# Patient Record
Sex: MALE | Race: WHITE | NOT HISPANIC OR LATINO
[De-identification: names, ages, dates, MRNs, and addresses within clinical notes are randomized per-mention and may not be internally consistent; named-entity substitution may affect disease eponyms.]

---

## 2018-09-26 ENCOUNTER — APPOINTMENT (OUTPATIENT)
Dept: ENDOCRINOLOGY | Facility: CLINIC | Age: 78
End: 2018-09-26
Payer: MEDICARE

## 2018-09-26 VITALS
HEART RATE: 72 BPM | WEIGHT: 234 LBS | HEIGHT: 75 IN | BODY MASS INDEX: 29.09 KG/M2 | SYSTOLIC BLOOD PRESSURE: 120 MMHG | DIASTOLIC BLOOD PRESSURE: 78 MMHG

## 2018-09-26 PROCEDURE — 99204 OFFICE O/P NEW MOD 45 MIN: CPT | Mod: 25

## 2018-09-26 PROCEDURE — 36415 COLL VENOUS BLD VENIPUNCTURE: CPT

## 2018-10-23 ENCOUNTER — APPOINTMENT (OUTPATIENT)
Dept: PEDIATRIC ALLERGY IMMUNOLOGY | Facility: CLINIC | Age: 78
End: 2018-10-23
Payer: MEDICARE

## 2018-10-23 VITALS
WEIGHT: 234 LBS | OXYGEN SATURATION: 98 % | DIASTOLIC BLOOD PRESSURE: 75 MMHG | BODY MASS INDEX: 29.09 KG/M2 | HEIGHT: 75 IN | SYSTOLIC BLOOD PRESSURE: 109 MMHG | TEMPERATURE: 98 F | HEART RATE: 74 BPM

## 2018-10-23 DIAGNOSIS — Z88.9 ALLERGY STATUS TO UNSPECIFIED DRUGS, MEDICAMENTS AND BIOLOGICAL SUBSTANCES: ICD-10-CM

## 2018-10-23 DIAGNOSIS — R10.9 UNSPECIFIED ABDOMINAL PAIN: ICD-10-CM

## 2018-10-23 DIAGNOSIS — R09.81 NASAL CONGESTION: ICD-10-CM

## 2018-10-23 PROCEDURE — 99204 OFFICE O/P NEW MOD 45 MIN: CPT | Mod: 25

## 2018-10-23 PROCEDURE — 95004 PERQ TESTS W/ALRGNC XTRCS: CPT

## 2018-10-25 LAB
ACTH SER-ACNC: 11 PG/ML
ALBUMIN SERPL ELPH-MCNC: 4.7 G/DL
ALP BLD-CCNC: 56 U/L
ALT SERPL-CCNC: 15 U/L
ANION GAP SERPL CALC-SCNC: 15 MMOL/L
AST SERPL-CCNC: 21 U/L
BASOPHILS # BLD AUTO: 0.02 K/UL
BASOPHILS NFR BLD AUTO: 0.3 %
BILIRUB SERPL-MCNC: 0.5 MG/DL
BUN SERPL-MCNC: 20 MG/DL
CALCIUM SERPL-MCNC: 9.6 MG/DL
CHLORIDE SERPL-SCNC: 103 MMOL/L
CHOLEST SERPL-MCNC: 173 MG/DL
CHOLEST/HDLC SERPL: 3.4 RATIO
CO2 SERPL-SCNC: 24 MMOL/L
CORTIS 24H UR-MCNC: 24 H
CORTIS 24H UR-MRATE: 21 MCG/24 H
CORTIS SERPL-MCNC: 10 UG/DL
CREAT SERPL-MCNC: 0.89 MG/DL
EOSINOPHIL # BLD AUTO: 0.33 K/UL
EOSINOPHIL NFR BLD AUTO: 5.8 %
FSH SERPL-MCNC: 18.7 IU/L
GLUCOSE SERPL-MCNC: 84 MG/DL
HBA1C MFR BLD HPLC: 5.8 %
HCT VFR BLD CALC: 43.4 %
HDLC SERPL-MCNC: 51 MG/DL
HGB BLD-MCNC: 14 G/DL
IMM GRANULOCYTES NFR BLD AUTO: 0.2 %
LDLC SERPL CALC-MCNC: 106 MG/DL
LH SERPL-ACNC: 12.6 IU/L
LYMPHOCYTES # BLD AUTO: 0.71 K/UL
LYMPHOCYTES NFR BLD AUTO: 12.4 %
MAN DIFF?: NORMAL
MCHC RBC-ENTMCNC: 31.3 PG
MCHC RBC-ENTMCNC: 32.3 GM/DL
MCV RBC AUTO: 96.9 FL
MONOCYTES # BLD AUTO: 0.67 K/UL
MONOCYTES NFR BLD AUTO: 11.7 %
NEUTROPHILS # BLD AUTO: 3.99 K/UL
NEUTROPHILS NFR BLD AUTO: 69.6 %
PLATELET # BLD AUTO: 180 K/UL
POTASSIUM SERPL-SCNC: 4.9 MMOL/L
PROT SERPL-MCNC: 7.4 G/DL
RBC # BLD: 4.48 M/UL
RBC # FLD: 14.6 %
SODIUM SERPL-SCNC: 142 MMOL/L
SPECIMEN VOL 24H UR: 1525 ML
T3 SERPL-MCNC: 101 NG/DL
T4 FREE SERPL-MCNC: 1.2 NG/DL
TESTOST BND SERPL-MCNC: 5.3 PG/ML
TESTOST SERPL-MCNC: 517.5 NG/DL
TRIGL SERPL-MCNC: 79 MG/DL
TSH SERPL-ACNC: 2.13 UIU/ML
WBC # FLD AUTO: 5.73 K/UL

## 2018-10-29 DIAGNOSIS — D80.2 SELECTIVE DEFICIENCY OF IMMUNOGLOBULIN A [IGA]: ICD-10-CM

## 2018-11-12 LAB
DEPRECATED KAPPA LC FREE/LAMBDA SER: 1.82 RATIO
GLIADIN IGA SER QL: 5.3 UNITS
GLIADIN IGG SER QL: <5 UNITS
GLIADIN PEPTIDE IGA SER-ACNC: NEGATIVE
GLIADIN PEPTIDE IGG SER-ACNC: NEGATIVE
IGA SER QL IEP: 47 MG/DL
IGA SER QL IEP: 54 MG/DL
IGG SER QL IEP: 1492 MG/DL
IGM SER QL IEP: 14 MG/DL
KAPPA LC CSF-MCNC: 0.91 MG/DL
KAPPA LC SERPL-MCNC: 1.66 MG/DL
TTG IGA SER IA-ACNC: <5 UNITS
TTG IGA SER-ACNC: NEGATIVE
TTG IGG SER IA-ACNC: >50 UNITS
TTG IGG SER IA-ACNC: POSITIVE

## 2018-11-28 ENCOUNTER — APPOINTMENT (OUTPATIENT)
Dept: GASTROENTEROLOGY | Facility: CLINIC | Age: 78
End: 2018-11-28
Payer: MEDICARE

## 2018-11-28 VITALS
BODY MASS INDEX: 28.25 KG/M2 | HEART RATE: 64 BPM | SYSTOLIC BLOOD PRESSURE: 100 MMHG | TEMPERATURE: 98.1 F | DIASTOLIC BLOOD PRESSURE: 70 MMHG | OXYGEN SATURATION: 98 % | WEIGHT: 226 LBS | RESPIRATION RATE: 14 BRPM

## 2018-11-28 PROCEDURE — 36415 COLL VENOUS BLD VENIPUNCTURE: CPT

## 2018-11-28 PROCEDURE — 99204 OFFICE O/P NEW MOD 45 MIN: CPT | Mod: 25

## 2018-12-11 LAB
ARSENIC 24H UR-MRATE: NORMAL
CADMIUM 24H UR-MRATE: NORMAL
LEAD 24H UR-MRATE: NORMAL
MERCURY 24H UR-MRATE: NORMAL

## 2018-12-17 ENCOUNTER — RESULT REVIEW (OUTPATIENT)
Age: 78
End: 2018-12-17

## 2018-12-28 ENCOUNTER — RESULT REVIEW (OUTPATIENT)
Age: 78
End: 2018-12-28

## 2018-12-28 LAB
24R-OH-CALCIDIOL SERPL-MCNC: 52.6 PG/ML
A-TOCOPHEROL VIT E SERPL-MCNC: 11.2 MG/L
ANNOTATION COMMENT IMP: NORMAL
BETA+GAMMA TOCOPHEROL SERPL-MCNC: 1 MG/L
FOLATE SERPL-MCNC: 17 NG/ML
HLA-DQ2: POSITIVE
HLA-DQ8 QL: NEGATIVE
INR PPP: 1.16 RATIO
PT BLD: 13.3 SEC
REF LAB TEST METHOD: NORMAL
VIT A SERPL-MCNC: 29.6 UG/DL
VIT B12 SERPL-MCNC: 1093 PG/ML

## 2019-01-17 ENCOUNTER — APPOINTMENT (OUTPATIENT)
Dept: GASTROENTEROLOGY | Facility: CLINIC | Age: 79
End: 2019-01-17
Payer: MEDICARE

## 2019-01-17 ENCOUNTER — LABORATORY RESULT (OUTPATIENT)
Age: 79
End: 2019-01-17

## 2019-01-17 VITALS
HEART RATE: 62 BPM | RESPIRATION RATE: 14 BRPM | BODY MASS INDEX: 28.25 KG/M2 | OXYGEN SATURATION: 99 % | WEIGHT: 226 LBS | SYSTOLIC BLOOD PRESSURE: 110 MMHG | DIASTOLIC BLOOD PRESSURE: 70 MMHG

## 2019-01-17 PROCEDURE — 99215 OFFICE O/P EST HI 40 MIN: CPT | Mod: 25

## 2019-01-17 PROCEDURE — 36415 COLL VENOUS BLD VENIPUNCTURE: CPT

## 2019-01-18 NOTE — ASSESSMENT
[FreeTextEntry1] : 78M with hx ?hypogonadism and low testosterone, chronic lyme disease on ATB, hx ?heart surgery on ELIQUIS, metoprolol, furosemide presents for evaluation of celiac disease with mild symptoms and +TTG IgG, +genetic testing and mild evidence of malabsorption\par - had prolonged conversation of risks/benefits of repeat EGD. kylie performed EGD but only took 1 biopsy where the guidelines need 6 for celiac evaluation. pt with cardiac comorbidities and need for defibrillator, therefore high risk for anesthesia. agreed that best to just proceed with GFD and f/u markers to see if improvement in TTG, vitamins\par - f/u after all tests result

## 2019-01-18 NOTE — PHYSICAL EXAM
[General Appearance - Alert] : alert [General Appearance - In No Acute Distress] : in no acute distress [Sclera] : the sclera and conjunctiva were normal [Outer Ear] : the ears and nose were normal in appearance [Neck Appearance] : the appearance of the neck was normal [Heart Rate And Rhythm] : heart rate was normal and rhythm regular [Edema] : there was no peripheral edema [Bowel Sounds] : normal bowel sounds [Abdomen Soft] : soft [Abdomen Tenderness] : non-tender [Abdomen Mass (___ Cm)] : no abdominal mass palpated [No CVA Tenderness] : no ~M costovertebral angle tenderness [Abnormal Walk] : normal gait [] : no rash [No Focal Deficits] : no focal deficits [Oriented To Time, Place, And Person] : oriented to person, place, and time

## 2019-01-18 NOTE — HISTORY OF PRESENT ILLNESS
[de-identified] : 78M with hx ?hypogonadism and low testosterone, lyme disase, hx ?heart surgery on ELIQUIS, metoprolol, furosemide presents for evaluation of celiac disease. \par 1/17/19\par - pt feeling well, has been seeing cardiologist who says he has weak heart wants to implant defibrillator\par - trying to eat gluten free with wife but not perfect\par - vitamin A was low but not low enough to be "deficient" PT/INR minimally elevated\par - celiac genetic testing was + for possible celiac\par PREVIOUS HX\par Pt initially presetned to Dr. Andrade for possibvle food allergy/drug allergy evaluation and states he felt fatigued and lightheaded after eating bread, bloating. Hx of autoimmune diseases in family. Does feel better after doing gluten free food trial. Also c/o tingling in his fingers. Pt reports having egd/cscope done with outside GI Dr. Manjarrez ~1yr ago report not available. \par \par

## 2019-01-22 DIAGNOSIS — T56.894A: ICD-10-CM

## 2019-01-29 ENCOUNTER — RESULT REVIEW (OUTPATIENT)
Age: 79
End: 2019-01-29

## 2019-01-29 LAB
CRP SERPL-MCNC: 0.19 MG/DL
DEPRECATED WHEAT IGE RAST QL: 0
GLIADIN IGA SER QL: <5 UNITS
GLIADIN IGG SER QL: <5 UNITS
GLIADIN PEPTIDE IGA SER-ACNC: NEGATIVE
GLIADIN PEPTIDE IGG SER-ACNC: NEGATIVE
TRYPTASE: 3.8 NG/ML
TTG IGA SER IA-ACNC: <5 UNITS
TTG IGA SER-ACNC: NEGATIVE
TTG IGG SER IA-ACNC: >50 UNITS
TTG IGG SER IA-ACNC: POSITIVE
VIT A SERPL-MCNC: 33.4 UG/DL
WHEAT IGE QN: <0.1 KUA/L

## 2019-02-12 ENCOUNTER — RESULT REVIEW (OUTPATIENT)
Age: 79
End: 2019-02-12

## 2019-02-27 ENCOUNTER — NON-APPOINTMENT (OUTPATIENT)
Age: 79
End: 2019-02-27

## 2019-02-27 ENCOUNTER — APPOINTMENT (OUTPATIENT)
Dept: HEART AND VASCULAR | Facility: CLINIC | Age: 79
End: 2019-02-27
Payer: MEDICARE

## 2019-02-27 VITALS
HEART RATE: 43 BPM | BODY MASS INDEX: 28.1 KG/M2 | WEIGHT: 226 LBS | HEIGHT: 75 IN | DIASTOLIC BLOOD PRESSURE: 59 MMHG | SYSTOLIC BLOOD PRESSURE: 114 MMHG

## 2019-02-27 DIAGNOSIS — L65.9 NONSCARRING HAIR LOSS, UNSPECIFIED: ICD-10-CM

## 2019-02-27 DIAGNOSIS — Z87.19 PERSONAL HISTORY OF OTHER DISEASES OF THE DIGESTIVE SYSTEM: ICD-10-CM

## 2019-02-27 PROCEDURE — 93000 ELECTROCARDIOGRAM COMPLETE: CPT

## 2019-02-27 PROCEDURE — 99205 OFFICE O/P NEW HI 60 MIN: CPT

## 2019-02-27 RX ORDER — FUROSEMIDE 80 MG/1
TABLET ORAL
Refills: 0 | Status: DISCONTINUED | COMMUNITY
End: 2019-02-27

## 2019-02-27 RX ORDER — DOXYCYCLINE HYCLATE 100 MG/1
100 CAPSULE ORAL
Qty: 42 | Refills: 0 | Status: DISCONTINUED | COMMUNITY
Start: 2018-11-13 | End: 2019-02-27

## 2019-02-27 RX ORDER — METOPROLOL TARTRATE 75 MG/1
TABLET, FILM COATED ORAL
Refills: 0 | Status: DISCONTINUED | COMMUNITY
End: 2019-02-27

## 2019-02-27 NOTE — REVIEW OF SYSTEMS
[Fever] : no fever [Headache] : no headache [Recent Weight Gain (___ Lbs)] : no recent weight gain [Chills] : no chills [Feeling Fatigued] : feeling fatigued [Recent Weight Loss (___ Lbs)] : no recent weight loss [Shortness Of Breath] : shortness of breath [Dyspnea on exertion] : dyspnea during exertion [Chest  Pressure] : no chest pressure [Chest Pain] : no chest pain [Lower Ext Edema] : no extremity edema [Leg Claudication] : no intermittent leg claudication [Palpitations] : no palpitations [Joint Pain] : joint pain [Joint Swelling] : no joint swelling [Joint Stiffness] : no joint stiffness [Muscle Cramps] : no muscle cramps [Limb Weakness (Paresis)] : no limb weakness [Negative] : Heme/Lymph

## 2019-02-27 NOTE — HISTORY OF PRESENT ILLNESS
[FreeTextEntry1] : 78 y.o. male patient with a h/o mitral and aortic valve surgery ~ 4 years ago with Dr. Davis at [facility].  He has been told that he may need a "device" - possibly CRT.  He also has a h/o Atrial fibrillation that was noted post operatively.  He is "not quite right" - he has poor stamina and gets easily fatigued.  He has been undergoing treatment for chronic fatigue syndrom / lyme disease for this.  He denies any hospitalizations for HF since his surgery.  He believes that his ejection fraction is < 35%.  He has never had syncope, near syncope, palpitation, chest pain, orthopnea, lower extremity edema.\par \par He is here for a second opinion regarding need for device insertion.  He also is interested in whether his stamina can be improved.\par \par There is no cardiology data from his prior evaluation available for review today.

## 2019-02-27 NOTE — PHYSICAL EXAM
[General Appearance - Well Developed] : well developed [Normal Appearance] : normal appearance [Well Groomed] : well groomed [General Appearance - Well Nourished] : well nourished [No Deformities] : no deformities [General Appearance - In No Acute Distress] : no acute distress [Normal Conjunctiva] : the conjunctiva exhibited no abnormalities [Eyelids - No Xanthelasma] : the eyelids demonstrated no xanthelasmas [Normal Oral Mucosa] : normal oral mucosa [No Oral Pallor] : no oral pallor [No Oral Cyanosis] : no oral cyanosis [Normal Jugular Venous A Waves Present] : normal jugular venous A waves present [Normal Jugular Venous V Waves Present] : normal jugular venous V waves present [No Jugular Venous Castillo A Waves] : no jugular venous castillo A waves [Heart Rate And Rhythm] : heart rate and rhythm were normal [Heart Sounds] : normal S1 and S2 [Murmurs] : no murmurs present [Normal] : the heart rate was normal [Irregularly Irregular] : the rhythm was irregularly irregular [FreeTextEntry1] : Well healed sternotomy [Respiration, Rhythm And Depth] : normal respiratory rhythm and effort [Exaggerated Use Of Accessory Muscles For Inspiration] : no accessory muscle use [Auscultation Breath Sounds / Voice Sounds] : lungs were clear to auscultation bilaterally [Abdomen Soft] : soft [Abdomen Tenderness] : non-tender [Abdomen Mass (___ Cm)] : no abdominal mass palpated [Abnormal Walk] : normal gait [Gait - Sufficient For Exercise Testing] : the gait was sufficient for exercise testing [Nail Clubbing] : no clubbing of the fingernails [Cyanosis, Localized] : no localized cyanosis [Petechial Hemorrhages (___cm)] : no petechial hemorrhages [Skin Color & Pigmentation] : normal skin color and pigmentation [] : no rash [No Venous Stasis] : no venous stasis [Skin Lesions] : no skin lesions [No Skin Ulcers] : no skin ulcer [No Xanthoma] : no  xanthoma was observed [Oriented To Time, Place, And Person] : oriented to person, place, and time [Affect] : the affect was normal [Mood] : the mood was normal [No Anxiety] : not feeling anxious

## 2019-03-05 ENCOUNTER — APPOINTMENT (OUTPATIENT)
Dept: HEART AND VASCULAR | Facility: CLINIC | Age: 79
End: 2019-03-05

## 2019-04-03 ENCOUNTER — APPOINTMENT (OUTPATIENT)
Dept: ENDOCRINOLOGY | Facility: CLINIC | Age: 79
End: 2019-04-03
Payer: MEDICARE

## 2019-04-03 VITALS
WEIGHT: 228 LBS | HEIGHT: 75 IN | HEART RATE: 71 BPM | DIASTOLIC BLOOD PRESSURE: 70 MMHG | BODY MASS INDEX: 28.35 KG/M2 | SYSTOLIC BLOOD PRESSURE: 110 MMHG

## 2019-04-03 DIAGNOSIS — R79.89 OTHER SPECIFIED ABNORMAL FINDINGS OF BLOOD CHEMISTRY: ICD-10-CM

## 2019-04-03 DIAGNOSIS — R53.83 OTHER FATIGUE: ICD-10-CM

## 2019-04-03 DIAGNOSIS — R63.5 ABNORMAL WEIGHT GAIN: ICD-10-CM

## 2019-04-03 LAB
GLUCOSE BLDC GLUCOMTR-MCNC: 123
HBA1C MFR BLD HPLC: 6

## 2019-04-03 PROCEDURE — 82962 GLUCOSE BLOOD TEST: CPT

## 2019-04-03 PROCEDURE — 83036 HEMOGLOBIN GLYCOSYLATED A1C: CPT | Mod: QW

## 2019-04-03 PROCEDURE — 99214 OFFICE O/P EST MOD 30 MIN: CPT | Mod: 25

## 2019-04-03 NOTE — REVIEW OF SYSTEMS
[As Noted in HPI] : as noted in HPI [Recent Weight Loss (___ Lbs)] : recent [unfilled] ~Ulb weight loss [Negative] : Heme/Lymph

## 2019-04-10 ENCOUNTER — APPOINTMENT (OUTPATIENT)
Dept: GASTROENTEROLOGY | Facility: CLINIC | Age: 79
End: 2019-04-10

## 2019-04-11 LAB
ALBUMIN SERPL ELPH-MCNC: 4.3 G/DL
ALP BLD-CCNC: 59 U/L
ALT SERPL-CCNC: 15 U/L
ANION GAP SERPL CALC-SCNC: 10 MMOL/L
AST SERPL-CCNC: 14 U/L
BASOPHILS # BLD AUTO: 0.05 K/UL
BASOPHILS NFR BLD AUTO: 1 %
BILIRUB SERPL-MCNC: 0.4 MG/DL
BUN SERPL-MCNC: 22 MG/DL
CALCIUM SERPL-MCNC: 9.3 MG/DL
CHLORIDE SERPL-SCNC: 110 MMOL/L
CHOLEST SERPL-MCNC: 129 MG/DL
CHOLEST/HDLC SERPL: 3.2 RATIO
CO2 SERPL-SCNC: 25 MMOL/L
CREAT SERPL-MCNC: 0.93 MG/DL
EOSINOPHIL # BLD AUTO: 0.31 K/UL
EOSINOPHIL NFR BLD AUTO: 6 %
ESTIMATED AVERAGE GLUCOSE: 123 MG/DL
FSH SERPL-MCNC: 18.5 IU/L
GLUCOSE SERPL-MCNC: 135 MG/DL
HBA1C MFR BLD HPLC: 5.9 %
HCT VFR BLD CALC: 43.1 %
HDLC SERPL-MCNC: 41 MG/DL
HGB BLD-MCNC: 14 G/DL
IMM GRANULOCYTES NFR BLD AUTO: 0.2 %
LDLC SERPL CALC-MCNC: 71 MG/DL
LH SERPL-ACNC: 15.5 IU/L
LYMPHOCYTES # BLD AUTO: 0.76 K/UL
LYMPHOCYTES NFR BLD AUTO: 14.8 %
MAN DIFF?: NORMAL
MCHC RBC-ENTMCNC: 31.3 PG
MCHC RBC-ENTMCNC: 32.5 GM/DL
MCV RBC AUTO: 96.4 FL
MONOCYTES # BLD AUTO: 0.7 K/UL
MONOCYTES NFR BLD AUTO: 13.6 %
NEUTROPHILS # BLD AUTO: 3.32 K/UL
NEUTROPHILS NFR BLD AUTO: 64.4 %
PLATELET # BLD AUTO: 153 K/UL
POTASSIUM SERPL-SCNC: 4.6 MMOL/L
PROLACTIN SERPL-MCNC: 14.2 NG/ML
PROT SERPL-MCNC: 7.1 G/DL
RBC # BLD: 4.47 M/UL
RBC # FLD: 14 %
SODIUM SERPL-SCNC: 145 MMOL/L
T3 SERPL-MCNC: 81 NG/DL
T4 FREE SERPL-MCNC: 1.1 NG/DL
TESTOST BND SERPL-MCNC: 4.9 PG/ML
TESTOST SERPL-MCNC: 338.2 NG/DL
TRIGL SERPL-MCNC: 84 MG/DL
TSH SERPL-ACNC: 2.12 UIU/ML
WBC # FLD AUTO: 5.15 K/UL

## 2019-04-11 NOTE — ASSESSMENT
[FreeTextEntry1] : The patient is doing somewhat better - he has lost weight.\par Would continue current tx.\par Lab. tests today.\par To see urologist.\par Consider testosterone supplementation.\par F/u - 3 months.

## 2019-04-11 NOTE — HISTORY OF PRESENT ILLNESS
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 Recalls receiving testosterone injections about 20 yrs ago for "low testosterone level".\par He also c/o weight gain of about 20 lb in the last 4-5 yrs.\par 4/3/19. The patient is doing somewhat better. He has lost 6 lb.\par Is interested in T replacement at this time.\par HbA1C today is 6%, glucose - 123 mg/dl.

## 2019-04-16 ENCOUNTER — APPOINTMENT (OUTPATIENT)
Dept: HEART AND VASCULAR | Facility: CLINIC | Age: 79
End: 2019-04-16
Payer: MEDICARE

## 2019-04-16 ENCOUNTER — NON-APPOINTMENT (OUTPATIENT)
Age: 79
End: 2019-04-16

## 2019-04-16 VITALS
BODY MASS INDEX: 28.35 KG/M2 | WEIGHT: 228 LBS | DIASTOLIC BLOOD PRESSURE: 68 MMHG | SYSTOLIC BLOOD PRESSURE: 102 MMHG | HEART RATE: 75 BPM | HEIGHT: 75 IN

## 2019-04-16 DIAGNOSIS — Z95.2 PRESENCE OF PROSTHETIC HEART VALVE: ICD-10-CM

## 2019-04-16 DIAGNOSIS — R53.82 CHRONIC FATIGUE, UNSPECIFIED: ICD-10-CM

## 2019-04-16 DIAGNOSIS — Z86.19 PERSONAL HISTORY OF OTHER INFECTIOUS AND PARASITIC DISEASES: ICD-10-CM

## 2019-04-16 PROCEDURE — 93306 TTE W/DOPPLER COMPLETE: CPT

## 2019-04-16 PROCEDURE — 93000 ELECTROCARDIOGRAM COMPLETE: CPT

## 2019-04-16 PROCEDURE — 99205 OFFICE O/P NEW HI 60 MIN: CPT | Mod: 25

## 2019-04-16 PROCEDURE — 36415 COLL VENOUS BLD VENIPUNCTURE: CPT

## 2019-04-16 RX ORDER — FINASTERIDE 1 MG/1
1 TABLET ORAL DAILY
Refills: 0 | Status: DISCONTINUED | COMMUNITY
Start: 2019-02-27 | End: 2019-04-16

## 2019-04-17 LAB — NT-PROBNP SERPL-MCNC: 2135 PG/ML

## 2019-05-02 ENCOUNTER — FORM ENCOUNTER (OUTPATIENT)
Age: 79
End: 2019-05-02

## 2019-05-03 ENCOUNTER — APPOINTMENT (OUTPATIENT)
Dept: MRI IMAGING | Facility: HOSPITAL | Age: 79
End: 2019-05-03
Payer: MEDICARE

## 2019-05-03 ENCOUNTER — OUTPATIENT (OUTPATIENT)
Dept: OUTPATIENT SERVICES | Facility: HOSPITAL | Age: 79
LOS: 1 days | End: 2019-05-03
Payer: MEDICARE

## 2019-05-03 PROCEDURE — 71555 MRI ANGIO CHEST W OR W/O DYE: CPT

## 2019-05-03 PROCEDURE — 71555 MRI ANGIO CHEST W OR W/O DYE: CPT | Mod: 26

## 2019-05-09 ENCOUNTER — RX RENEWAL (OUTPATIENT)
Age: 79
End: 2019-05-09

## 2019-05-09 ENCOUNTER — APPOINTMENT (OUTPATIENT)
Dept: HEART AND VASCULAR | Facility: CLINIC | Age: 79
End: 2019-05-09
Payer: MEDICARE

## 2019-05-09 ENCOUNTER — NON-APPOINTMENT (OUTPATIENT)
Age: 79
End: 2019-05-09

## 2019-05-09 VITALS
WEIGHT: 233 LBS | BODY MASS INDEX: 28.97 KG/M2 | HEIGHT: 75 IN | HEART RATE: 77 BPM | SYSTOLIC BLOOD PRESSURE: 90 MMHG | DIASTOLIC BLOOD PRESSURE: 60 MMHG

## 2019-05-09 VITALS — DIASTOLIC BLOOD PRESSURE: 60 MMHG | SYSTOLIC BLOOD PRESSURE: 94 MMHG

## 2019-05-09 DIAGNOSIS — I71.2 THORACIC AORTIC ANEURYSM, W/OUT RUPTURE: ICD-10-CM

## 2019-05-09 PROCEDURE — 93000 ELECTROCARDIOGRAM COMPLETE: CPT

## 2019-05-09 PROCEDURE — 99214 OFFICE O/P EST MOD 30 MIN: CPT | Mod: 25

## 2019-05-09 NOTE — PHYSICAL EXAM
[General Appearance - Well Developed] : well developed [Normal Appearance] : normal appearance [Well Groomed] : well groomed [General Appearance - Well Nourished] : well nourished [General Appearance - In No Acute Distress] : no acute distress [No Deformities] : no deformities [Normal Conjunctiva] : the conjunctiva exhibited no abnormalities [Normal Oral Mucosa] : normal oral mucosa [] : no respiratory distress [Respiration, Rhythm And Depth] : normal respiratory rhythm and effort [Exaggerated Use Of Accessory Muscles For Inspiration] : no accessory muscle use [Auscultation Breath Sounds / Voice Sounds] : lungs were clear to auscultation bilaterally [Heart Rate And Rhythm] : heart rate and rhythm were normal [Heart Sounds] : normal S1 and S2 [Murmurs] : no murmurs present [Bowel Sounds] : normal bowel sounds [Edema] : no peripheral edema present [Abdomen Soft] : soft [Abdomen Tenderness] : non-tender [Nail Clubbing] : no clubbing of the fingernails [Abnormal Walk] : normal gait [Skin Color & Pigmentation] : normal skin color and pigmentation [Oriented To Time, Place, And Person] : oriented to person, place, and time [FreeTextEntry1] : mild + JVD

## 2019-05-09 NOTE — HISTORY OF PRESENT ILLNESS
[FreeTextEntry1] : tolerating Entresto- has occasional unsteadiness but not worse since increase in Entresto- periodic sob

## 2019-05-09 NOTE — DISCUSSION/SUMMARY
[FreeTextEntry1] : EKG:NSR,APC,VPC\par reviewed  MRA results and i will have them see Dr Jose for aortic aneurysm\par cont Entresto + Toprol for CM, discussed need for ICD and they will see EP\par cont eliquis for pAF\par will not increase BB due to Hypotension

## 2019-05-15 ENCOUNTER — APPOINTMENT (OUTPATIENT)
Dept: CARDIOTHORACIC SURGERY | Facility: CLINIC | Age: 79
End: 2019-05-15
Payer: MEDICARE

## 2019-05-15 PROCEDURE — 99205 OFFICE O/P NEW HI 60 MIN: CPT

## 2019-05-16 VITALS
SYSTOLIC BLOOD PRESSURE: 119 MMHG | HEART RATE: 74 BPM | DIASTOLIC BLOOD PRESSURE: 68 MMHG | RESPIRATION RATE: 16 BRPM | BODY MASS INDEX: 27.98 KG/M2 | HEIGHT: 75 IN | OXYGEN SATURATION: 96 % | WEIGHT: 225 LBS | TEMPERATURE: 98.1 F

## 2019-05-16 RX ORDER — MINOXIDIL 5 %
5 SOLUTION, NON-ORAL TOPICAL
Refills: 0 | Status: COMPLETED | COMMUNITY
End: 2019-05-16

## 2019-05-16 NOTE — PHYSICAL EXAM
[General Appearance - Alert] : alert [General Appearance - In No Acute Distress] : in no acute distress [Outer Ear] : the ears and nose were normal in appearance [Sclera] : the sclera and conjunctiva were normal [] : no respiratory distress [Apical Impulse] : the apical impulse was normal [Examination Of The Chest] : the chest was normal in appearance [2+] : left 2+ [Abdomen Soft] : soft [No CVA Tenderness] : no ~M costovertebral angle tenderness [Abnormal Walk] : normal gait [Deep Tendon Reflexes (DTR)] : deep tendon reflexes were 2+ and symmetric [Oriented To Time, Place, And Person] : oriented to person, place, and time

## 2019-05-17 ENCOUNTER — APPOINTMENT (OUTPATIENT)
Dept: HEART AND VASCULAR | Facility: CLINIC | Age: 79
End: 2019-05-17
Payer: MEDICARE

## 2019-05-17 ENCOUNTER — NON-APPOINTMENT (OUTPATIENT)
Age: 79
End: 2019-05-17

## 2019-05-17 VITALS — SYSTOLIC BLOOD PRESSURE: 105 MMHG | DIASTOLIC BLOOD PRESSURE: 73 MMHG | HEART RATE: 72 BPM

## 2019-05-17 PROCEDURE — 99214 OFFICE O/P EST MOD 30 MIN: CPT | Mod: 25

## 2019-05-17 PROCEDURE — 93000 ELECTROCARDIOGRAM COMPLETE: CPT

## 2019-06-04 NOTE — HISTORY OF PRESENT ILLNESS
[FreeTextEntry1] : 78 year old male with history of Lyme Disease (being followed by Dr. López, treated with antibiotics, off for a year now), MVR/AVR with Dr. Davis at [facility] [place] [facility] 12/2014, cardiomyopathy with reduced EF (20-25%), A fib (on Eliquis, being followed by Dr. Ortiz for ICD/CRT), ROHAN on CPAP, fatigue, presents today for an initial evaluation of an aortic aneurysm. He was referred by Dr. Bowen Zuñiga. \par \par Echo 4/16/19: Aortic root 4.1cm, ascending aorta 4.8cm, bioprosthetic mitral valve replacement with minimal MR, bioprosthetic aortic valve replacement with no AR, EF 20-25%, severe reversible diastolic dysfunction (stage III).\par \par MRA chest 5/3/19: aortic root 4.4cm, ascending aorta 5cm.  \par \par Patient endorses NYHA class II symptoms. His chief complaint is  mild shortness of breath with activity.  He states he finds it difficult to climb uphill due to his breathing. He has to stop after walking 1 city block to catch his breath. He also states it is difficult to catch a deep breath in. Patient denies any fever, chills, fatigue, syncope, acute chest pain, palpitations, orthopnea, paroxysmal nocturnal dyspnea, vomiting, abdominal pain, back pain, BRBPR or swelling to legs.\par \par Patient is an actor. He remains an active lifestyle by going to the gym every other day (bicycle or treadmill).\par \par

## 2019-06-04 NOTE — CONSULT LETTER
[Dear  ___] : Dear  [unfilled], [Sincerely,] : Sincerely, [Please see my note below.] : Please see my note below. [FreeTextEntry2] : Dr. Bowen Zuñiga\par [address]\[place] [address] [FreeTextEntry1] : I had the pleasure of seeing your patient, DALTON FLOWER, in my office today. We take a multidisciplinary team approach to patient care and consider you, the referring physician, an extension of our team. We will maintain an open line of communication with you throughout your patient's treatment course.  \par \par He is being evaluated for an aortic aneurysm. I have reviewed all of the medical records and diagnostic images at the time of his office visit. I have enclosed a copy for your records. \par \par I have reviewed the indications for surgery,and used our webpage [email or web address] <[email or web address]> to illustrate the aorta and anatomy of the heart. The patient does not meet size criteria for surgical intervention at the time. Therefore, I have recommended that the patient will require a follow up appointment in 1 year with a CTA chest to monitor aortic pathology. My office will assist the patient with his upcoming appointment and I will update you on his progress at that time.\par \par I have discussed with the patient that we will continue to monitor his aortic pathology closely at the [facility] at [facility] that encompasses the entire health care system and is one of the largest in the nation at this point.\par \par It is our commitment to provide your patient with the highest quality of advanced therapeutic options. On behalf of the Cardiothoracic Surgery Team, thank you for sending your patient to the [facility] based at [facility].  If there are any questions or concerns, please call me directly at [phone].  \par \par \par  [FreeTextEntry3] : \par Delon Jose M.D.\par Professor of Cardiovascular and Thoracic Surgery\par Minimally Invasive Valve Surgeon\par Director of Aortic Surgery, [facility]\par Cell: [phone]\par Email: [email or web address] \par \par [facility]:\par [address] Floor, [place] [address]\par Office: [phone]\par Fax: [phone]\par \par [facility]:\par Department of Cardiovascular and Thoracic Surgery\par [address] [place] [address], [address]\par Office: [phone]\par Fax: [phone]\par \par Practice Manager: Ms. Janelle Shahid\par Email: [email or web address]\par Phone: [phone]\par \par \par

## 2019-06-04 NOTE — ASSESSMENT
[FreeTextEntry1] : 78 year old male with history of Lyme Disease (being followed by Dr. López), MVR/AVR with Dr. Davis at [facility], cardiomyopathy with reduced EF (20-25%), A fib (on Eliquis, being followed by Dr. Ortiz for ICD/CRT), ROHAN on CPAP, fatigue, presents today for an initial evaluation of an aortic aneurysm.\par \par The patient's medical records and diagnostic images were reviewed at the time of this office visit, and the following recommendation was made. Patient was deemed high risk for surgical intervention due to his age and prior medical/surgical history. It was decided to closely monitor his aortic pathology for growth. Previous imaging will also be procured to analyze rate of aortic dimension growth. Patient verbalized understanding and agrees to plan discussed. \par Plan:\par 1. Continue medication regimen\par 2. Follow up with PCP and cardiologist\par 3. BP control- I have recommended the patient to monitor his blood pressure closely. I have also advised the patient to take daily blood pressures at home and adhere to medication regimen.\par 4. Return to the [facility] in 1 year with a an MRA chest \par \par

## 2019-06-04 NOTE — PROCEDURE
[FreeTextEntry1] : \par Dr. Jose discussed activity restrictions with the patient, and would advise exercise at a moderate amount with no heavy lifting over one third of body weight, and avoiding heart rates that exceed 140 beats per minute. Every patient must abstain from tobacco and illicit drug use, especially stimulants such as cocaine or methamphetamine. The patient was also counseled on maintaining a healthy heart diet, and losing any excessive weight as this also put undue stress on both the aorta and entire cardiovascular system. He was counseled on the importance of medication adherence for blood pressure control, as hypertension is a significant risk factor associated with aortic aneurysms. First degree family members should be screened for bicuspid valve disease, and ascending aortic aneurysms.\par \par Dr. Jose reviewed the indications for surgery, and used our webpage [email or web address] to illustrate the aorta and anatomy of the heart. Those indications are the following: size greater than 5.0 cm, symptomatic aneurysms, family history of aortic dissection or aneurysm death with a size greater than 4.5 cm, other necessary cardiac procedures such as coronary artery bypass grafting or severe valvular disorders with an aneurysm greater than 4.5 cm, or connective tissue disorders with an aneurysm size greater than 4.5 cm. The patient does not meet size criteria for surgical intervention at the time.\par

## 2019-06-24 ENCOUNTER — NON-APPOINTMENT (OUTPATIENT)
Age: 79
End: 2019-06-24

## 2019-06-24 ENCOUNTER — APPOINTMENT (OUTPATIENT)
Dept: HEART AND VASCULAR | Facility: CLINIC | Age: 79
End: 2019-06-24
Payer: MEDICARE

## 2019-06-24 VITALS
SYSTOLIC BLOOD PRESSURE: 110 MMHG | WEIGHT: 221 LBS | BODY MASS INDEX: 27.48 KG/M2 | DIASTOLIC BLOOD PRESSURE: 64 MMHG | HEIGHT: 75 IN | HEART RATE: 72 BPM

## 2019-06-24 PROCEDURE — 93000 ELECTROCARDIOGRAM COMPLETE: CPT

## 2019-06-24 PROCEDURE — 99214 OFFICE O/P EST MOD 30 MIN: CPT | Mod: 25

## 2019-06-24 RX ORDER — TESTOSTERONE 16.2 MG/G
20.25 MG/ACT GEL TRANSDERMAL
Qty: 2 | Refills: 0 | Status: DISCONTINUED | COMMUNITY
Start: 2019-05-09 | End: 2019-06-24

## 2019-06-24 NOTE — PHYSICAL EXAM
[Normal Appearance] : normal appearance [General Appearance - Well Developed] : well developed [General Appearance - Well Nourished] : well nourished [Well Groomed] : well groomed [No Deformities] : no deformities [Normal Conjunctiva] : the conjunctiva exhibited no abnormalities [General Appearance - In No Acute Distress] : no acute distress [Normal Oral Mucosa] : normal oral mucosa [Respiration, Rhythm And Depth] : normal respiratory rhythm and effort [FreeTextEntry1] : mild + JVD [] : no respiratory distress [Auscultation Breath Sounds / Voice Sounds] : lungs were clear to auscultation bilaterally [Exaggerated Use Of Accessory Muscles For Inspiration] : no accessory muscle use [Heart Rate And Rhythm] : heart rate and rhythm were normal [Murmurs] : no murmurs present [Heart Sounds] : normal S1 and S2 [Abdomen Soft] : soft [Edema] : no peripheral edema present [Bowel Sounds] : normal bowel sounds [Abnormal Walk] : normal gait [Abdomen Tenderness] : non-tender [Skin Color & Pigmentation] : normal skin color and pigmentation [Oriented To Time, Place, And Person] : oriented to person, place, and time [Nail Clubbing] : no clubbing of the fingernails

## 2019-06-24 NOTE — REVIEW OF SYSTEMS
[Recent Weight Gain (___ Lbs)] : no recent weight gain [Fever] : no fever [Headache] : no headache [Chills] : no chills [Feeling Fatigued] : feeling fatigued [see HPI] : see HPI [Loss Of Hearing] : hearing loss [Recent Weight Loss (___ Lbs)] : recent [unfilled] ~Ulb weight loss [Cough] : cough [Wheezing] : wheezing [Coughing Up Blood] : no hemoptysis [Nocturia] : nocturia [Hematuria] : no hematuria [Joint Pain] : joint pain [Knee Problem] : knee problems [Tremor] : no tremor was seen [Dizziness] : dizziness [Tingling (Paresthesia)] : no tingling [Convulsions] : no convulsions [Numbness (Hypesthesia)] : no numbness [Depression] : no depression [Confusion] : no confusion was observed [Memory Lapses Or Loss] : memory lapses or loss [Anxiety] : no anxiety [Under Stress] : under stress [Easy Bruising] : a tendency for easy bruising [Easy Bleeding] : no tendency for easy bleeding [Suicidal] : not suicidal [Negative] : Endocrine

## 2019-06-24 NOTE — DISCUSSION/SUMMARY
[FreeTextEntry1] : EKG:NSR,PVC\par feel CHF would add Lasix, daily weights\par RV 1 week \par cont toprol( will increase next week)\par and entresto for CHF\par cont Eliquis for PAF\par f/u with dr rosales for aorta

## 2019-06-24 NOTE — HISTORY OF PRESENT ILLNESS
[FreeTextEntry1] : has postponed ICD placement due to " bronchitis" has it past month with coughing- saw pulmonologist who DX it. Using CPAP, no edema, cp

## 2019-07-01 ENCOUNTER — APPOINTMENT (OUTPATIENT)
Dept: HEART AND VASCULAR | Facility: CLINIC | Age: 79
End: 2019-07-01
Payer: MEDICARE

## 2019-07-01 VITALS — WEIGHT: 219 LBS | OXYGEN SATURATION: 99 % | BODY MASS INDEX: 27.37 KG/M2 | HEART RATE: 76 BPM

## 2019-07-01 VITALS — SYSTOLIC BLOOD PRESSURE: 110 MMHG | DIASTOLIC BLOOD PRESSURE: 70 MMHG

## 2019-07-01 PROCEDURE — 99213 OFFICE O/P EST LOW 20 MIN: CPT | Mod: 25

## 2019-07-01 PROCEDURE — 36415 COLL VENOUS BLD VENIPUNCTURE: CPT

## 2019-07-01 RX ORDER — TRIAMCINOLONE ACETONIDE 1 MG/G
0.1 CREAM TOPICAL
Qty: 454 | Refills: 0 | Status: DISCONTINUED | COMMUNITY
Start: 2018-03-08

## 2019-07-01 RX ORDER — OMEPRAZOLE 40 MG/1
40 CAPSULE, DELAYED RELEASE ORAL
Qty: 30 | Refills: 0 | Status: DISCONTINUED | COMMUNITY
Start: 2019-02-01

## 2019-07-01 NOTE — DISCUSSION/SUMMARY
[FreeTextEntry1] : check labs\par will increase toprol and cont lasix + entresto for CHF/ICM;Eliquis for af\par needs ICD for CM

## 2019-07-01 NOTE — PHYSICAL EXAM
[General Appearance - Well Developed] : well developed [Normal Appearance] : normal appearance [Well Groomed] : well groomed [General Appearance - Well Nourished] : well nourished [No Deformities] : no deformities [General Appearance - In No Acute Distress] : no acute distress [Normal Conjunctiva] : the conjunctiva exhibited no abnormalities [FreeTextEntry1] : mild + JVD [] : no respiratory distress [Respiration, Rhythm And Depth] : normal respiratory rhythm and effort [Exaggerated Use Of Accessory Muscles For Inspiration] : no accessory muscle use [Auscultation Breath Sounds / Voice Sounds] : lungs were clear to auscultation bilaterally [Heart Rate And Rhythm] : heart rate and rhythm were normal [Heart Sounds] : normal S1 and S2 [Murmurs] : no murmurs present [Edema] : no peripheral edema present [Abdomen Soft] : soft [Abdomen Tenderness] : non-tender [Abnormal Walk] : normal gait [Nail Clubbing] : no clubbing of the fingernails [Oriented To Time, Place, And Person] : oriented to person, place, and time

## 2019-07-02 ENCOUNTER — CLINICAL ADVICE (OUTPATIENT)
Age: 79
End: 2019-07-02

## 2019-07-02 LAB
ANION GAP SERPL CALC-SCNC: 13 MMOL/L
BUN SERPL-MCNC: 26 MG/DL
CALCIUM SERPL-MCNC: 9.6 MG/DL
CHLORIDE SERPL-SCNC: 104 MMOL/L
CO2 SERPL-SCNC: 26 MMOL/L
CREAT SERPL-MCNC: 1.07 MG/DL
GLUCOSE SERPL-MCNC: 95 MG/DL
NT-PROBNP SERPL-MCNC: 3474 PG/ML
POTASSIUM SERPL-SCNC: 4.7 MMOL/L
SODIUM SERPL-SCNC: 143 MMOL/L

## 2019-07-08 ENCOUNTER — NON-APPOINTMENT (OUTPATIENT)
Age: 79
End: 2019-07-08

## 2019-07-08 ENCOUNTER — APPOINTMENT (OUTPATIENT)
Dept: HEART AND VASCULAR | Facility: CLINIC | Age: 79
End: 2019-07-08
Payer: MEDICARE

## 2019-07-08 VITALS
BODY MASS INDEX: 27.23 KG/M2 | SYSTOLIC BLOOD PRESSURE: 118 MMHG | DIASTOLIC BLOOD PRESSURE: 76 MMHG | HEIGHT: 75 IN | WEIGHT: 219 LBS | HEART RATE: 61 BPM | OXYGEN SATURATION: 97 %

## 2019-07-08 PROCEDURE — 36415 COLL VENOUS BLD VENIPUNCTURE: CPT

## 2019-07-08 PROCEDURE — 99213 OFFICE O/P EST LOW 20 MIN: CPT | Mod: 25

## 2019-07-08 PROCEDURE — 93000 ELECTROCARDIOGRAM COMPLETE: CPT

## 2019-07-08 NOTE — PHYSICAL EXAM
[General Appearance - Well Developed] : well developed [Normal Appearance] : normal appearance [Well Groomed] : well groomed [General Appearance - In No Acute Distress] : no acute distress [No Deformities] : no deformities [General Appearance - Well Nourished] : well nourished [Normal Conjunctiva] : the conjunctiva exhibited no abnormalities [Respiration, Rhythm And Depth] : normal respiratory rhythm and effort [Exaggerated Use Of Accessory Muscles For Inspiration] : no accessory muscle use [Auscultation Breath Sounds / Voice Sounds] : lungs were clear to auscultation bilaterally [] : no respiratory distress [Heart Sounds] : normal S1 and S2 [Heart Rate And Rhythm] : heart rate and rhythm were normal [Edema] : no peripheral edema present [Abdomen Soft] : soft [Murmurs] : no murmurs present [Nail Clubbing] : no clubbing of the fingernails [Abnormal Walk] : normal gait [Abdomen Tenderness] : non-tender [Oriented To Time, Place, And Person] : oriented to person, place, and time [Skin Color & Pigmentation] : normal skin color and pigmentation [FreeTextEntry1] : mild + JVD

## 2019-07-08 NOTE — DISCUSSION/SUMMARY
[FreeTextEntry1] : EKG:NSR,PVC\par check labs\par cont toprol+ entresto+ lasix for CHF/HPTN/thoracic aorta. eliquis for PAF

## 2019-07-09 LAB
ANION GAP SERPL CALC-SCNC: 13 MMOL/L
BUN SERPL-MCNC: 27 MG/DL
CALCIUM SERPL-MCNC: 9.3 MG/DL
CHLORIDE SERPL-SCNC: 105 MMOL/L
CO2 SERPL-SCNC: 24 MMOL/L
CREAT SERPL-MCNC: 1.12 MG/DL
GLUCOSE SERPL-MCNC: 101 MG/DL
NT-PROBNP SERPL-MCNC: 2792 PG/ML
POTASSIUM SERPL-SCNC: 4.5 MMOL/L
SODIUM SERPL-SCNC: 142 MMOL/L

## 2019-07-12 NOTE — PHYSICAL EXAM
[Normal Appearance] : normal appearance [General Appearance - Well Developed] : well developed [Well Groomed] : well groomed [General Appearance - Well Nourished] : well nourished [General Appearance - In No Acute Distress] : no acute distress [Normal Conjunctiva] : the conjunctiva exhibited no abnormalities [No Deformities] : no deformities [Eyelids - No Xanthelasma] : the eyelids demonstrated no xanthelasmas [Normal Oral Mucosa] : normal oral mucosa [No Oral Pallor] : no oral pallor [No Oral Cyanosis] : no oral cyanosis [Normal Jugular Venous A Waves Present] : normal jugular venous A waves present [Normal Jugular Venous V Waves Present] : normal jugular venous V waves present [No Jugular Venous Castillo A Waves] : no jugular venous castillo A waves [Respiration, Rhythm And Depth] : normal respiratory rhythm and effort [Exaggerated Use Of Accessory Muscles For Inspiration] : no accessory muscle use [Auscultation Breath Sounds / Voice Sounds] : lungs were clear to auscultation bilaterally [Heart Rate And Rhythm] : heart rate and rhythm were normal [Heart Sounds] : normal S1 and S2 [Normal] : the heart rate was normal [Murmurs] : no murmurs present [Irregularly Irregular] : the rhythm was irregularly irregular [Abdomen Soft] : soft [Abdomen Tenderness] : non-tender [Abdomen Mass (___ Cm)] : no abdominal mass palpated [Abnormal Walk] : normal gait [Gait - Sufficient For Exercise Testing] : the gait was sufficient for exercise testing [Nail Clubbing] : no clubbing of the fingernails [Cyanosis, Localized] : no localized cyanosis [Petechial Hemorrhages (___cm)] : no petechial hemorrhages [Skin Color & Pigmentation] : normal skin color and pigmentation [] : no rash [No Venous Stasis] : no venous stasis [Skin Lesions] : no skin lesions [No Skin Ulcers] : no skin ulcer [No Xanthoma] : no  xanthoma was observed [Oriented To Time, Place, And Person] : oriented to person, place, and time [Affect] : the affect was normal [No Anxiety] : not feeling anxious [Mood] : the mood was normal [FreeTextEntry1] : Well healed sternotomy

## 2019-07-12 NOTE — REVIEW OF SYSTEMS
[Feeling Fatigued] : feeling fatigued [Shortness Of Breath] : shortness of breath [Dyspnea on exertion] : dyspnea during exertion [Joint Pain] : joint pain [Negative] : Heme/Lymph [Fever] : no fever [Headache] : no headache [Recent Weight Gain (___ Lbs)] : no recent weight gain [Recent Weight Loss (___ Lbs)] : no recent weight loss [Chills] : no chills [Chest  Pressure] : no chest pressure [Chest Pain] : no chest pain [Lower Ext Edema] : no extremity edema [Leg Claudication] : no intermittent leg claudication [Joint Swelling] : no joint swelling [Palpitations] : no palpitations [Joint Stiffness] : no joint stiffness [Muscle Cramps] : no muscle cramps [Limb Weakness (Paresis)] : no limb weakness

## 2019-07-12 NOTE — HISTORY OF PRESENT ILLNESS
[FreeTextEntry1] : 78 y.o. male patient with a h/o mitral and aortic valve surgery ~ 4 years ago with Dr. Davis at [facility].  He has been told that he may need a "device" - possibly CRT.  He also has a h/o Atrial fibrillation that was noted post operatively.  He is "not quite right" - he has poor stamina and gets easily fatigued.  He has been undergoing treatment for chronic fatigue syndrom / lyme disease for this. He has never had syncope, near syncope, palpitation, chest pain, orthopnea, lower extremity edema.  He presents for f/u discussion of ICD placement.  Entresto has been uptitrated by Dr. Zuñiga.  Tolerating other medications, including Toprol and Eliquis without issues.\par \par ECHO 4/2019 EF 20-25%, global LV hypokinesis, mild conc LVH, severe reversible diastolic dysfunciton (st III), mild-mod TR

## 2019-07-19 ENCOUNTER — APPOINTMENT (OUTPATIENT)
Dept: PULMONOLOGY | Facility: CLINIC | Age: 79
End: 2019-07-19
Payer: MEDICARE

## 2019-07-19 VITALS
BODY MASS INDEX: 27.23 KG/M2 | OXYGEN SATURATION: 97 % | WEIGHT: 219 LBS | HEART RATE: 42 BPM | SYSTOLIC BLOOD PRESSURE: 90 MMHG | DIASTOLIC BLOOD PRESSURE: 60 MMHG | HEIGHT: 75 IN | TEMPERATURE: 97.6 F

## 2019-07-19 DIAGNOSIS — Z99.89 OBSTRUCTIVE SLEEP APNEA (ADULT) (PEDIATRIC): ICD-10-CM

## 2019-07-19 DIAGNOSIS — G47.33 OBSTRUCTIVE SLEEP APNEA (ADULT) (PEDIATRIC): ICD-10-CM

## 2019-07-19 PROCEDURE — 99204 OFFICE O/P NEW MOD 45 MIN: CPT | Mod: 25

## 2019-07-19 PROCEDURE — 94727 GAS DIL/WSHOT DETER LNG VOL: CPT

## 2019-07-19 PROCEDURE — 94010 BREATHING CAPACITY TEST: CPT

## 2019-07-19 PROCEDURE — 94729 DIFFUSING CAPACITY: CPT

## 2019-07-19 NOTE — PHYSICAL EXAM
[General Appearance - Well Developed] : well developed [Normal Appearance] : normal appearance [Well Groomed] : well groomed [General Appearance - Well Nourished] : well nourished [No Deformities] : no deformities [General Appearance - In No Acute Distress] : no acute distress [Normal Conjunctiva] : the conjunctiva exhibited no abnormalities [Eyelids - No Xanthelasma] : the eyelids demonstrated no xanthelasmas [Normal Oropharynx] : normal oropharynx [Neck Appearance] : the appearance of the neck was normal [Neck Cervical Mass (___cm)] : no neck mass was observed [Jugular Venous Distention Increased] : there was no jugular-venous distention [Thyroid Diffuse Enlargement] : the thyroid was not enlarged [Thyroid Nodule] : there were no palpable thyroid nodules [Heart Rate And Rhythm] : heart rate and rhythm were normal [Heart Sounds] : normal S1 and S2 [Murmurs] : no murmurs present [Respiration, Rhythm And Depth] : normal respiratory rhythm and effort [Exaggerated Use Of Accessory Muscles For Inspiration] : no accessory muscle use [Auscultation Breath Sounds / Voice Sounds] : lungs were clear to auscultation bilaterally [Nail Clubbing] : no clubbing of the fingernails [Cyanosis, Localized] : no localized cyanosis [Petechial Hemorrhages (___cm)] : no petechial hemorrhages [] : no ischemic changes

## 2019-07-19 NOTE — HISTORY OF PRESENT ILLNESS
[Stable] : are stable [Difficulty Breathing During Exertion] : worsened dyspnea on exertion [Feelings Of Weakness On Exertion] : worsened exercise intolerance [Cough] : denies coughing [Wheezing] : denies wheezing [Regional Soft Tissue Swelling Both Lower Extremities] : denies lower extremity edema [Chest Pain Or Discomfort] : denies chest pain [FreeTextEntry1] : 78 yr old male with CHF, AAA, AFib, ROHAN on CPAP, s/p Aortic valve replacement and mitral valve replacement, non-smoker but second hand smoke exposure, nasal polyps s/p polypectomy.  Following with cardiology Dr. Zuñiga.  He is following his blood pressure at home.  He is currently on cefidnir for lyme disease.  \par \par His heart rate and BP is low and Dr. Zuñiga is taking off some of the BP medications.  He is off metoprolol.  He is currently taking 20 mg BID of laix and entresto.\par \par He is followed by Dr. Krishna who is putting him on defibrillator 8/7/2019.  \par \par Presents today to consultation for SOB and dizziness. SOB often has when walking often has to stop 2 times in one block.  He has SOB with walking up the stairs.  He denies wheezing, cough, fever.   He had an episode of bronchitis in May for 6 weeks and took AntibiOtic.  He worked as a  in [employer] bars and .  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 He does have night sweats.   \par \par He is wearing CPAP at night with full face mask 5 days out of 7 days a week. No snoring through the mask.  He fall

## 2019-07-19 NOTE — ASSESSMENT
[FreeTextEntry1] : SOB\par \par Discussed with pt could be multifocal due to afib, CHF, ROHAN, Pulmonary HTN. Pt BP is low today 90/60 and HR in the 40s.  Discussed CXR and PFT normal. Unlikely his SOB is related to lung condition most likely cardiac in origin.  He is to follow with Dr. Zuñiga, I have a call into Dr. Zuñiga to discuss the case.\par \par CXR reviewed from 7/16/2019 CD ROM \par \par - Clear lung without any pneumothorax or infiltrates.\par \par Echo 4/16/2010 - PSAP 60 mmhg,  ER 20-25% \par \par PFT 7/16/19 normal madhav, TLC and DLCO. \par \par History of second hand smoke exposure from parents and smoking in bar.\par \par ROHAN - pt is compliant with CPAP 5 out of 7 days a week.  Instructed to use nightly.

## 2019-07-23 ENCOUNTER — APPOINTMENT (OUTPATIENT)
Dept: ENDOCRINOLOGY | Facility: CLINIC | Age: 79
End: 2019-07-23

## 2019-07-30 ENCOUNTER — INPATIENT (INPATIENT)
Facility: HOSPITAL | Age: 79
LOS: 2 days | Discharge: HOME | DRG: 308 | End: 2019-08-02
Attending: INTERNAL MEDICINE | Admitting: INTERNAL MEDICINE
Payer: MEDICARE

## 2019-07-30 ENCOUNTER — NON-APPOINTMENT (OUTPATIENT)
Age: 79
End: 2019-07-30

## 2019-07-30 ENCOUNTER — APPOINTMENT (OUTPATIENT)
Dept: HEART AND VASCULAR | Facility: CLINIC | Age: 79
End: 2019-07-30
Payer: MEDICARE

## 2019-07-30 VITALS
WEIGHT: 222 LBS | BODY MASS INDEX: 27.75 KG/M2 | SYSTOLIC BLOOD PRESSURE: 120 MMHG | DIASTOLIC BLOOD PRESSURE: 80 MMHG | HEART RATE: 140 BPM

## 2019-07-30 VITALS
HEART RATE: 133 BPM | OXYGEN SATURATION: 97 % | RESPIRATION RATE: 18 BRPM | TEMPERATURE: 98 F | SYSTOLIC BLOOD PRESSURE: 95 MMHG | DIASTOLIC BLOOD PRESSURE: 58 MMHG

## 2019-07-30 DIAGNOSIS — Z95.2 PRESENCE OF PROSTHETIC HEART VALVE: Chronic | ICD-10-CM

## 2019-07-30 DIAGNOSIS — R63.8 OTHER SYMPTOMS AND SIGNS CONCERNING FOOD AND FLUID INTAKE: ICD-10-CM

## 2019-07-30 DIAGNOSIS — Z90.79 ACQUIRED ABSENCE OF OTHER GENITAL ORGAN(S): Chronic | ICD-10-CM

## 2019-07-30 DIAGNOSIS — I38 ENDOCARDITIS, VALVE UNSPECIFIED: ICD-10-CM

## 2019-07-30 DIAGNOSIS — Z98.890 OTHER SPECIFIED POSTPROCEDURAL STATES: Chronic | ICD-10-CM

## 2019-07-30 DIAGNOSIS — I48.92 UNSPECIFIED ATRIAL FLUTTER: ICD-10-CM

## 2019-07-30 DIAGNOSIS — Z91.89 OTHER SPECIFIED PERSONAL RISK FACTORS, NOT ELSEWHERE CLASSIFIED: ICD-10-CM

## 2019-07-30 DIAGNOSIS — I48.91 UNSPECIFIED ATRIAL FIBRILLATION: ICD-10-CM

## 2019-07-30 DIAGNOSIS — I50.22 CHRONIC SYSTOLIC (CONGESTIVE) HEART FAILURE: ICD-10-CM

## 2019-07-30 LAB
ALBUMIN SERPL ELPH-MCNC: 4.4 G/DL — SIGNIFICANT CHANGE UP (ref 3.3–5)
ALP SERPL-CCNC: 55 U/L — SIGNIFICANT CHANGE UP (ref 40–120)
ALT FLD-CCNC: 24 U/L — SIGNIFICANT CHANGE UP (ref 10–45)
ANION GAP SERPL CALC-SCNC: 13 MMOL/L — SIGNIFICANT CHANGE UP (ref 5–17)
AST SERPL-CCNC: 23 U/L — SIGNIFICANT CHANGE UP (ref 10–40)
BASOPHILS # BLD AUTO: 0.04 K/UL — SIGNIFICANT CHANGE UP (ref 0–0.2)
BASOPHILS NFR BLD AUTO: 0.8 % — SIGNIFICANT CHANGE UP (ref 0–2)
BILIRUB SERPL-MCNC: 0.7 MG/DL — SIGNIFICANT CHANGE UP (ref 0.2–1.2)
BUN SERPL-MCNC: 22 MG/DL — SIGNIFICANT CHANGE UP (ref 7–23)
CALCIUM SERPL-MCNC: 9.3 MG/DL — SIGNIFICANT CHANGE UP (ref 8.4–10.5)
CHLORIDE SERPL-SCNC: 107 MMOL/L — SIGNIFICANT CHANGE UP (ref 96–108)
CK MB CFR SERPL CALC: 4.6 NG/ML — SIGNIFICANT CHANGE UP (ref 0–6.7)
CO2 SERPL-SCNC: 22 MMOL/L — SIGNIFICANT CHANGE UP (ref 22–31)
CREAT SERPL-MCNC: 1.15 MG/DL — SIGNIFICANT CHANGE UP (ref 0.5–1.3)
EOSINOPHIL # BLD AUTO: 0.15 K/UL — SIGNIFICANT CHANGE UP (ref 0–0.5)
EOSINOPHIL NFR BLD AUTO: 3 % — SIGNIFICANT CHANGE UP (ref 0–6)
GLUCOSE SERPL-MCNC: 101 MG/DL — HIGH (ref 70–99)
HCT VFR BLD CALC: 42.9 % — SIGNIFICANT CHANGE UP (ref 39–50)
HGB BLD-MCNC: 14 G/DL — SIGNIFICANT CHANGE UP (ref 13–17)
IMM GRANULOCYTES NFR BLD AUTO: 0.4 % — SIGNIFICANT CHANGE UP (ref 0–1.5)
LYMPHOCYTES # BLD AUTO: 0.6 K/UL — LOW (ref 1–3.3)
LYMPHOCYTES # BLD AUTO: 12 % — LOW (ref 13–44)
MCHC RBC-ENTMCNC: 32.3 PG — SIGNIFICANT CHANGE UP (ref 27–34)
MCHC RBC-ENTMCNC: 32.6 GM/DL — SIGNIFICANT CHANGE UP (ref 32–36)
MCV RBC AUTO: 99.1 FL — SIGNIFICANT CHANGE UP (ref 80–100)
MONOCYTES # BLD AUTO: 0.57 K/UL — SIGNIFICANT CHANGE UP (ref 0–0.9)
MONOCYTES NFR BLD AUTO: 11.4 % — SIGNIFICANT CHANGE UP (ref 2–14)
NEUTROPHILS # BLD AUTO: 3.6 K/UL — SIGNIFICANT CHANGE UP (ref 1.8–7.4)
NEUTROPHILS NFR BLD AUTO: 72.4 % — SIGNIFICANT CHANGE UP (ref 43–77)
NRBC # BLD: 0 /100 WBCS — SIGNIFICANT CHANGE UP (ref 0–0)
NT-PROBNP SERPL-SCNC: 6637 PG/ML — HIGH (ref 0–300)
PLATELET # BLD AUTO: 178 K/UL — SIGNIFICANT CHANGE UP (ref 150–400)
POTASSIUM SERPL-MCNC: 4.6 MMOL/L — SIGNIFICANT CHANGE UP (ref 3.5–5.3)
POTASSIUM SERPL-SCNC: 4.6 MMOL/L — SIGNIFICANT CHANGE UP (ref 3.5–5.3)
PROT SERPL-MCNC: 7.5 G/DL — SIGNIFICANT CHANGE UP (ref 6–8.3)
RBC # BLD: 4.33 M/UL — SIGNIFICANT CHANGE UP (ref 4.2–5.8)
RBC # FLD: 14.3 % — SIGNIFICANT CHANGE UP (ref 10.3–14.5)
SODIUM SERPL-SCNC: 142 MMOL/L — SIGNIFICANT CHANGE UP (ref 135–145)
TROPONIN T SERPL-MCNC: 0.01 NG/ML — SIGNIFICANT CHANGE UP (ref 0–0.01)
WBC # BLD: 4.98 K/UL — SIGNIFICANT CHANGE UP (ref 3.8–10.5)
WBC # FLD AUTO: 4.98 K/UL — SIGNIFICANT CHANGE UP (ref 3.8–10.5)

## 2019-07-30 PROCEDURE — 92960 CARDIOVERSION ELECTRIC EXT: CPT

## 2019-07-30 PROCEDURE — 71045 X-RAY EXAM CHEST 1 VIEW: CPT | Mod: 26

## 2019-07-30 PROCEDURE — 93010 ELECTROCARDIOGRAM REPORT: CPT

## 2019-07-30 PROCEDURE — 99214 OFFICE O/P EST MOD 30 MIN: CPT | Mod: 25

## 2019-07-30 PROCEDURE — 99291 CRITICAL CARE FIRST HOUR: CPT

## 2019-07-30 PROCEDURE — 93000 ELECTROCARDIOGRAM COMPLETE: CPT

## 2019-07-30 RX ORDER — DIGOXIN 250 MCG
0.25 TABLET ORAL ONCE
Refills: 0 | Status: COMPLETED | OUTPATIENT
Start: 2019-07-30 | End: 2019-07-30

## 2019-07-30 RX ORDER — METOPROLOL TARTRATE 50 MG
5 TABLET ORAL ONCE
Refills: 0 | Status: COMPLETED | OUTPATIENT
Start: 2019-07-30 | End: 2019-07-30

## 2019-07-30 RX ORDER — DIGOXIN 250 MCG
0.25 TABLET ORAL EVERY 6 HOURS
Refills: 0 | Status: DISCONTINUED | OUTPATIENT
Start: 2019-07-30 | End: 2019-07-30

## 2019-07-30 RX ORDER — SODIUM CHLORIDE 9 MG/ML
250 INJECTION INTRAMUSCULAR; INTRAVENOUS; SUBCUTANEOUS ONCE
Refills: 0 | Status: COMPLETED | OUTPATIENT
Start: 2019-07-30 | End: 2019-07-30

## 2019-07-30 RX ORDER — FUROSEMIDE 40 MG
20 TABLET ORAL EVERY 12 HOURS
Refills: 0 | Status: DISCONTINUED | OUTPATIENT
Start: 2019-07-30 | End: 2019-08-02

## 2019-07-30 RX ORDER — SACUBITRIL AND VALSARTAN 24; 26 MG/1; MG/1
1 TABLET, FILM COATED ORAL
Refills: 0 | Status: DISCONTINUED | OUTPATIENT
Start: 2019-07-30 | End: 2019-08-01

## 2019-07-30 RX ORDER — DIGOXIN 250 MCG
0.25 TABLET ORAL EVERY 6 HOURS
Refills: 0 | Status: DISCONTINUED | OUTPATIENT
Start: 2019-07-30 | End: 2019-07-31

## 2019-07-30 RX ORDER — SODIUM CHLORIDE 9 MG/ML
500 INJECTION INTRAMUSCULAR; INTRAVENOUS; SUBCUTANEOUS ONCE
Refills: 0 | Status: COMPLETED | OUTPATIENT
Start: 2019-07-30 | End: 2019-07-30

## 2019-07-30 RX ORDER — APIXABAN 2.5 MG/1
5 TABLET, FILM COATED ORAL EVERY 12 HOURS
Refills: 0 | Status: DISCONTINUED | OUTPATIENT
Start: 2019-07-30 | End: 2019-08-02

## 2019-07-30 RX ADMIN — Medication 0.25 MILLIGRAM(S): at 11:49

## 2019-07-30 RX ADMIN — Medication 5 MILLIGRAM(S): at 10:17

## 2019-07-30 RX ADMIN — SODIUM CHLORIDE 500 MILLILITER(S): 9 INJECTION INTRAMUSCULAR; INTRAVENOUS; SUBCUTANEOUS at 13:35

## 2019-07-30 RX ADMIN — Medication 0.25 MILLIGRAM(S): at 23:51

## 2019-07-30 RX ADMIN — SODIUM CHLORIDE 500 MILLILITER(S): 9 INJECTION INTRAMUSCULAR; INTRAVENOUS; SUBCUTANEOUS at 10:17

## 2019-07-30 RX ADMIN — APIXABAN 5 MILLIGRAM(S): 2.5 TABLET, FILM COATED ORAL at 22:12

## 2019-07-30 NOTE — H&P ADULT - PROBLEM SELECTOR PLAN 4
F: No IVF  N: DASH/TLC diet. NPO pending DCCV  E: Replete lytes PRN K<4, Mg<2  P: DVT PPX: on Eliquis  C: FULL CODE  Dispo: Admit to tele 5 [facility]

## 2019-07-30 NOTE — ED ADULT TRIAGE NOTE - OTHER COMPLAINTS
pt c.o palpitations since yesterday. pt states "I feel a little dizzy."  seen at md office this am, sent to ed for elevated HR. admits to taking eliquis daily. recently stopped taking metoprolol. ekg in progress.

## 2019-07-30 NOTE — DISCUSSION/SUMMARY
[FreeTextEntry1] : EKG:probable flutter with IVCD\par due for IC BB stopped 7-10 days ago due to hypotension\par need to go to ER( doesn't want EMS)\par cont entresto+lasix for cHF/ICM \par eliquis for af

## 2019-07-30 NOTE — REVIEW OF SYSTEMS
[Fever] : no fever [Headache] : no headache [Recent Weight Gain (___ Lbs)] : no recent weight gain [Chills] : no chills [Feeling Fatigued] : feeling fatigued [Recent Weight Loss (___ Lbs)] : no recent weight loss [see HPI] : see HPI [Loss Of Hearing] : hearing loss [Cough] : cough [Wheezing] : no wheezing [Coughing Up Blood] : no hemoptysis [Hematuria] : no hematuria [Nocturia] : nocturia [Joint Pain] : joint pain [Knee Problem] : knee problems [Dizziness] : dizziness [Tremor] : no tremor was seen [Numbness (Hypesthesia)] : no numbness [Convulsions] : no convulsions [Confusion] : no confusion was observed [Tingling (Paresthesia)] : no tingling [Memory Lapses Or Loss] : memory lapses or loss [Depression] : no depression [Anxiety] : no anxiety [Under Stress] : under stress [Suicidal] : not suicidal [Easy Bleeding] : no tendency for easy bleeding [Easy Bruising] : a tendency for easy bruising [Negative] : Integumentary

## 2019-07-30 NOTE — H&P ADULT - ASSESSMENT
78 y/o M w/ PMHx of chronic systolic CHF (EF 20-25% on echo 4/2019), Afib s/p DCCV 2014 (on Eliquis), bioprosthetic AVR/MVR at [facility] 12/2014, TURP, ROHAN on CPAP, Lyme disease, presents sent from Cardiologist Dr Zuñiga's office for Afib RVR 130s.  Admitted to tele 5 [facility] for Afib RVR management. 80 y/o M w/ PMHx of chronic systolic CHF (EF 20-25% on echo 4/2019), Afib s/p DCCV 2014 (on Eliquis), bioprosthetic AVR/MVR at [facility] 12/2014, TURP, ROHAN on CPAP, Lyme disease s/p Abx, presents sent from Cardiologist Dr Zuñiga's office for Afib RVR 130s.  Admitted to tele 5 [facility] for Afib RVR management.

## 2019-07-30 NOTE — H&P ADULT - NSICDXPASTSURGICALHX_GEN_ALL_CORE_FT
PAST SURGICAL HISTORY:  H/O right inguinal hernia repair     History of heart valve replacement x2    History of total knee replacement S/P knee replacement, left    Other postprocedural status 10 yr ago    S/P TURP (status post transurethral resection of prostate)

## 2019-07-30 NOTE — H&P ADULT - PROBLEM SELECTOR PLAN 1
-140s a/w lightheadedness, sob on exertion, and decreased exercise tolerance. SBP 80-90s. S/p Metoprolol 5mg IV in ED.  -EP consulted. Plan for DCCV today. Keep NPO  -S/p Digoxin 0.25mg IV in ED. c/w Digoxin load 0.25mg q6h IV x2  -Discuss initiation of maintenance Digoxin w/ EP  -c/w Eliquis 5mg BID

## 2019-07-30 NOTE — HISTORY OF PRESENT ILLNESS
[FreeTextEntry1] : has had chronic sob- took subway here today\par on abx for pNA\par noticed palpitations since yesterday, no cp

## 2019-07-30 NOTE — ED ADULT NURSE NOTE - PMH
Atrial fibrillation    Congestive heart failure (CHF)    Varicose veins of lower extremities with complications  Varicose veins of both legs with edema

## 2019-07-30 NOTE — PATIENT PROFILE ADULT - NSPROGENANESREACTION_GEN_A_NUR
[facility] Prior Authorization Team   Phone: [phone]  Fax: [phone]    PA Initiation    Medication: Urgent PA needed for Mac Contour Next test strips, 6 per day to use with her Medtronic insulin pump to make it an all-in-one system the safest delivery of care  Insurance Company: [employer] - Phone [phone] Fax [phone]  Pharmacy Filling the Rx: [facility] [address] [place] [address] AT [address] [place] [address] & [facility]  Filling Pharmacy Phone: [phone]  Filling Pharmacy Fax: [phone]  Start Date: 5/4/2017      
Prior Authorization Approval    Authorization Effective Date: 5/4/2017  Authorization Expiration Date: 5/4/2018  Medication: Mac Contour Next test strips - APPROVED  Approved Dose/Quantity:   Reference #:     Insurance Company: [employer] - Phone [phone] Fax [phone]  Expected CoPay: $0.00     CoPay Card Available:      Foundation Assistance Needed:    Which Pharmacy is filling the prescription (Not needed for infusion/clinic administered): [facility] [address] [place], [place] [address]A AVE AT [address]  Pharmacy Notified: Yes  Patient Notified: YesComment:  LEFT VOICE MESSAGE      
Prior Authorization Retail Medication Request  Medication/Dose: Urgent PA needed for Mac Contour Next test strips, 6 per day to use with her Medtronic insulin pump to make it an all-in-one system the safest delivery of care  Diagnosis and ICD code: E10.65  New/Renewal/Insurance Change PA: NEW  Previously Tried and Failed Therapies: this is the only meter/test strip that works with her Medtronic pump    Insurance: per what's in EPIC    If you received a fax notification from an outside Pharmacy:  Pharmacy Name: [facility] (in her patient profile)    Thank you!   
Spoke with pharmacy, pharmacist stated they do not have current insurance for patient.  Unable to do PA at this time.  
no previous reaction

## 2019-07-30 NOTE — H&P ADULT - NSHPOUTPATIENTPROVIDERS_GEN_ALL_CORE
Cardiologist: Dr. Zuñiga  EP: Dr. Ortiz  Pulm: Dr. Rice  CTS: [facility] (saw for enlarged Asc Ao 5/2019 - no intervention/did not meet criteria) Cardiologist: Dr. Zuñiga  EP: Dr. Ortiz  Pulm: Dr. Rice  CTS: [facility] (saw for enlarged Ascending Aorta 5/2019 - no intervention/did not meet criteria)

## 2019-07-30 NOTE — CONSULT NOTE ADULT - SUBJECTIVE AND OBJECTIVE BOX
HPI:    Mr. Macario is a pleasant 79 year-old male PMHx Lyme Disease (being followed by Dr. López, treated with antibiotics, off for a year now), MVR/AVR with Dr. Davis at [facility] 12/2014, chronic systolic CHF reduced EF (20-25%), A fib (on Eliquis, being followed by Dr. Ortiz scheduled for dual chamber ICD 8/7/2019), ROHAN on CPAP who presented to [facility] 7/30/19 for ALCANTAR and palpitations found to be in AF 's and hypotensive to 88/50 (normally 120's/60's). Treated with 5mg IV Lopressor and 500cc NS for hydration - with no change in HR. Digoxin 0.25mg IV. Admitted to cardiology for rate control. EP consulted for further management.     Patient reports feeling a "building of shortness of breath" over the last few weeks and yesterday it "peaked" while out running errands. He denies CP but reports feeling palpitations. Home Toprol stopped x 1 week given feelings of fatigue and weakness. This morning he awoke feeling "overly caffeinated" with palpitations, presented to Dr. Zuñiga and found to be in AF on EKG and sent to ED.     Cards: Dr. Zuñiga  EP: Dr. Ortiz  Pulm: Dr. Rice  CTS: [facility] (saw for enlarged Asc Ao 5/2019 - no intervention/did not meet criteria)      PAST MEDICAL & SURGICAL HISTORY:  Congestive heart failure (CHF)  Atrial fibrillation  Varicose veins of lower extremities with complications: Varicose veins of both legs with edema  H/O right inguinal hernia repair  History of heart valve replacement: x2  S/P TURP (status post transurethral resection of prostate)  History of total knee replacement: S/P knee replacement, left  Other postprocedural status: 10 yr ago    Social History:  no smoking, no illicits, no alcohol    pertinent home medications:  *taken off of Toprol 12.5mg BID x 1 week  Eliquis 5mg BID (uninterrupted x 4 years)  Lasix 20mg BID  Entresto 24/26mg BID      Inpatient Medications:   500cc NS bolus x 1  lopressor 5mg Iv x 1  digoxin  Injectable 0.25 milliGRAM(s) IV Push every 6 hours      Allergies: Zithromax (Unknown)      ROS:   CONSTITUTIONAL: No fever, weight loss + fatigue  EYES: Pt denies  RESPIRATORY: No cough, wheezing, chills or hemoptysis; No Shortness of Breath  CARDIOVASCULAR: see HPI  GASTROINTESTINAL: Pt denies  NEUROLOGICAL: Pt denies  SKIN: Pt denies   PSYCHIATRIC: Pt denies  HEME/LYMPH: Pt denies    PHYSICAL:  T(C): 36.6 (07-30-19 @ 09:48), Max: 36.6 (07-30-19 @ 09:48)  HR: 122 (07-30-19 @ 12:52) (122 - 138)  BP: 87/65 (07-30-19 @ 12:52) (80/56 - 141/64)  RR: 16 (07-30-19 @ 12:52) (16 - 18)  SpO2: 98% (07-30-19 @ 12:52) (97% - 99%)    Appearance: No acute distress, well developed  Eyes: normal appearing conjunctiva, pupils and eyelids  Cardiovascular: Normal S1 S2, No JVD, No murmurs, No edema  Respiratory: mild bibasilar crackles, unlabored, symmetrical, 95% RA, dry cough intermittently  Gastrointestinal:  Soft, NT/ND 	  Neurologic:  No deficit noted  Psych: A&Ox3, normal mood/affect  Musculoskeletal: normal gait  Skin: no rash noted, normal color and pigmentation.        LABS:                        14.0   4.98  )-----------( 178      ( 30 Jul 2019 10:26 )             42.9     07-30    142  |  107  |  22  ----------------------------<  101<H>  4.6   |  22  |  1.15    Ca    9.3      30 Jul 2019 10:26    TPro  7.5  /  Alb  4.4  /  TBili  0.7  /  DBili  x   /  AST  23  /  ALT  24  /  AlkPhos  55  07-30      TSH  Troponin    EKG:     Telemetry: 's-140's     ECHO 4/2019 EF 20-25%, global LV hypokinesis, mild conc LVH, severe reversible diastolic dysfunciton (st III), mild-mod TR   Echo 4/16/19: Aortic root 4.1cm, ascending aorta 4.8cm, bioprosthetic mitral valve replacement with minimal MR, bioprosthetic aortic valve replacement with no AR, EF 20-25%, severe reversible diastolic dysfunction (stage III).    Prior EP procedures: denies - had a DCCV for AF shortly after AVR/MVR years ago, otherwise nothing else.     Cath / stress / Cardiac CTa:    Assessment Plan:  79 year-old male PMHx Lyme Disease, MVR/AVR 2014, chronic systolic CHF reduced EF (20-25%), AF (on Eliquis, being followed by Dr. Ortiz scheduled for dual chamber ICD 8/7/2019), ROHAN on CPAP who presented to [facility] 7/30/19 for ALCANTAR and palpitations found to be in AF 's and hypotensive to 88/50 (normally 120's/60's). Treated with 5mg IV Lopressor and 500cc NS for hydration - with no change in HR. Digoxin 0.25mg IV. Admitted to cardiology for rate control. EP consulted for further management. HPI:    Mr. Macario is a pleasant 79 year-old male PMHx Lyme Disease (being followed by Dr. López, treated with antibiotics, off for a year now), MVR/AVR with Dr. Davis at [facility] 12/2014, chronic systolic CHF reduced EF (20-25%), A fib (on Eliquis, being followed by Dr. Ortiz scheduled for dual chamber ICD 8/7/2019), ROHAN on CPAP who presented to [facility] 7/30/19 for ALCANTAR and palpitations found to be in AF 's and hypotensive to 88/50 (normally 120's/60's). Treated with 5mg IV Lopressor and 500cc NS for hydration - with no change in HR. Digoxin 0.25mg IV. Admitted to cardiology for rate control. EP consulted for further management.     Patient reports feeling a "building of shortness of breath" over the last few weeks and yesterday it "peaked" while out running errands. He denies CP but reports feeling palpitations. Home Toprol stopped x 1 week given feelings of fatigue and weakness. This morning he awoke feeling "overly caffeinated" with palpitations, presented to Dr. Zuñiga and found to be in AF on EKG and sent to ED.     Cards: Dr. Zuñiga  EP: Dr. Ortiz  Pulm: Dr. Rice  CTS: [facility] (saw for enlarged Asc Ao 5/2019 - no intervention/did not meet criteria)      PAST MEDICAL & SURGICAL HISTORY:  Congestive heart failure (CHF)  Atrial fibrillation  Varicose veins of lower extremities with complications: Varicose veins of both legs with edema  H/O right inguinal hernia repair  History of heart valve replacement: x2  S/P TURP (status post transurethral resection of prostate)  History of total knee replacement: S/P knee replacement, left  Other postprocedural status: 10 yr ago    Social History:  no smoking, no illicits, no alcohol    pertinent home medications:  *taken off of Toprol 12.5mg BID x 1 week  Eliquis 5mg BID (uninterrupted x 4 years)  Lasix 20mg BID  Entresto 24/26mg BID      Inpatient Medications:   500cc NS bolus x 1  lopressor 5mg Iv x 1  digoxin  Injectable 0.25 milliGRAM(s) IV Push every 6 hours      Allergies: Zithromax (Unknown)      ROS:   CONSTITUTIONAL: No fever, weight loss + fatigue  EYES: Pt denies  RESPIRATORY: No cough, wheezing, chills or hemoptysis; No Shortness of Breath  CARDIOVASCULAR: see HPI  GASTROINTESTINAL: Pt denies  NEUROLOGICAL: Pt denies  SKIN: Pt denies   PSYCHIATRIC: Pt denies  HEME/LYMPH: Pt denies    PHYSICAL:  T(C): 36.6 (07-30-19 @ 09:48), Max: 36.6 (07-30-19 @ 09:48)  HR: 122 (07-30-19 @ 12:52) (122 - 138)  BP: 87/65 (07-30-19 @ 12:52) (80/56 - 141/64)  RR: 16 (07-30-19 @ 12:52) (16 - 18)  SpO2: 98% (07-30-19 @ 12:52) (97% - 99%)    Appearance: No acute distress, well developed  Eyes: normal appearing conjunctiva, pupils and eyelids  Cardiovascular: Normal S1 S2, No JVD, No murmurs, No edema  Respiratory: mild bibasilar crackles, unlabored, symmetrical, 95% RA, dry cough intermittently  Gastrointestinal:  Soft, NT/ND 	  Neurologic:  No deficit noted  Psych: A&Ox3, normal mood/affect  Musculoskeletal: normal gait  Skin: no rash noted, normal color and pigmentation.        LABS:                        14.0   4.98  )-----------( 178      ( 30 Jul 2019 10:26 )             42.9     07-30    142  |  107  |  22  ----------------------------<  101<H>  4.6   |  22  |  1.15    Ca    9.3      30 Jul 2019 10:26    TPro  7.5  /  Alb  4.4  /  TBili  0.7  /  DBili  x   /  AST  23  /  ALT  24  /  AlkPhos  55  07-30      EKG:     Telemetry: 's-140's     ECHO 4/2019 EF 20-25%, global LV hypokinesis, mild conc LVH, severe reversible diastolic dysfunciton (st III), mild-mod TR   Echo 4/16/19: Aortic root 4.1cm, ascending aorta 4.8cm, bioprosthetic mitral valve replacement with minimal MR, bioprosthetic aortic valve replacement with no AR, EF 20-25%, severe reversible diastolic dysfunction (stage III).    Prior EP procedures: denies - had a DCCV for AF shortly after AVR/MVR years ago, otherwise nothing else.     Assessment Plan:  79 year-old male PMHx Lyme Disease, MVR/AVR 2014, chronic systolic CHF reduced EF (20-25%), AF (on Eliquis, being followed by Dr. Ortiz scheduled for dual chamber ICD 8/7/2019), ROHAN on CPAP who presented to [facility] 7/30/19 for ALCANTAR and palpitations found to be in AF 's and hypotensive to 88/50 (normally 120's/60's). Treated with 5mg IV Lopressor and 500cc NS for hydration - with no change in HR. Digoxin 0.25mg IV. Admitted to cardiology for rate control. EP consulted for further management.     -Keep NPO  -Plan for DCCV today, last Eliquis this morning  -No need for JIM, uninterrupted Eliquis x 4 years.   -will consider antiarrhythmic post DCCV.     Plan discussed with patient and Dr. Duran. All questions answered.

## 2019-07-30 NOTE — ED PROVIDER NOTE - CLINICAL SUMMARY MEDICAL DECISION MAKING FREE TEXT BOX
79M PMH CHF, valve repair, TURP, afib on eliquis, p/w lightheaded. Pt taken off metoprolol ~1w ago for minimal dizziness. Now w/ several days of feeling SOB, exhausted, lightheaded. today also w/ palpitations. Currently at rest feeling much better, just minimal lightheaded. Went to cards Dr. Zuñiga ([phone]) today who referred to ED for tachycardia. Tachycardic. Initial borderline BP. However on my initial encounter in room, BP was 130s/60s. Other vitals wnl. Exam as above.  ddx: afib RVR, hemodynamically stable. Likely 2/2 heat, stopping metoprolol.   d/w Dr. Zuñiga, requesting EP consult.  CBC, cmp, trop, bnp, judicious IVF, lopressor.

## 2019-07-30 NOTE — H&P ADULT - HISTORY OF PRESENT ILLNESS
78 y/o M w/ PMHx of chronic systolic CHF (EF 20-25% on echo 4/2019), Afib s/p DCCV 2014 on Eliquis, AVR/MVR 2014, TURP, ROHAN on CPAP, presents sent from Cardiologist Dr Zuñiga's office for Afib RVR. Pt reports for the past week he's been feeling lightheaded and Metoprolol was d/c'ed by Dr Zuñiga. This morning he felt lightheaded, a/w ALCANTAR, and decreased exercise 80 y/o M w/ PMHx of chronic systolic CHF (EF 20-25% on echo 4/2019), Afib s/p DCCV 2014 (on Eliquis), bioprosthetic AVR/MVR at [facility] 12/2014, TURP, ROHAN on CPAP, Lyme disease, presents sent from Cardiologist Dr Zuñiga's office for Afib RVR. Pt reports for the past week he's been feeling lightheaded and hypotensive, and Metoprolol was d/c'ed by Dr Zuñiga. This morning he felt episode of lightheaded, sob on exertion, and had decreased exercise tolerance to <1 block. He was found to have rapid Afib w/ HR 140s in the office and was sent to ED. Wife reports pt tends to have lightheadedness episodes a/w ALCANTAR and trouble walking due to symptoms and usually self resolves. While in ED pt reports feeling at his baseline. Denies chest pain, palpitations, fever, chills, cough, dysuria, recent travel. Reports compliance w/ medications including Eliquis.  In ED: BP 81//64, HR 130s, R 16, SpO2 98% RA, T 97.8. Trop neg, BNP 6630. CXR w/ cardiomegaly, mild pulm vasc congestion. EKG showed Afib RVR 130s, LBBB (appears incomplete LBBB in old EKGs). He received NS 500cc IV, Metoprolol 5mg IV, and Digoxin 0.25mg IV. Admitted to tele 5 [facility] for Afib RVR management. 80 y/o M w/ PMHx of chronic systolic CHF (EF 20-25% on echo 4/2019), Afib s/p DCCV 2014 (on Eliquis), bioprosthetic AVR/MVR at [facility] 12/2014, TURP, ROHAN on CPAP, Lyme disease, presents sent from Cardiologist Dr Zuñiga's office for Afib RVR. Pt reports for the past week he's been feeling lightheaded and hypotensive, and Metoprolol was d/c'ed by Dr Zuñiga. This morning he felt episode of lightheaded, sob on exertion, and had decreased exercise tolerance to <1 block. He was found to have rapid Afib w/ HR 140s in the office and was sent to ED. Wife reports pt tends to have lightheadedness episodes a/w ALCANTAR and trouble walking due to symptoms and usually self resolves. While in ED pt reports feeling at his baseline. Denies chest pain, palpitations, fever, chills, cough, dysuria, recent travel. Reports compliance w/ medications including Eliquis. Sleeps with 3 pillows at baseline and is unchanged.  In ED: BP 81//64, HR 130s, R 16, SpO2 98% RA, T 97.8. Trop neg, BNP 6630. CXR w/ cardiomegaly, mild pulm vasc congestion. EKG showed Afib RVR 130s, LBBB (appears incomplete LBBB in old EKGs). He received NS 500cc IV, Metoprolol 5mg IV, and Digoxin 0.25mg IV. Admitted to tele 5 [facility] for Afib RVR management.

## 2019-07-30 NOTE — PHYSICAL EXAM
[General Appearance - Well Developed] : well developed [Normal Appearance] : normal appearance [Well Groomed] : well groomed [General Appearance - Well Nourished] : well nourished [No Deformities] : no deformities [General Appearance - In No Acute Distress] : no acute distress [Normal Conjunctiva] : the conjunctiva exhibited no abnormalities [Normal Oral Mucosa] : normal oral mucosa [FreeTextEntry1] : mild + JVD [Respiration, Rhythm And Depth] : normal respiratory rhythm and effort [] : no respiratory distress [Auscultation Breath Sounds / Voice Sounds] : lungs were clear to auscultation bilaterally [Exaggerated Use Of Accessory Muscles For Inspiration] : no accessory muscle use [Heart Rate And Rhythm] : heart rate and rhythm were normal [Heart Sounds] : normal S1 and S2 [Murmurs] : no murmurs present [Bowel Sounds] : normal bowel sounds [Abdomen Soft] : soft [Abdomen Tenderness] : non-tender [Abnormal Walk] : normal gait [Nail Clubbing] : no clubbing of the fingernails [Skin Color & Pigmentation] : normal skin color and pigmentation [Oriented To Time, Place, And Person] : oriented to person, place, and time

## 2019-07-30 NOTE — H&P ADULT - NSHPLABSRESULTS_GEN_ALL_CORE
14.0   4.98  )-----------( 178      ( 30 Jul 2019 10:26 )             42.9       07-30    142  |  107  |  22  ----------------------------<  101<H>  4.6   |  22  |  1.15    Ca    9.3      30 Jul 2019 10:26    TPro  7.5  /  Alb  4.4  /  TBili  0.7  /  DBili  x   /  AST  23  /  ALT  24  /  AlkPhos  55  07-30          CARDIAC MARKERS ( 30 Jul 2019 10:26 )  x     / 0.01 ng/mL / 145 U/L / x     / 4.6 ng/mL

## 2019-07-30 NOTE — H&P ADULT - NSICDXPASTMEDICALHX_GEN_ALL_CORE_FT
PAST MEDICAL HISTORY:  Atrial fibrillation     Congestive heart failure (CHF)     Varicose veins of lower extremities with complications Varicose veins of both legs with edema

## 2019-07-30 NOTE — H&P ADULT - PROBLEM SELECTOR PLAN 5
Problem: Transition of care performed with sharing of clinical summary.  Plan: 1) PCP Contacted on Admission: (Y/N) --> Dr Bowen Zuñiga following  2) Date of Contact with PCP: 7/30/19  3) PCP Contacted at Discharge: (Y/N)  4) Summary of Handoff Given to PCP:   5) Post-Discharge Appointment Date and Location:

## 2019-07-30 NOTE — ED ADULT NURSE NOTE - INTERVENTIONS DEFINITIONS
Monitor gait and stability/Physically safe environment: no spills, clutter or unnecessary equipment/Instruct patient to call for assistance

## 2019-07-30 NOTE — ED PROVIDER NOTE - OBJECTIVE STATEMENT
79M PMH CHF, valve repair, TURP, afib on eliquis, p/w lightheaded. Pt taken off metoprolol ~1w ago for minimal dizziness. Now w/ several days of feeling SOB, exhausted, lightheaded. today also w/ palpitations. Currently at rest feeling much better, just minimal lightheaded. Went to cards Dr. Zuñiga ([phone]) today who referred to ED for tachycardia. Denies associated NVD, diaphoresis, cough/rhinorrhea, black/bloody stool, LE pain/swelling, focal weakness/numbness, recent travel/immobilization, abd pain, urinary complaints, f/c. Scheduled for AICD ~1w from now, Dr. Morley.

## 2019-07-30 NOTE — H&P ADULT - PROBLEM SELECTOR PLAN 3
Hx bioprosthetic AVR/MVR in [facility] 12/2014.   -Monitor  -Echo in 4/2019 w/ EF 20-25%, global LV hypokinesis, mild conc LVH, severe reversible diastolic dysfunciton (st III), mild-mod TR, Aortic root 4.1cm, ascending aorta 4.8cm, bioprosthetic mitral valve replacement with minimal MR, bioprosthetic aortic valve replacement with no AR, EF 20-25%, severe reversible diastolic dysfunction (stage III).

## 2019-07-30 NOTE — H&P ADULT - PROBLEM SELECTOR PLAN 2
EF 20-25% on echo 4/2019. EF 20-25% on echo 4/2019. Chronic 3 pillow orthopnea unchanged.   -Appears mildly overloaded on exam. Possibly 2/2 rapid Afib  -Assess need for IV diuresis tomorrow AM  -Follows w/ EP Dr Ortiz, was scheduled for outpatient AICD implantation next month in setting of low EF 20-25%

## 2019-07-30 NOTE — ED ADULT NURSE NOTE - OBJECTIVE STATEMENT
Pt. sent from cardiologist MD Zuñiga office (had follow up appt. regarding future pacemaker placement), due to 140s HR and c/o dizziness since this AM. Pt. also reports weakness "from heat" x 2 days. Pt. also states he stopped his PO metoprolol x one week due to lightheadedness side effects. Upon ED arrival, pt. states he feels more relaxed and no longer has dizziness. Denies SOB or CP, speaking in clear complete sentences on RA. EKG performed in triage, rapid afib, reviewed by MD Lange. Pt. placed on tele monitor in ED. Pt. w/ Hx of CHF and afib on eliquis sent from cardiologist MD Zuñiga office (had follow up appt. regarding future pacemaker placement), due to 140s HR and c/o dizziness since this AM. Pt. also reports weakness "from heat" x 2 days. Pt. also states he stopped his PO metoprolol x one week due to lightheadedness side effects. Upon ED arrival, pt. states he feels more relaxed and no longer has dizziness. Denies SOB or CP, speaking in clear complete sentences on RA. EKG performed in triage, rapid afib, reviewed by MD Lange. Pt. placed on tele monitor in ED.

## 2019-07-30 NOTE — H&P ADULT - NSHPREVIEWOFSYSTEMS_GEN_ALL_CORE
GENERAL, CONSTITUTIONAL : denies recent weight loss, fever, chills  EYES, VISION: denies changes in vision   EARS, NOSE, THROAT: denies hearing loss  HEART, CARDIOVASCULAR: denies chest pain, palpitations, LE edema, claudication. +  arrhythmia, sob  RESPIRATORY: Denies cough, wheezing, PND. +sob, ALCANTAR, orthopnea  GASTROINTESTINAL: Denies abdominal pain, epigastric pain, nausea, vomiting, or hematemesis, diarrhea, constipation, melena or hematochezia.  GENITOURINARY: Denies frequent urination, urgency  MUSCULOSKELETAL denies joint pain or swelling, restricted motion, musculoskeletal pain.   SKIN & INTEGUMENTARY Denies rashes, sores, blisters, blisters, growths.  NEUROLOGICAL: Denies numbness or tingling sensations, sensation loss, burning.   PSYCHIATRIC: Denies nervousness, anxiety, depression  ENDOCRINE Denies heat or cold intolerance, excessive thirst  HEMATOLOGIC/LYMPHATIC: Denies abnormal bleeding, bleeding of any kind

## 2019-07-30 NOTE — ED ADULT NURSE NOTE - PSH
History of heart valve replacement  x2  History of total knee replacement  S/P knee replacement, left  Other postprocedural status  10 yr ago  S/P TURP (status post transurethral resection of prostate) H/O right inguinal hernia repair    History of heart valve replacement  x2  History of total knee replacement  S/P knee replacement, left  Other postprocedural status  10 yr ago  S/P TURP (status post transurethral resection of prostate)

## 2019-07-30 NOTE — H&P ADULT - NSHPPHYSICALEXAM_GEN_ALL_CORE
Appearance: Normal	  HEENT:   Normal oral mucosa   Neck: Supple, + JVD  Cardiovascular: Irregularly irregular, tachycardic, No JVD, No murmurs  Respiratory: Lungs w/ bibasilar crackles/No Rales, Rhonchi, Wheezing	  Gastrointestinal:  Soft, Non-tender, + BS	  Skin: No rashes, No ecchymoses, No cyanosis  Extremities: Normal range of motion, No clubbing, cyanosis. Abelino trace LE edema  Vascular: Femoral pulses 2+ b/l without bruit, DP 1+ b/l, PT 1+ b/l  Neurologic: Non-focal  Psychiatry: A & O x 3, Mood & affect appropriate Appearance: Normal	  HEENT:   Normal oral mucosa   Neck: Supple, + JVD  Cardiovascular: Irregularly irregular, tachycardic, No JVD, No murmurs  Respiratory: Lungs w/ bibasilar crackles/No Rhonchi, Wheezing	  Gastrointestinal:  Soft, Non-tender, + BS	  Skin: No rashes, No ecchymoses, No cyanosis  Extremities: Normal range of motion, No clubbing, cyanosis. Abelino trace LE edema  Vascular: Femoral pulses 2+ b/l without bruit, DP 1+ b/l, PT 1+ b/l  Neurologic: Non-focal  Psychiatry: A & O x 3, Mood & affect appropriate

## 2019-07-30 NOTE — ED PROVIDER NOTE - PROGRESS NOTE DETAILS
[place]: bnp 6000s, other labs grossly wnl. Still tachy 130s, hypotensive 90s/60s but feeling much better, has no cp/lightheaded. d/w dr. friedman, will dig load, admit for further care. awaiting ep consult.

## 2019-07-31 ENCOUNTER — TRANSCRIPTION ENCOUNTER (OUTPATIENT)
Age: 79
End: 2019-07-31

## 2019-07-31 LAB
ALBUMIN SERPL ELPH-MCNC: 3.6 G/DL — SIGNIFICANT CHANGE UP (ref 3.3–5)
ALP SERPL-CCNC: 51 U/L — SIGNIFICANT CHANGE UP (ref 40–120)
ALT FLD-CCNC: 25 U/L — SIGNIFICANT CHANGE UP (ref 10–45)
ANION GAP SERPL CALC-SCNC: 10 MMOL/L — SIGNIFICANT CHANGE UP (ref 5–17)
AST SERPL-CCNC: 23 U/L — SIGNIFICANT CHANGE UP (ref 10–40)
BILIRUB SERPL-MCNC: 0.6 MG/DL — SIGNIFICANT CHANGE UP (ref 0.2–1.2)
BUN SERPL-MCNC: 21 MG/DL — SIGNIFICANT CHANGE UP (ref 7–23)
CALCIUM SERPL-MCNC: 8.8 MG/DL — SIGNIFICANT CHANGE UP (ref 8.4–10.5)
CHLORIDE SERPL-SCNC: 110 MMOL/L — HIGH (ref 96–108)
CHOLEST SERPL-MCNC: 110 MG/DL — SIGNIFICANT CHANGE UP (ref 10–199)
CO2 SERPL-SCNC: 21 MMOL/L — LOW (ref 22–31)
CREAT SERPL-MCNC: 1.02 MG/DL — SIGNIFICANT CHANGE UP (ref 0.5–1.3)
GLUCOSE SERPL-MCNC: 92 MG/DL — SIGNIFICANT CHANGE UP (ref 70–99)
HBA1C BLD-MCNC: 5.6 % — SIGNIFICANT CHANGE UP (ref 4–5.6)
HCT VFR BLD CALC: 38.5 % — LOW (ref 39–50)
HDLC SERPL-MCNC: 33 MG/DL — LOW
HGB BLD-MCNC: 12.4 G/DL — LOW (ref 13–17)
LIPID PNL WITH DIRECT LDL SERPL: 65 MG/DL — SIGNIFICANT CHANGE UP
MAGNESIUM SERPL-MCNC: 1.9 MG/DL — SIGNIFICANT CHANGE UP (ref 1.6–2.6)
MCHC RBC-ENTMCNC: 32.2 GM/DL — SIGNIFICANT CHANGE UP (ref 32–36)
MCHC RBC-ENTMCNC: 32.5 PG — SIGNIFICANT CHANGE UP (ref 27–34)
MCV RBC AUTO: 101 FL — HIGH (ref 80–100)
NRBC # BLD: 0 /100 WBCS — SIGNIFICANT CHANGE UP (ref 0–0)
PLATELET # BLD AUTO: 146 K/UL — LOW (ref 150–400)
POTASSIUM SERPL-MCNC: 4.1 MMOL/L — SIGNIFICANT CHANGE UP (ref 3.5–5.3)
POTASSIUM SERPL-SCNC: 4.1 MMOL/L — SIGNIFICANT CHANGE UP (ref 3.5–5.3)
PROT SERPL-MCNC: 6.3 G/DL — SIGNIFICANT CHANGE UP (ref 6–8.3)
RBC # BLD: 3.81 M/UL — LOW (ref 4.2–5.8)
RBC # FLD: 14.3 % — SIGNIFICANT CHANGE UP (ref 10.3–14.5)
SODIUM SERPL-SCNC: 141 MMOL/L — SIGNIFICANT CHANGE UP (ref 135–145)
TOTAL CHOLESTEROL/HDL RATIO MEASUREMENT: 3.3 RATIO — LOW (ref 3.4–9.6)
TRIGL SERPL-MCNC: 61 MG/DL — SIGNIFICANT CHANGE UP (ref 10–149)
TSH SERPL-MCNC: 2.02 UIU/ML — SIGNIFICANT CHANGE UP (ref 0.35–4.94)
WBC # BLD: 5.18 K/UL — SIGNIFICANT CHANGE UP (ref 3.8–10.5)
WBC # FLD AUTO: 5.18 K/UL — SIGNIFICANT CHANGE UP (ref 3.8–10.5)

## 2019-07-31 PROCEDURE — 71045 X-RAY EXAM CHEST 1 VIEW: CPT | Mod: 26

## 2019-07-31 PROCEDURE — 99222 1ST HOSP IP/OBS MODERATE 55: CPT | Mod: 25

## 2019-07-31 PROCEDURE — 93010 ELECTROCARDIOGRAM REPORT: CPT

## 2019-07-31 RX ORDER — MAGNESIUM SULFATE 500 MG/ML
2 VIAL (ML) INJECTION ONCE
Refills: 0 | Status: COMPLETED | OUTPATIENT
Start: 2019-07-31 | End: 2019-07-31

## 2019-07-31 RX ORDER — DIGOXIN 250 MCG
0.25 TABLET ORAL ONCE
Refills: 0 | Status: COMPLETED | OUTPATIENT
Start: 2019-07-31 | End: 2019-07-31

## 2019-07-31 RX ORDER — METOPROLOL TARTRATE 50 MG
12.5 TABLET ORAL EVERY 12 HOURS
Refills: 0 | Status: DISCONTINUED | OUTPATIENT
Start: 2019-07-31 | End: 2019-07-31

## 2019-07-31 RX ORDER — DIGOXIN 250 MCG
0.12 TABLET ORAL DAILY
Refills: 0 | Status: DISCONTINUED | OUTPATIENT
Start: 2019-08-01 | End: 2019-08-02

## 2019-07-31 RX ORDER — METOPROLOL TARTRATE 50 MG
12.5 TABLET ORAL EVERY 12 HOURS
Refills: 0 | Status: DISCONTINUED | OUTPATIENT
Start: 2019-07-31 | End: 2019-08-02

## 2019-07-31 RX ADMIN — SACUBITRIL AND VALSARTAN 1 TABLET(S): 24; 26 TABLET, FILM COATED ORAL at 18:39

## 2019-07-31 RX ADMIN — Medication 0.25 MILLIGRAM(S): at 06:00

## 2019-07-31 RX ADMIN — Medication 20 MILLIGRAM(S): at 18:39

## 2019-07-31 RX ADMIN — Medication 20 MILLIGRAM(S): at 06:00

## 2019-07-31 RX ADMIN — Medication 0.25 MILLIGRAM(S): at 12:17

## 2019-07-31 RX ADMIN — Medication 12.5 MILLIGRAM(S): at 14:24

## 2019-07-31 RX ADMIN — Medication 50 GRAM(S): at 08:46

## 2019-07-31 RX ADMIN — APIXABAN 5 MILLIGRAM(S): 2.5 TABLET, FILM COATED ORAL at 10:17

## 2019-07-31 RX ADMIN — APIXABAN 5 MILLIGRAM(S): 2.5 TABLET, FILM COATED ORAL at 22:13

## 2019-07-31 NOTE — PROGRESS NOTE ADULT - PROBLEM SELECTOR PLAN 1
-s/p DCCV on 7/30- episode of TARI overnight that self converted and this AM now in Afib- rate controlled  - continuing digoxin load with 0.25mcg today- level tomorrow AM   -c/w Eliquis 5mg BID  - ambulate patient to assess HR/ monitor BP- consider adding metoprolol 12.5mg BID if TARI and BP can tolerate

## 2019-07-31 NOTE — DISCHARGE NOTE PROVIDER - NSDCFUSCHEDAPPT_GEN_ALL_CORE_FT
DALTON FLOWER ; 08/01/2019 ; [facility] [address]   DALTON FLOWER ; 08/07/2019 ; [facility] Pre[facility]mits DALTON FLOWER ; 08/01/2019 ; NPP Derm [address]   DALTON FLOWER ; 08/07/2019 ; [facility] Pre[facility]mits DALTON FLOWER ; 08/01/2019 ; NPP Derm [address]   DALTON FLOWER ; 08/07/2019 ; [facility] PreAdmits DALTON FLOWER ; 08/01/2019 ; NPP Derm [address]  DALTON FLOWER ; 08/06/2019 ; NPP Cardio Vasc [address]  DALTON FLOWER ; 08/07/2019 ; [facility] PreAdmits DALTON FLOWER ; 08/06/2019 ; NPP Cardio Vasc [address]  DALTON FLOWER ; 08/07/2019 ; [facility] PreAdmits DALTON FLOWER ; 08/06/2019 ; NPP Cardio Vasc [address]  DALTON FLOWER ; 08/07/2019 ; [facility] Pre[facility]mits DALTON FLOWER ; 08/06/2019 ; NPP Cardio Vasc [address]  DALTON FLOWER ; 08/07/2019 ; [facility] PreAdmits  DALTON FLOWER ; 09/19/2019 ; [facility] [address] DALTON FLOWER ; 08/06/2019 ; NPP Cardio Vasc [address]  DALTON FLOWER ; 08/07/2019 ; [facility] PreAdmits  DALTON FLOWER ; 09/19/2019 ; [facility] [address] DALTON FLOWER ; 08/06/2019 ; [facility] Cardio Vasc [address]  DALTON FLOWER ; 08/07/2019 ; [facility] PreAdmits  DALTON FLOWER ; 09/19/2019 ; [facility] [address]

## 2019-07-31 NOTE — DISCHARGE NOTE PROVIDER - NSDCCPCAREPLAN_GEN_ALL_CORE_FT
PRINCIPAL DISCHARGE DIAGNOSIS  Diagnosis: Afib  Assessment and Plan of Treatment: You were admitted for rapid Atrial fibrillation.  You were given digoxin to try to help convert you to normal sinus rhythm.  You underwent cardioversion for atrial fibrillation.  It was successful at first, however you went back into Atrial fibrillation, this time rate controlled.    You were restarted on metoprolol 12.5mg twice a day and your blood pressure was stable. PRINCIPAL DISCHARGE DIAGNOSIS  Diagnosis: Afib  Assessment and Plan of Treatment: You were admitted for rapid Atrial fibrillation.  You were given digoxin to help control your heart rate.  You underwent cardioversion for atrial fibrillation.  It was successful at first, however you went back into Atrial fibrillation, this time rate controlled.    Entresto was stopped due to low blood pressure. You were restarted on metoprolol 12.5mg twice a day and your blood pressure was stable. PRINCIPAL DISCHARGE DIAGNOSIS  Diagnosis: Afib  Assessment and Plan of Treatment: You were admitted for rapid Atrial fibrillation.  You were given Digoxin to help control your heart rate.  You underwent cardioversion for atrial fibrillation.  It was successful at first, however you went back into Atrial fibrillation, this time rate controlled.   You were restarted on metoprolol 12.5mg twice a day. In order to maintain the sinus rhythm, you were also started on antiarrhythmic medication Amiodarone 400mg twice a day, for which you should take for 5 more days. Then starting 8/7/19 decrease to 200mg once a day. Due to low blood pressures, your medication Entresto was HELD. Continue taking blood thinner medication Eliquis without missing doses for at least one month.      SECONDARY DISCHARGE DIAGNOSES  Diagnosis: Chronic systolic heart failure  Assessment and Plan of Treatment: Call Dr Ortiz' office next week on Tuesday to confirm whether you will be undergoing the defibrillator implant on Wednesday.

## 2019-07-31 NOTE — PROGRESS NOTE ADULT - PROBLEM SELECTOR PLAN 4
F: No IVF  N: DASH/TLC diet. NPO pending DCCV  E: Replete lytes PRN K<4, Mg<2  P: DVT PPX: on Eliquis  C: FULL CODE  Dispo: Admit to tele 5 [facility]- monitor HR/ BP today, possible initiation of metoprolol.  dc likely tomorrow or Friday

## 2019-07-31 NOTE — DISCHARGE NOTE PROVIDER - HOSPITAL COURSE
Kevin romero is an 78 yo M with hx of chronic systolic CHF (EF 20-25% on echo 4/2019), Afib s/p DCCV 2014 (on eliqu), AVRt/MVRt at [facility] in 12/2014, TURP, ROHAN on CPAP, lyme disease s/p Abx, presented from cardiologists Dr Zuñiga's office for Afib RVR 130s.   He recently saw Dr Zuñiga in office for c/o lightheadedness, hypotension and metoprolol was DC'd.  On 7/30 he felt lightheaded, SOB on exertion, and decreased exercise tolerance < 1 block.  He was found to be in rapid Afib with HR in 140s in the office and was sent to the ED.  In the ED he was found to have HR 130s, BP 80-130s, trop negative and BNP 6630.  CXR with mild pulmonary vascular congestion.  He received 500 NS bolus and was given IV metoprolol and IV digoxin. He was admitted to [address] [place] [address].  On 7/30 he underwent DCCV successfully.  However that night went into TARI- self resolved. Kevin romero is an 80 yo M with hx of chronic systolic CHF (EF 20-25% on echo 4/2019), [facility]t at [facility] in 12/2014, TURP, ROHAN on CPAP, lyme disease s/p Abx, presented from cardiologists Dr Zuñiga's office for Afib RVR 130s.   He recently saw Dr Zuñiga in office for c/o lightheadedness, hypotension and metoprolol was DC'd.  On 7/30 he felt lightheaded, SOB on exertion, and decreased exercise tolerance < 1 block.  He was found to be in rapid Afib with HR in 140s in the office and was sent to the ED.  In the ED he was found to have HR 130s, BP 80-130s, trop negative and BNP 6630.  CXR with mild pulmonary vascular congestion.  He received 500 NS bolus and was given IV metoprolol and IV digoxin. He was admitted to [address] [place] [address].  On 7/30 he underwent DCCV successfully.  However that night went into TARI- self resolved to NSR then back into Afib- this time rate controlled.   He remains in 80-90s, Afib, occasionally HR up to 130s/140s, however this is very brief and patient asymptomatic.  Metoprolol 12.5mg BID was restarted and BP tolerating. Kevin romero is an 80 yo M with hx of chronic systolic CHF (EF 20-25% on echo 4/2019), [facility] at [facility] in 12/2014, TURP, ROHAN on CPAP, lyme disease s/p Abx, presented from cardiologists Dr Zuñiga's office for Afib RVR 130s.   He recently saw Dr Zuñiga in office for c/o lightheadedness, hypotension and metoprolol was DC'd.  On 7/30 he felt lightheaded, SOB on exertion, and decreased exercise tolerance < 1 block.  He was found to be in rapid Afib with HR in 140s in the office and was sent to the ED.  In the ED he was found to have HR 130s, BP 80-130s, trop negative and BNP 6630.  Patient presented with acute on chronic heart failure due. CXR with mild pulmonary vascular congestion.  He received 500 NS bolus and was given IV metoprolol and IV digoxin. He was admitted to [address] [place] [address].  On 7/30 he underwent DCCV successfully.  However that night went into TARI- self resolved to NSR then back into Afib- this time rate controlled.   He remains in 80-90s, Afib, occasionally HR up to 130s/140s, however this is very brief and patient asymptomatic.  Metoprolol 12.5mg BID was restarted and BP tolerating. Kevin romero is an 78 yo M with hx of chronic systolic CHF (EF 20-25% on echo 4/2019), Afib s/p DCCV 2014 (on eliquis), AVRt/MVRt at [facility] in 12/2014, TURP, ROHAN on CPAP, lyme disease s/p Abx, presented from cardiologists Dr Zuñiga's office for Afib RVR 130s.   He recently saw Dr Zuñiga in office for c/o lightheadedness, hypotension and metoprolol was DC'd.  On 7/30 he felt lightheaded, SOB on exertion, and decreased exercise tolerance < 1 block.  He was found to be in rapid Afib with HR in 140s in the office and was sent to the ED.  In the ED he was found to have HR 130s, BP 80-130s, trop negative and BNP 6630.  Patient presented with acute on chronic systolic heart failure likely 2/2 atrial fibrillation. CXR with mild pulmonary vascular congestion.  He received 500 NS bolus and was given IV metoprolol and IV digoxin. He was admitted to [address] [place] [address].  On 7/30 he underwent DCCV successfully.  However that night went into TARI- self resolved to NSR then back into Afib- this time rate controlled.   He remains in 80-90s, Afib, occasionally HR up to 130s/140s, however this is very brief and patient asymptomatic.  Metoprolol 12.5mg BID was restarted and BP tolerating. Kevin romero is an 78 yo M with hx of chronic systolic CHF (EF 20-25% on echo 4/2019), [facility] at [facility] in 12/2014, TURP, ROHAN on CPAP, lyme disease s/p Abx, presented from cardiologists Dr Zuñiga's office for Afib RVR 130s.   He recently saw Dr Zuñiga in office for c/o lightheadedness, hypotension and metoprolol was DC'd.  On 7/30 he felt lightheaded, SOB on exertion, and decreased exercise tolerance < 1 block.  He was found to be in rapid Afib with HR in 140s in the office and was sent to the ED.  In the ED he was found to have HR 130s, BP 80-130s/50s, trop negative and BNP 6630.  Admitted to [address] for rapid atrial fibrillation management. CXR with mild pulmonary vascular congestion.  He received 500 NS bolus and was given IV metoprolol and IV digoxin. On 7/30/19 he underwent DCCV successfully.  However that night went into TARI - self resolved to NSR then back into Afib- this time rate controlled.   HR remains in 80-90s, Afib, occasionally HR up to 130s/140s, however this is very brief and patient asymptomatic.  Metoprolol 12.5mg BID was restarted, and started Amiodarone load 400mg BID per EP recs, and pt self-converted to sinus rhythm w/ frequent PACs. On the day of discharge, the patient was seen and examined. Symptoms improved. Vital signs are stable. Labs and imaging reviewed. Patient is medically optimized and hemodynamically stable. Return precautions discussed, medication teach back done, and importance of physician followup emphasized. The patient verbalized understanding.

## 2019-07-31 NOTE — PROGRESS NOTE ADULT - SUBJECTIVE AND OBJECTIVE BOX
Interval events: s/p DCCV yesterday.  This AM back in Afib, however rate in 80-90s, BP 90-110s, and patient asymptomatic.    Denies SOB, palpitations, dizziness, chest pain.             Objective:  Appearance: Normal	  HEENT:   Normal oral mucosa, PERRL, EOMI	  Neck: Supple, - JVD  Cardiovascular: irregularly irregular  Respiratory: bibasilar crackles, no wheezing  Gastrointestinal:  Soft, Non-tender, + BS	  Extremities: BLE warm, dry, trace BLE edema                          12.4   5.18  )-----------( 146      ( 31 Jul 2019 07:34 )             38.5       07-31    141  |  110<H>  |  21  ----------------------------<  92  4.1   |  21<L>  |  1.02    Ca    8.8      31 Jul 2019 07:34  Mg     1.9     07-31    TPro  6.3  /  Alb  3.6  /  TBili  0.6  /  DBili  x   /  AST  23  /  ALT  25  /  AlkPhos  51  07-31          CARDIAC MARKERS ( 30 Jul 2019 10:26 )  x     / 0.01 ng/mL / 145 U/L / x     / 4.6 ng/mL        I&O's Summary    30 Jul 2019 07:01  -  31 Jul 2019 07:00  --------------------------------------------------------  IN: 0 mL / OUT: 550 mL / NET: -550 mL    31 Jul 2019 07:01  -  31 Jul 2019 11:20  --------------------------------------------------------  IN: 168 mL / OUT: 0 mL / NET: 168 mL        Vital Signs Last 24 Hrs  T(C): 36.4 (31 Jul 2019 09:24), Max: 36.7 (30 Jul 2019 22:10)  T(F): 97.5 (31 Jul 2019 09:24), Max: 98 (30 Jul 2019 22:10)  HR: 92 (31 Jul 2019 08:20) (84 - 132)  BP: 90/55 (31 Jul 2019 08:20) (81/58 - 119/62)  BP(mean): 77 (31 Jul 2019 08:20) (53 - 90)  RR: 16 (31 Jul 2019 08:20) (15 - 18)  SpO2: 97% (31 Jul 2019 08:20) (96% - 98%)

## 2019-07-31 NOTE — DISCHARGE NOTE PROVIDER - CARE PROVIDER_API CALL
Bowen Zuñiga)  Cardiovascular Disease; Internal Medicine  [address], [address]  [place] [address]  Phone: [phone]  Fax: [phone]  Follow Up Time: 1 week    Salo Ortiz)  Cardiac Electrophysiology; Cardiovascular Disease  [address] [place] [address]  Phone: [phone]  Fax: [phone]  Follow Up Time: 1 week

## 2019-07-31 NOTE — PROGRESS NOTE ADULT - PROBLEM SELECTOR PLAN 2
EF 20-25% on echo 4/2019. Chronic 3 pillow orthopnea unchanged.   -Appears mildly overloaded on exam. Possibly 2/2 rapid Afib  -continue PO lasix 20mg BID  -Follows w/ EP Dr Ortiz, was scheduled for outpatient AICD implantation next month in setting of low EF 20-25%

## 2019-07-31 NOTE — PROGRESS NOTE ADULT - PROBLEM SELECTOR PLAN 3
Hx bioprosthetic AVR/MVR in [facility] 12/2014.   -Monitor  -Echo in 4/2019 w/ EF 20-25%, global LV hypokinesis, mild conc LVH, severe reversible diastolic dysfunciton (st III), mild-mod TR, Aortic root 4.1cm, ascending aorta 4.8cm, bioprosthetic mitral valve replacement with minimal MR, bioprosthetic aortic valve replacement with no AR, EF 20-25%, severe reversible diastolic dysfunction (stage III).

## 2019-07-31 NOTE — DISCHARGE NOTE PROVIDER - NSDCQMCOGNITION_NEU_ALL_CORE
Quality 431: Preventive Care And Screening: Unhealthy Alcohol Use - Screening: Patient screened for unhealthy alcohol use using a single question and scores less than 2 times per year Quality 131: Pain Assessment And Follow-Up: Pain assessment using a standardized tool is documented as negative, no follow-up plan required Quality 110: Preventive Care And Screening: Influenza Immunization: Influenza Immunization Administered during Influenza season Quality 154 Part A: Falls: Risk Assessment (Should Be Reported With Measure 155.): Falls risk assessment completed and documented in the past 12 months. Quality 111:Pneumonia Vaccination Status For Older Adults: Pneumococcal Vaccination Previously Received Quality 226: Preventive Care And Screening: Tobacco Use: Screening And Cessation Intervention: Patient screened for tobacco and never smoked Detail Level: Detailed Quality 154 Part B: Falls: Risk Screening (Should Be Reported With Measure 155.): No documentation of falls status Quality 155 (Denominator): Falls Plan Of Care: Plan of Care not Documented, Reason not Otherwise Specified No difficulties

## 2019-07-31 NOTE — PROGRESS NOTE ADULT - ASSESSMENT
80 y/o M w/ PMHx of chronic systolic CHF (EF 20-25% on echo 4/2019), Afib s/p DCCV 2014 (on Eliquis), [facility] 12/[facility], TURP, ROHAN on CPAP, Lyme disease s/p Abx, presents sent from Cardiologist Dr Zuñiga's office for Afib RVR 130s.  Admitted to tele 5 Lachman for Afib RVR management.

## 2019-07-31 NOTE — DISCHARGE NOTE PROVIDER - CARE PROVIDERS DIRECT ADDRESSES
[email or web address]

## 2019-07-31 NOTE — DISCHARGE NOTE PROVIDER - PROVIDER TOKENS
PROVIDER:[TOKEN:[[id number]],FOLLOWUP:[1 week]],PROVIDER:[TOKEN:[[id number]],FOLLOWUP:[1 week]]

## 2019-08-01 ENCOUNTER — APPOINTMENT (OUTPATIENT)
Dept: DERMATOLOGY | Facility: CLINIC | Age: 79
End: 2019-08-01

## 2019-08-01 PROBLEM — I50.9 HEART FAILURE, UNSPECIFIED: Chronic | Status: ACTIVE | Noted: 2019-07-30

## 2019-08-01 PROBLEM — I48.91 UNSPECIFIED ATRIAL FIBRILLATION: Chronic | Status: ACTIVE | Noted: 2019-07-30

## 2019-08-01 LAB
ALBUMIN SERPL ELPH-MCNC: 3.6 G/DL — SIGNIFICANT CHANGE UP (ref 3.3–5)
ALP SERPL-CCNC: 49 U/L — SIGNIFICANT CHANGE UP (ref 40–120)
ALT FLD-CCNC: 19 U/L — SIGNIFICANT CHANGE UP (ref 10–45)
ANION GAP SERPL CALC-SCNC: 11 MMOL/L — SIGNIFICANT CHANGE UP (ref 5–17)
AST SERPL-CCNC: 17 U/L — SIGNIFICANT CHANGE UP (ref 10–40)
BILIRUB SERPL-MCNC: 0.7 MG/DL — SIGNIFICANT CHANGE UP (ref 0.2–1.2)
BUN SERPL-MCNC: 18 MG/DL — SIGNIFICANT CHANGE UP (ref 7–23)
CALCIUM SERPL-MCNC: 8.5 MG/DL — SIGNIFICANT CHANGE UP (ref 8.4–10.5)
CHLORIDE SERPL-SCNC: 110 MMOL/L — HIGH (ref 96–108)
CO2 SERPL-SCNC: 22 MMOL/L — SIGNIFICANT CHANGE UP (ref 22–31)
CREAT SERPL-MCNC: 0.91 MG/DL — SIGNIFICANT CHANGE UP (ref 0.5–1.3)
DIGOXIN SERPL-MCNC: 1.7 NG/ML — SIGNIFICANT CHANGE UP (ref 0.8–2)
GLUCOSE SERPL-MCNC: 91 MG/DL — SIGNIFICANT CHANGE UP (ref 70–99)
HCT VFR BLD CALC: 38.3 % — LOW (ref 39–50)
HGB BLD-MCNC: 12.3 G/DL — LOW (ref 13–17)
MAGNESIUM SERPL-MCNC: 2 MG/DL — SIGNIFICANT CHANGE UP (ref 1.6–2.6)
MCHC RBC-ENTMCNC: 32.1 GM/DL — SIGNIFICANT CHANGE UP (ref 32–36)
MCHC RBC-ENTMCNC: 32.1 PG — SIGNIFICANT CHANGE UP (ref 27–34)
MCV RBC AUTO: 100 FL — SIGNIFICANT CHANGE UP (ref 80–100)
NRBC # BLD: 0 /100 WBCS — SIGNIFICANT CHANGE UP (ref 0–0)
PLATELET # BLD AUTO: 144 K/UL — LOW (ref 150–400)
POTASSIUM SERPL-MCNC: 4.2 MMOL/L — SIGNIFICANT CHANGE UP (ref 3.5–5.3)
POTASSIUM SERPL-SCNC: 4.2 MMOL/L — SIGNIFICANT CHANGE UP (ref 3.5–5.3)
PROT SERPL-MCNC: 6.1 G/DL — SIGNIFICANT CHANGE UP (ref 6–8.3)
RBC # BLD: 3.83 M/UL — LOW (ref 4.2–5.8)
RBC # FLD: 14 % — SIGNIFICANT CHANGE UP (ref 10.3–14.5)
SODIUM SERPL-SCNC: 143 MMOL/L — SIGNIFICANT CHANGE UP (ref 135–145)
WBC # BLD: 5.67 K/UL — SIGNIFICANT CHANGE UP (ref 3.8–10.5)
WBC # FLD AUTO: 5.67 K/UL — SIGNIFICANT CHANGE UP (ref 3.8–10.5)

## 2019-08-01 PROCEDURE — 99233 SBSQ HOSP IP/OBS HIGH 50: CPT

## 2019-08-01 RX ORDER — SACUBITRIL AND VALSARTAN 24; 26 MG/1; MG/1
1 TABLET, FILM COATED ORAL
Qty: 0 | Refills: 0 | DISCHARGE

## 2019-08-01 RX ORDER — ACETAMINOPHEN 500 MG
650 TABLET ORAL ONCE
Refills: 0 | Status: COMPLETED | OUTPATIENT
Start: 2019-08-01 | End: 2019-08-01

## 2019-08-01 RX ORDER — METOPROLOL TARTRATE 50 MG
0.5 TABLET ORAL
Qty: 30 | Refills: 1
Start: 2019-08-01 | End: 2019-09-29

## 2019-08-01 RX ORDER — AMIODARONE HYDROCHLORIDE 400 MG/1
400 TABLET ORAL EVERY 12 HOURS
Refills: 0 | Status: DISCONTINUED | OUTPATIENT
Start: 2019-08-01 | End: 2019-08-02

## 2019-08-01 RX ORDER — DIGOXIN 250 MCG
1 TABLET ORAL
Qty: 30 | Refills: 1
Start: 2019-08-01

## 2019-08-01 RX ADMIN — Medication 20 MILLIGRAM(S): at 05:41

## 2019-08-01 RX ADMIN — Medication 650 MILLIGRAM(S): at 05:39

## 2019-08-01 RX ADMIN — AMIODARONE HYDROCHLORIDE 400 MILLIGRAM(S): 400 TABLET ORAL at 16:10

## 2019-08-01 RX ADMIN — Medication 12.5 MILLIGRAM(S): at 19:16

## 2019-08-01 RX ADMIN — APIXABAN 5 MILLIGRAM(S): 2.5 TABLET, FILM COATED ORAL at 19:16

## 2019-08-01 RX ADMIN — Medication 12.5 MILLIGRAM(S): at 05:40

## 2019-08-01 RX ADMIN — APIXABAN 5 MILLIGRAM(S): 2.5 TABLET, FILM COATED ORAL at 07:04

## 2019-08-01 RX ADMIN — Medication 0.12 MILLIGRAM(S): at 05:40

## 2019-08-01 RX ADMIN — Medication 650 MILLIGRAM(S): at 06:49

## 2019-08-01 RX ADMIN — Medication 20 MILLIGRAM(S): at 19:16

## 2019-08-01 NOTE — PROGRESS NOTE ADULT - PROBLEM SELECTOR PLAN 4
F: No IVF  N: DASH/TLC diet. NPO pending DCCV  E: Replete lytes PRN K<4, Mg<2  P: DVT PPX: on Eliquis  C: FULL CODE  Dispo: 5Lach, start amiodarone load.  if stable dc home tomorrow

## 2019-08-01 NOTE — PROGRESS NOTE ADULT - PROBLEM SELECTOR PLAN 3
Hx bioprosthetic AVR/MVR in [facility] 12/2014.   -Monitor  -Echo in 4/2019 w/ EF 20-25%, global LV hypokinesis, mild conc LVH, severe reversible diastolic dysfunciton (st III), mild-mod TR, Aortic root 4.1cm, ascending aorta 4.8cm, bioprosthetic mitral valve replacement with minimal MR, bioprosthetic aortic valve replacement with no AR, EF 20-25%, severe reversible diastolic dysfunction (stage III).

## 2019-08-01 NOTE — PROGRESS NOTE ADULT - PROBLEM SELECTOR PLAN 1
-s/p DCCV on 7/30- episode of TARI overnight that self converted and this AM now in Afib- rate controlled  - continue digoxin 125mcg daily  -c/w Eliquis 5mg BID  - continue metoprolol 12.5mg BID  - start amiodarone 400mg BID

## 2019-08-01 NOTE — PROGRESS NOTE ADULT - SUBJECTIVE AND OBJECTIVE BOX
Interval events: yesterday HR in and out of Afib, rate mainly 70-90s, but occasionally for brief periods going up to 130s (asymptomatic)- metoprolol 12.5mg BID started.  Blood pressure tolerated, however entresto stopped d/t bp in 90s-100.  Today HR continued to vary between afib, rate controlled and NSR.   Discussed with EP and decided to start amiodarone load in hospital.             Objective:  Appearance: Normal	  HEENT:   Normal oral mucosa, PERRL, EOMI	  Neck: Supple, - JVD  Cardiovascular: irregularly irregular  Respiratory: bibasilar crackles, no wheezing  Gastrointestinal:  Soft, Non-tender, + BS	  Extremities: BLE warm, dry, trace BLE edema           Vital Signs Last 24 Hrs  T(C): 36.6 (01 Aug 2019 14:13), Max: 37.2 (01 Aug 2019 10:00)  T(F): 97.8 (01 Aug 2019 14:13), Max: 98.9 (01 Aug 2019 10:00)  HR: 66 (01 Aug 2019 13:49) (66 - 92)  BP: 111/56 (01 Aug 2019 13:49) (91/68 - 121/75)  BP(mean): 77 (01 Aug 2019 13:49) (62 - 95)  RR: 18 (01 Aug 2019 13:49) (16 - 18)  SpO2: 97% (01 Aug 2019 13:49) (92% - 97%)      I&O's Summary    31 Jul 2019 07:01  -  01 Aug 2019 07:00  --------------------------------------------------------  IN: 708 mL / OUT: 1130 mL / NET: -422 mL    01 Aug 2019 07:01  -  01 Aug 2019 15:30  --------------------------------------------------------  IN: 360 mL / OUT: 0 mL / NET: 360 mL                          12.3   5.67  )-----------( 144      ( 01 Aug 2019 07:08 )             38.3       08-01    143  |  110<H>  |  18  ----------------------------<  91  4.2   |  22  |  0.91    Ca    8.5      01 Aug 2019 07:08  Mg     2.0     08-01    TPro  6.1  /  Alb  3.6  /  TBili  0.7  /  DBili  x   /  AST  17  /  ALT  19  /  AlkPhos  49  08-01

## 2019-08-01 NOTE — PROVIDER CONTACT NOTE (CHANGE IN STATUS NOTIFICATION) - ASSESSMENT
Pt alert and oriented x 4, pt states he felt "uncomfortable" during the episode but felt better after getting 2L/NC and sitting with the HOB elevated. BP still soft around 90/60's.

## 2019-08-01 NOTE — PROGRESS NOTE ADULT - ASSESSMENT
78 y/o M w/ PMHx of chronic systolic CHF (EF 20-25% on echo 4/2019), Afib s/p DCCV 2014 (on Eliquis), bioprosthetic AVR/MVR at [facility] 12/2014, TURP, ROHAN on CPAP, Lyme disease s/p Abx, presents sent from Cardiologist Dr Zuñiga's office for Afib RVR 130s.  Admitted to tele 5 Lachman for Afib RVR management.

## 2019-08-02 ENCOUNTER — TRANSCRIPTION ENCOUNTER (OUTPATIENT)
Age: 79
End: 2019-08-02

## 2019-08-02 VITALS
OXYGEN SATURATION: 95 % | SYSTOLIC BLOOD PRESSURE: 91 MMHG | RESPIRATION RATE: 16 BRPM | DIASTOLIC BLOOD PRESSURE: 53 MMHG | HEART RATE: 68 BPM

## 2019-08-02 LAB
ANION GAP SERPL CALC-SCNC: 9 MMOL/L — SIGNIFICANT CHANGE UP (ref 5–17)
BUN SERPL-MCNC: 16 MG/DL — SIGNIFICANT CHANGE UP (ref 7–23)
CALCIUM SERPL-MCNC: 8.5 MG/DL — SIGNIFICANT CHANGE UP (ref 8.4–10.5)
CHLORIDE SERPL-SCNC: 105 MMOL/L — SIGNIFICANT CHANGE UP (ref 96–108)
CO2 SERPL-SCNC: 24 MMOL/L — SIGNIFICANT CHANGE UP (ref 22–31)
CREAT SERPL-MCNC: 0.83 MG/DL — SIGNIFICANT CHANGE UP (ref 0.5–1.3)
GLUCOSE SERPL-MCNC: 98 MG/DL — SIGNIFICANT CHANGE UP (ref 70–99)
MAGNESIUM SERPL-MCNC: 2 MG/DL — SIGNIFICANT CHANGE UP (ref 1.6–2.6)
POTASSIUM SERPL-MCNC: 4.2 MMOL/L — SIGNIFICANT CHANGE UP (ref 3.5–5.3)
POTASSIUM SERPL-SCNC: 4.2 MMOL/L — SIGNIFICANT CHANGE UP (ref 3.5–5.3)
SODIUM SERPL-SCNC: 138 MMOL/L — SIGNIFICANT CHANGE UP (ref 135–145)

## 2019-08-02 PROCEDURE — 93010 ELECTROCARDIOGRAM REPORT: CPT

## 2019-08-02 RX ORDER — ACETAMINOPHEN 500 MG
650 TABLET ORAL ONCE
Refills: 0 | Status: COMPLETED | OUTPATIENT
Start: 2019-08-02 | End: 2019-08-02

## 2019-08-02 RX ORDER — AMIODARONE HYDROCHLORIDE 400 MG/1
2 TABLET ORAL
Qty: 41 | Refills: 0
Start: 2019-08-02 | End: 2019-08-31

## 2019-08-02 RX ORDER — AMIODARONE HYDROCHLORIDE 400 MG/1
1 TABLET ORAL
Qty: 0 | Refills: 0 | DISCHARGE
Start: 2019-08-02 | End: 2019-08-31

## 2019-08-02 RX ADMIN — Medication 650 MILLIGRAM(S): at 06:44

## 2019-08-02 RX ADMIN — Medication 0.12 MILLIGRAM(S): at 06:10

## 2019-08-02 RX ADMIN — Medication 12.5 MILLIGRAM(S): at 06:10

## 2019-08-02 RX ADMIN — AMIODARONE HYDROCHLORIDE 400 MILLIGRAM(S): 400 TABLET ORAL at 06:10

## 2019-08-02 RX ADMIN — APIXABAN 5 MILLIGRAM(S): 2.5 TABLET, FILM COATED ORAL at 06:11

## 2019-08-02 RX ADMIN — Medication 20 MILLIGRAM(S): at 06:10

## 2019-08-02 NOTE — DISCHARGE NOTE NURSING/CASE MANAGEMENT/SOCIAL WORK - NSDCPEPT PROEDHF_GEN_ALL_CORE
Report signs and symptoms to primary care provider/Call primary care provider for follow up after discharge/Low salt diet/Activities as tolerated/Monitor weight daily

## 2019-08-02 NOTE — DISCHARGE NOTE NURSING/CASE MANAGEMENT/SOCIAL WORK - NSDCDPATPORTLINK_GEN_ALL_CORE
You can access the [facility] Patient Portal, offered by [facility], by registering with the following website: [email or web address]

## 2019-08-06 ENCOUNTER — APPOINTMENT (OUTPATIENT)
Dept: HEART AND VASCULAR | Facility: CLINIC | Age: 79
End: 2019-08-06
Payer: MEDICARE

## 2019-08-06 ENCOUNTER — NON-APPOINTMENT (OUTPATIENT)
Age: 79
End: 2019-08-06

## 2019-08-06 VITALS
HEIGHT: 75 IN | DIASTOLIC BLOOD PRESSURE: 72 MMHG | SYSTOLIC BLOOD PRESSURE: 112 MMHG | BODY MASS INDEX: 28.6 KG/M2 | WEIGHT: 230 LBS | HEART RATE: 66 BPM

## 2019-08-06 PROCEDURE — 99213 OFFICE O/P EST LOW 20 MIN: CPT | Mod: 25

## 2019-08-06 PROCEDURE — 93000 ELECTROCARDIOGRAM COMPLETE: CPT

## 2019-08-06 RX ORDER — SACUBITRIL AND VALSARTAN 24; 26 MG/1; MG/1
24-26 TABLET, FILM COATED ORAL TWICE DAILY
Qty: 1 | Refills: 2 | Status: DISCONTINUED | COMMUNITY
Start: 2019-02-27 | End: 2019-08-06

## 2019-08-06 NOTE — DISCUSSION/SUMMARY
[FreeTextEntry1] : EKG:NSR,IVCD\par will send for dig level and BNP\par scheduled for ICD in 2 days\par cont dig + Lasix for ICM/CHF\par Eliquis + amiodarone for AF\par will try and restart entresto post ICD\par f/u dr rosales for aortic aneurysm

## 2019-08-06 NOTE — HISTORY OF PRESENT ILLNESS
[FreeTextEntry1] : sob better\par had paf and put on amio + dig- just took dig . entresto stopped due to hypotension

## 2019-08-06 NOTE — PHYSICAL EXAM
[General Appearance - Well Developed] : well developed [Normal Appearance] : normal appearance [Well Groomed] : well groomed [General Appearance - Well Nourished] : well nourished [No Deformities] : no deformities [Normal Conjunctiva] : the conjunctiva exhibited no abnormalities [General Appearance - In No Acute Distress] : no acute distress [Normal Oral Mucosa] : normal oral mucosa [] : no respiratory distress [Respiration, Rhythm And Depth] : normal respiratory rhythm and effort [Exaggerated Use Of Accessory Muscles For Inspiration] : no accessory muscle use [Auscultation Breath Sounds / Voice Sounds] : lungs were clear to auscultation bilaterally [Heart Sounds] : normal S1 and S2 [Heart Rate And Rhythm] : heart rate and rhythm were normal [Murmurs] : no murmurs present [Abdomen Soft] : soft [Abdomen Tenderness] : non-tender [Abnormal Walk] : normal gait [Nail Clubbing] : no clubbing of the fingernails [Skin Color & Pigmentation] : normal skin color and pigmentation [Oriented To Time, Place, And Person] : oriented to person, place, and time [FreeTextEntry1] : mild + JVD

## 2019-08-07 DIAGNOSIS — G47.33 OBSTRUCTIVE SLEEP APNEA (ADULT) (PEDIATRIC): ICD-10-CM

## 2019-08-07 DIAGNOSIS — I50.23 ACUTE ON CHRONIC SYSTOLIC (CONGESTIVE) HEART FAILURE: ICD-10-CM

## 2019-08-07 DIAGNOSIS — Z96.652 PRESENCE OF LEFT ARTIFICIAL KNEE JOINT: ICD-10-CM

## 2019-08-07 DIAGNOSIS — I48.91 UNSPECIFIED ATRIAL FIBRILLATION: ICD-10-CM

## 2019-08-07 DIAGNOSIS — I48.92 UNSPECIFIED ATRIAL FLUTTER: ICD-10-CM

## 2019-08-07 DIAGNOSIS — I49.1 ATRIAL PREMATURE DEPOLARIZATION: ICD-10-CM

## 2019-08-07 DIAGNOSIS — Z99.89 DEPENDENCE ON OTHER ENABLING MACHINES AND DEVICES: ICD-10-CM

## 2019-08-08 ENCOUNTER — INPATIENT (INPATIENT)
Facility: HOSPITAL | Age: 79
LOS: 0 days | Discharge: HOME | DRG: 227 | End: 2019-08-09
Attending: INTERNAL MEDICINE | Admitting: INTERNAL MEDICINE
Payer: COMMERCIAL

## 2019-08-08 VITALS
SYSTOLIC BLOOD PRESSURE: 130 MMHG | DIASTOLIC BLOOD PRESSURE: 80 MMHG | RESPIRATION RATE: 16 BRPM | OXYGEN SATURATION: 100 % | HEART RATE: 60 BPM

## 2019-08-08 DIAGNOSIS — I50.22 CHRONIC SYSTOLIC (CONGESTIVE) HEART FAILURE: ICD-10-CM

## 2019-08-08 DIAGNOSIS — Z95.2 PRESENCE OF PROSTHETIC HEART VALVE: Chronic | ICD-10-CM

## 2019-08-08 DIAGNOSIS — Z90.79 ACQUIRED ABSENCE OF OTHER GENITAL ORGAN(S): Chronic | ICD-10-CM

## 2019-08-08 DIAGNOSIS — Z98.890 OTHER SPECIFIED POSTPROCEDURAL STATES: Chronic | ICD-10-CM

## 2019-08-08 PROCEDURE — 93641 EP EVL 1/2CHMB PAC CVDFB TST: CPT | Mod: 26

## 2019-08-08 PROCEDURE — 33249 INSJ/RPLCMT DEFIB W/LEAD(S): CPT | Mod: Q0

## 2019-08-08 RX ORDER — APIXABAN 2.5 MG/1
5 TABLET, FILM COATED ORAL
Refills: 0 | Status: DISCONTINUED | OUTPATIENT
Start: 2019-08-08 | End: 2019-08-08

## 2019-08-08 RX ORDER — DIGOXIN 250 MCG
0.12 TABLET ORAL DAILY
Refills: 0 | Status: DISCONTINUED | OUTPATIENT
Start: 2019-08-08 | End: 2019-08-09

## 2019-08-08 RX ORDER — FUROSEMIDE 40 MG
20 TABLET ORAL DAILY
Refills: 0 | Status: DISCONTINUED | OUTPATIENT
Start: 2019-08-08 | End: 2019-08-09

## 2019-08-08 RX ORDER — CEFAZOLIN SODIUM 1 G
2000 VIAL (EA) INJECTION ONCE
Refills: 0 | Status: COMPLETED | OUTPATIENT
Start: 2019-08-08 | End: 2019-08-08

## 2019-08-08 RX ORDER — CEFAZOLIN SODIUM 1 G
2000 VIAL (EA) INJECTION ONCE
Refills: 0 | Status: DISCONTINUED | OUTPATIENT
Start: 2019-08-08 | End: 2019-08-08

## 2019-08-08 RX ORDER — AMIODARONE HYDROCHLORIDE 400 MG/1
200 TABLET ORAL DAILY
Refills: 0 | Status: DISCONTINUED | OUTPATIENT
Start: 2019-08-08 | End: 2019-08-09

## 2019-08-08 RX ORDER — ACETAMINOPHEN 500 MG
1000 TABLET ORAL ONCE
Refills: 0 | Status: COMPLETED | OUTPATIENT
Start: 2019-08-08 | End: 2019-08-08

## 2019-08-08 RX ORDER — METOPROLOL TARTRATE 50 MG
12.5 TABLET ORAL
Refills: 0 | Status: DISCONTINUED | OUTPATIENT
Start: 2019-08-08 | End: 2019-08-09

## 2019-08-08 RX ADMIN — Medication 2000 MILLIGRAM(S): at 21:13

## 2019-08-08 RX ADMIN — Medication 1000 MILLIGRAM(S): at 22:46

## 2019-08-08 RX ADMIN — Medication 12.5 MILLIGRAM(S): at 17:59

## 2019-08-08 RX ADMIN — Medication 2000 MILLIGRAM(S): at 13:05

## 2019-08-08 RX ADMIN — Medication 1000 MILLIGRAM(S): at 21:51

## 2019-08-08 NOTE — H&P ADULT - HISTORY OF PRESENT ILLNESS
78 y/o M with Afib s/p DCCV 2014 and last week (on Eliquis), bioprosthetic AVR/MVR at [facility] 12/2014, TURP, ROHAN on CPAP, Lyme disease and chronic systolic CHF (EF 20-25% on echo 4/2019) despite being on optimal heart failure medication and now presents for an elective ICD implant.     He was here last week with symptomatic afib with RVR s/p cardioversion.  Since then he has been doing well without complaints.  No chest pain, palpitations, SOB, syncope, near syncope, ALCANTAR. He presents for elective ICD    ECHO 4/2019 EF 20-25%, global LV hypokinesis, mild conc LVH, severe reversible diastolic dysfunciton (st III), mild-mod TR

## 2019-08-08 NOTE — H&P ADULT - NSHPOUTPATIENTPROVIDERS_GEN_ALL_CORE
Cardiologist: Dr. Zuñiga  EP: Dr. Ortiz  Pulm: Dr. Rice  CTS: [facility] (saw for enlarged Ascending Aorta 5/2019 - no intervention/did not meet criteria)

## 2019-08-08 NOTE — PROGRESS NOTE ADULT - SUBJECTIVE AND OBJECTIVE BOX
Brief Operative Note  [employer], MRI conditional, dual chamber implantable cardioverter defibrillator implant  Performed under monitored anesthesia care.  EBL 10 cc    Patient s/p successful implant of dual chamber implantable cardioverter defibrillator to the left chest wall via cephalic vein access x 2. Patient received procedural sedation by anesthesia staff.  Patient tolerated procedure well.    Plans:  Bedrest for 2 hours  Armrest overnight  PA and lateral CXR in the AM  Device interrogation in the AM  Continue Ancef for a minimum of one more dose  Resume all home medications

## 2019-08-08 NOTE — H&P ADULT - ASSESSMENT
78 y/o M with Afib s/p DCCV 2014 and last week (on Eliquis), bioprosthetic AVR/MVR at [facility] 12/2014, TURP, ROHAN on CPAP, Lyme disease and chronic systolic CHF (EF 20-25% on echo 4/2019) despite being on optimal heart failure medication and now presents for an elective ICD implant.

## 2019-08-09 ENCOUNTER — TRANSCRIPTION ENCOUNTER (OUTPATIENT)
Age: 79
End: 2019-08-09

## 2019-08-09 ENCOUNTER — RECORD ABSTRACTING (OUTPATIENT)
Age: 79
End: 2019-08-09

## 2019-08-09 VITALS — TEMPERATURE: 98 F

## 2019-08-09 PROCEDURE — 71046 X-RAY EXAM CHEST 2 VIEWS: CPT | Mod: 26

## 2019-08-09 RX ORDER — CEPHALEXIN 500 MG
500 CAPSULE ORAL
Qty: 6 | Refills: 0
Start: 2019-08-09 | End: 2019-08-11

## 2019-08-09 RX ORDER — CEPHALEXIN 500 MG
500 CAPSULE ORAL ONCE
Refills: 0 | Status: COMPLETED | OUTPATIENT
Start: 2019-08-09 | End: 2019-08-09

## 2019-08-09 RX ADMIN — Medication 12.5 MILLIGRAM(S): at 06:17

## 2019-08-09 RX ADMIN — Medication 0.12 MILLIGRAM(S): at 06:17

## 2019-08-09 RX ADMIN — Medication 20 MILLIGRAM(S): at 06:17

## 2019-08-09 RX ADMIN — AMIODARONE HYDROCHLORIDE 200 MILLIGRAM(S): 400 TABLET ORAL at 06:17

## 2019-08-09 RX ADMIN — Medication 500 MILLIGRAM(S): at 10:04

## 2019-08-09 NOTE — DISCHARGE NOTE PROVIDER - CARE PROVIDERS DIRECT ADDRESSES
[email or web address]

## 2019-08-09 NOTE — DISCHARGE NOTE PROVIDER - NSDCFUADDAPPT_GEN_ALL_CORE_FT
Follow up  9/10/19 @ 11:20 am  [phone]  Restart Eliquis tonight. Follow up  9/10/19 @ 11:20 am  [phone]  Restart Eliquis tonight.  Keflex 500 mg BID for 3 days.

## 2019-08-09 NOTE — DISCHARGE NOTE PROVIDER - HOSPITAL COURSE
80 y/o M with Afib s/p DCCV 2014 and last week (on Eliquis), bioprosthetic AVR/MVR at [facility] 12/2014, TURP, ROHAN on CPAP, Lyme disease and chronic systolic CHF (EF 20-25% on echo 4/2019) despite being on optimal heart failure medication and now presents for an elective ICD implant. 78 y/o M with Afib s/p DCCV 2014 and last week (on Eliquis), bioprosthetic AVR/MVR at [facility] 12/2014, TURP, ROHAN on CPAP, Lyme disease and chronic systolic CHF (EF 20-25% on echo 4/2019) despite being on optimal heart failure medication and now presents for an elective ICD implant.     Patient has successful implantation of dual chamber ICD, there were no complications, Post implant ICD check normal function. Site of implant no bleeding, no hematoma, no swelling.     Patient was stable for discharge.

## 2019-08-09 NOTE — DISCHARGE NOTE PROVIDER - CARE PROVIDER_API CALL
Salo Ortiz (MD)  Cardiac Electrophysiology; Cardiovascular Disease  [address] [place] [address]  Phone: [phone]  Fax: [phone]  Follow Up Time:

## 2019-08-09 NOTE — DISCHARGE NOTE NURSING/CASE MANAGEMENT/SOCIAL WORK - NSDCDPATPORTLINK_GEN_ALL_CORE
You can access the [facility] Patient Portal, offered by [facility], by registering with the following website: [email or web address]

## 2019-08-09 NOTE — DISCHARGE NOTE PROVIDER - NSDCFUSCHEDAPPT_GEN_ALL_CORE_FT
DALTON FLOWER ; 08/22/2019 ; [facility] Card[facility] Vasc [address]  DALTON FLOWER ; 09/19/2019 ; [facility] Derm [address] DALTON FLOWER ; 08/22/2019 ; [facility] Cardio Vasc [address]  DALTON FLOWER ; 09/10/2019 ; [facility] Cardio Vasc [address]  DALTON FLOWER ; 09/19/2019 ; [facility] Derm [address]  DALTON FLOWER ; 08/22/2019 ; [facility] Cardio Vasc [address]  DALTON FLOWER ; 09/10/2019 ; [facility] Cardio Vasc [address]  DALTON FLOWER ; 09/19/2019 ; [facility] Derm [address]

## 2019-08-09 NOTE — PROGRESS NOTE ADULT - SUBJECTIVE AND OBJECTIVE BOX
EPS Device interrogation    Indication: post implant    Device model: Fanbase Resonate 		Implanting Physician:     Functioning Mode: DDD			    Underlying Rhythm: AS/VS    Pacemaker dependency:  No    Battery status: 100% (ACE), 10 years  Interrogating parameters:   				RA			RV			LV    Sense:                                          3.9    mV                      23.6   mV    Threshold:                                       0.3 V @0.5              0.4 V@ 0.5 ms    Pacing Impedance:                                 598om                    497 om    Shock Impedance:                                                                    52 om    Events/Alert:  none    Parameter change: 	none    For ICD only:  tachy therapy – unchanged   Normal function ICD  site of implant dry/clean , no bleeding, no hematoma, no swelling   [ ]EPS attending: Interrogation reviewed. Agree with above.

## 2019-08-22 ENCOUNTER — NON-APPOINTMENT (OUTPATIENT)
Age: 79
End: 2019-08-22

## 2019-08-22 ENCOUNTER — APPOINTMENT (OUTPATIENT)
Dept: HEART AND VASCULAR | Facility: CLINIC | Age: 79
End: 2019-08-22
Payer: MEDICARE

## 2019-08-22 VITALS
HEART RATE: 68 BPM | DIASTOLIC BLOOD PRESSURE: 74 MMHG | WEIGHT: 215 LBS | BODY MASS INDEX: 26.73 KG/M2 | HEIGHT: 75 IN | OXYGEN SATURATION: 98 % | SYSTOLIC BLOOD PRESSURE: 110 MMHG

## 2019-08-22 DIAGNOSIS — G47.33 OBSTRUCTIVE SLEEP APNEA (ADULT) (PEDIATRIC): ICD-10-CM

## 2019-08-22 DIAGNOSIS — I48.91 UNSPECIFIED ATRIAL FIBRILLATION: ICD-10-CM

## 2019-08-22 DIAGNOSIS — I42.8 OTHER CARDIOMYOPATHIES: ICD-10-CM

## 2019-08-22 DIAGNOSIS — I50.22 CHRONIC SYSTOLIC (CONGESTIVE) HEART FAILURE: ICD-10-CM

## 2019-08-22 DIAGNOSIS — Z95.3 PRESENCE OF XENOGENIC HEART VALVE: ICD-10-CM

## 2019-08-22 DIAGNOSIS — Z88.1 ALLERGY STATUS TO OTHER ANTIBIOTIC AGENTS STATUS: ICD-10-CM

## 2019-08-22 DIAGNOSIS — Z90.79 ACQUIRED ABSENCE OF OTHER GENITAL ORGAN(S): ICD-10-CM

## 2019-08-22 DIAGNOSIS — I27.20 PULMONARY HYPERTENSION, UNSPECIFIED: ICD-10-CM

## 2019-08-22 DIAGNOSIS — Z96.652 PRESENCE OF LEFT ARTIFICIAL KNEE JOINT: ICD-10-CM

## 2019-08-22 DIAGNOSIS — Z79.01 LONG TERM (CURRENT) USE OF ANTICOAGULANTS: ICD-10-CM

## 2019-08-22 DIAGNOSIS — Z99.89 DEPENDENCE ON OTHER ENABLING MACHINES AND DEVICES: ICD-10-CM

## 2019-08-22 PROCEDURE — 93000 ELECTROCARDIOGRAM COMPLETE: CPT

## 2019-08-22 PROCEDURE — 99213 OFFICE O/P EST LOW 20 MIN: CPT | Mod: 25

## 2019-08-22 NOTE — PHYSICAL EXAM
[General Appearance - Well Developed] : well developed [Normal Appearance] : normal appearance [Well Groomed] : well groomed [General Appearance - Well Nourished] : well nourished [No Deformities] : no deformities [General Appearance - In No Acute Distress] : no acute distress [Normal Conjunctiva] : the conjunctiva exhibited no abnormalities [Respiration, Rhythm And Depth] : normal respiratory rhythm and effort [] : no respiratory distress [Exaggerated Use Of Accessory Muscles For Inspiration] : no accessory muscle use [Auscultation Breath Sounds / Voice Sounds] : lungs were clear to auscultation bilaterally [Heart Rate And Rhythm] : heart rate and rhythm were normal [Heart Sounds] : normal S1 and S2 [Murmurs] : no murmurs present [Edema] : no peripheral edema present [Abdomen Soft] : soft [Abdomen Tenderness] : non-tender [Abnormal Walk] : normal gait [Nail Clubbing] : no clubbing of the fingernails [Skin Color & Pigmentation] : normal skin color and pigmentation [Oriented To Time, Place, And Person] : oriented to person, place, and time [FreeTextEntry1] : mild + JVD

## 2019-08-22 NOTE — DISCUSSION/SUMMARY
[FreeTextEntry1] : EKG:atrial pacing,IVCD,PVC\par feel sob due to CHF/CMM will restart entresto slowly, check levels\par cont toprol +Lasix for CHF; amio+ Eliquis for AF

## 2019-08-23 LAB
ANION GAP SERPL CALC-SCNC: 14 MMOL/L
BUN SERPL-MCNC: 30 MG/DL
CALCIUM SERPL-MCNC: 9.5 MG/DL
CHLORIDE SERPL-SCNC: 106 MMOL/L
CO2 SERPL-SCNC: 23 MMOL/L
CREAT SERPL-MCNC: 1.32 MG/DL
GLUCOSE SERPL-MCNC: 94 MG/DL
POTASSIUM SERPL-SCNC: 4.3 MMOL/L
SODIUM SERPL-SCNC: 143 MMOL/L

## 2019-08-26 ENCOUNTER — RX RENEWAL (OUTPATIENT)
Age: 79
End: 2019-08-26

## 2019-09-05 ENCOUNTER — RX RENEWAL (OUTPATIENT)
Age: 79
End: 2019-09-05

## 2019-09-05 NOTE — REVIEW OF SYSTEMS
[Feeling Fatigued] : feeling fatigued [Recent Weight Loss (___ Lbs)] : recent [unfilled] ~Ulb weight loss [Loss Of Hearing] : hearing loss [see HPI] : see HPI [Cough] : cough [Wheezing] : wheezing [Joint Pain] : joint pain [Nocturia] : nocturia [Knee Problem] : knee problems [Tingling (Paresthesia)] : tingling [Memory Lapses Or Loss] : memory lapses or loss [Under Stress] : under stress [Easy Bruising] : a tendency for easy bruising [Negative] : Endocrine [Headache] : no headache [Fever] : no fever [Recent Weight Gain (___ Lbs)] : no recent weight gain [Chills] : no chills [Hematuria] : no hematuria [Coughing Up Blood] : no hemoptysis [Dizziness] : no dizziness [Tremor] : no tremor was seen [Numbness (Hypesthesia)] : no numbness [Convulsions] : no convulsions [Confusion] : no confusion was observed [Depression] : no depression [Suicidal] : not suicidal [Anxiety] : no anxiety [Easy Bleeding] : no tendency for easy bleeding

## 2019-09-05 NOTE — PHYSICAL EXAM
[General Appearance - Well Developed] : well developed [Well Groomed] : well groomed [Normal Appearance] : normal appearance [General Appearance - Well Nourished] : well nourished [No Deformities] : no deformities [General Appearance - In No Acute Distress] : no acute distress [Normal Conjunctiva] : the conjunctiva exhibited no abnormalities [Normal Oral Mucosa] : normal oral mucosa [Heart Sounds] : normal S1 and S2 [Heart Rate And Rhythm] : heart rate and rhythm were normal [] : no respiratory distress [Murmurs] : no murmurs present [Respiration, Rhythm And Depth] : normal respiratory rhythm and effort [Auscultation Breath Sounds / Voice Sounds] : lungs were clear to auscultation bilaterally [Exaggerated Use Of Accessory Muscles For Inspiration] : no accessory muscle use [Abdomen Tenderness] : non-tender [Bowel Sounds] : normal bowel sounds [Abdomen Soft] : soft [Abnormal Walk] : normal gait [Nail Clubbing] : no clubbing of the fingernails [Skin Color & Pigmentation] : normal skin color and pigmentation [Oriented To Time, Place, And Person] : oriented to person, place, and time [FreeTextEntry1] : ronny + HETALVD

## 2019-09-05 NOTE — HISTORY OF PRESENT ILLNESS
[FreeTextEntry1] : sees a Dr Lynch at [facility]- hx of AVR/MVR in 2014 after presenting with sob, cath minimal CAD- as per wife pt hasn't really regained his strength and gets OOB - has had Lyme disease and being treated for CFS.Uses CPAP for ROHAN, sleeps on 3 pillows,but no real orthopnea. Has been off Lasix. ALCANTAR is intermittent at times can walk more than others. No edema

## 2019-09-05 NOTE — DISCUSSION/SUMMARY
[FreeTextEntry1] : EKG:NSR,IVCD,PRWP\par ST/T wave abn. + ectopy\par echo:EF=25%, with ascending aortic enlargement - will get CT for aorta\par reviewed records from Dr Lynch( which were in [facility])\par until recently he denied tellez, he sings and is able to walk slowly up 5 flights of steps, recent echo revealed ef of 30%\par . had stress test(?ECHO STRESS) , exercised for 5 min on [facility] to [facility],He is only taking entresto once daily and lo dose Toprol( both decreased because of fatigue and concerns about hypotension)\par he was seen by EP who felt he is not a CRT candidate. he is taking finasteride for hair loss and not for prostate issues- as such feel can DC it to allow use of CHF drugs- will increase entresto now- and ? increase toprol, add Lasix /? change to Coreg, cont Eliquis for PAF. has bioprosthetic valve on echo\par feel his meds should be slowly increased agree with need for ICD for CM/chronic CHF

## 2019-09-09 ENCOUNTER — APPOINTMENT (OUTPATIENT)
Dept: HEART AND VASCULAR | Facility: CLINIC | Age: 79
End: 2019-09-09
Payer: MEDICARE

## 2019-09-09 ENCOUNTER — NON-APPOINTMENT (OUTPATIENT)
Age: 79
End: 2019-09-09

## 2019-09-09 VITALS
SYSTOLIC BLOOD PRESSURE: 120 MMHG | HEIGHT: 75 IN | HEART RATE: 109 BPM | WEIGHT: 220 LBS | BODY MASS INDEX: 27.35 KG/M2 | DIASTOLIC BLOOD PRESSURE: 62 MMHG

## 2019-09-09 PROCEDURE — 99213 OFFICE O/P EST LOW 20 MIN: CPT | Mod: 25

## 2019-09-09 PROCEDURE — 36415 COLL VENOUS BLD VENIPUNCTURE: CPT

## 2019-09-09 PROCEDURE — 93000 ELECTROCARDIOGRAM COMPLETE: CPT

## 2019-09-09 NOTE — DISCUSSION/SUMMARY
[FreeTextEntry1] : ekg: AF with RVR\par has mild CHF\par tolerating entresto, cont entresto + dig + and increase toprol for ICM/Eliquis + amio +toprol for AF\par check CMP/TFT/dig level\par Lasix + toprol+entresto for CHF\par may require increase in Lasix but wait to see if tolerates increase in toprol\par ICD f/u with EP tomorrow

## 2019-09-09 NOTE — PHYSICAL EXAM
[General Appearance - Well Developed] : well developed [Normal Appearance] : normal appearance [Well Groomed] : well groomed [General Appearance - Well Nourished] : well nourished [No Deformities] : no deformities [General Appearance - In No Acute Distress] : no acute distress [Normal Conjunctiva] : the conjunctiva exhibited no abnormalities [] : no respiratory distress [Respiration, Rhythm And Depth] : normal respiratory rhythm and effort [Exaggerated Use Of Accessory Muscles For Inspiration] : no accessory muscle use [Auscultation Breath Sounds / Voice Sounds] : lungs were clear to auscultation bilaterally [Murmurs] : no murmurs present [Edema] : no peripheral edema present [Abdomen Soft] : soft [Abnormal Walk] : normal gait [Skin Color & Pigmentation] : normal skin color and pigmentation [Oriented To Time, Place, And Person] : oriented to person, place, and time [FreeTextEntry1] : trace edema

## 2019-09-10 ENCOUNTER — APPOINTMENT (OUTPATIENT)
Dept: HEART AND VASCULAR | Facility: CLINIC | Age: 79
End: 2019-09-10
Payer: MEDICARE

## 2019-09-10 VITALS
BODY MASS INDEX: 27.35 KG/M2 | HEIGHT: 75 IN | HEART RATE: 110 BPM | SYSTOLIC BLOOD PRESSURE: 100 MMHG | DIASTOLIC BLOOD PRESSURE: 65 MMHG | WEIGHT: 220 LBS

## 2019-09-10 LAB
ALBUMIN SERPL ELPH-MCNC: 4.1 G/DL
ALP BLD-CCNC: 63 U/L
ALT SERPL-CCNC: 26 U/L
ANION GAP SERPL CALC-SCNC: 18 MMOL/L
AST SERPL-CCNC: 26 U/L
BILIRUB SERPL-MCNC: 0.5 MG/DL
BUN SERPL-MCNC: 25 MG/DL
CALCIUM SERPL-MCNC: 9.4 MG/DL
CHLORIDE SERPL-SCNC: 107 MMOL/L
CO2 SERPL-SCNC: 19 MMOL/L
CREAT SERPL-MCNC: 1.36 MG/DL
GLUCOSE SERPL-MCNC: 79 MG/DL
NT-PROBNP SERPL-MCNC: 5785 PG/ML
POTASSIUM SERPL-SCNC: 4.7 MMOL/L
PROT SERPL-MCNC: 7 G/DL
SODIUM SERPL-SCNC: 144 MMOL/L
T4 FREE SERPL-MCNC: 1.4 NG/DL
T4 SERPL-MCNC: 6.9 UG/DL
TSH SERPL-ACNC: 3.81 UIU/ML

## 2019-09-10 PROCEDURE — 93283 PRGRMG EVAL IMPLANTABLE DFB: CPT

## 2019-09-10 PROCEDURE — 99214 OFFICE O/P EST MOD 30 MIN: CPT | Mod: 24,25

## 2019-09-10 NOTE — PHYSICAL EXAM
[General Appearance - Well Developed] : well developed [Normal Appearance] : normal appearance [Well Groomed] : well groomed [General Appearance - Well Nourished] : well nourished [No Deformities] : no deformities [General Appearance - In No Acute Distress] : no acute distress [Normal Conjunctiva] : the conjunctiva exhibited no abnormalities [Eyelids - No Xanthelasma] : the eyelids demonstrated no xanthelasmas [Normal Oral Mucosa] : normal oral mucosa [No Oral Pallor] : no oral pallor [No Oral Cyanosis] : no oral cyanosis [Normal Jugular Venous A Waves Present] : normal jugular venous A waves present [Normal Jugular Venous V Waves Present] : normal jugular venous V waves present [No Jugular Venous Castillo A Waves] : no jugular venous castillo A waves [Respiration, Rhythm And Depth] : normal respiratory rhythm and effort [Exaggerated Use Of Accessory Muscles For Inspiration] : no accessory muscle use [Auscultation Breath Sounds / Voice Sounds] : lungs were clear to auscultation bilaterally [Heart Rate And Rhythm] : heart rate and rhythm were normal [Heart Sounds] : normal S1 and S2 [Murmurs] : no murmurs present [Abdomen Soft] : soft [Abdomen Tenderness] : non-tender [Abdomen Mass (___ Cm)] : no abdominal mass palpated [Abnormal Walk] : normal gait [Gait - Sufficient For Exercise Testing] : the gait was sufficient for exercise testing [Nail Clubbing] : no clubbing of the fingernails [Cyanosis, Localized] : no localized cyanosis [Petechial Hemorrhages (___cm)] : no petechial hemorrhages [Skin Color & Pigmentation] : normal skin color and pigmentation [] : no rash [No Venous Stasis] : no venous stasis [Skin Lesions] : no skin lesions [No Skin Ulcers] : no skin ulcer [No Xanthoma] : no  xanthoma was observed [Oriented To Time, Place, And Person] : oriented to person, place, and time [Affect] : the affect was normal [Mood] : the mood was normal [No Anxiety] : not feeling anxious [FreeTextEntry1] : Well healed sternotomy [5th Left ICS - MCL] : palpated at the 5th LICS in the midclavicular line [Normal] : normal [Tachycardia] : tachycardic [Irregularly Irregular] : irregularly irregular

## 2019-09-11 ENCOUNTER — RX RENEWAL (OUTPATIENT)
Age: 79
End: 2019-09-11

## 2019-09-12 NOTE — HISTORY OF PRESENT ILLNESS
[FreeTextEntry1] : 79 y.o. male patient with a h/o mitral and aortic valve surgery ~ 4 years ago with Dr. Davis at [facility].  He has been told that he may need a "device" - possibly CRT.  He also has a h/o Atrial fibrillation that was noted post operatively.  He is "not quite right" - he has poor stamina and gets easily fatigued.  He has been undergoing treatment for chronic fatigue syndrom / lyme disease for this. Entresto has been uptitrated by Dr. Zuñiga.  He was admitted to [facility] 7/2019 with AF with RVR- s/p DCCV 7/3019 with ERAF.  He was loaded with AMIO and converted to SR.  S/P dual chamber ICD placement for primary prevention of SCD on 8/8/19.  He saw Dr. Zuñiga yesterday and was back in AF with RVR- aware of palpitations, fatigue.  Compliant with Eliquis without interruption or any bleeding issues.\par \par ECHO 4/2019 EF 20-25%, global LV hypokinesis, mild conc LVH, severe reversible diastolic dysfunciton (st III), mild-mod TR

## 2019-09-12 NOTE — REVIEW OF SYSTEMS
[Feeling Fatigued] : feeling fatigued [Shortness Of Breath] : shortness of breath [Dyspnea on exertion] : dyspnea during exertion [Joint Pain] : joint pain [Negative] : Heme/Lymph [Fever] : no fever [Headache] : no headache [Recent Weight Gain (___ Lbs)] : no recent weight gain [Chills] : no chills [Recent Weight Loss (___ Lbs)] : no recent weight loss [Chest  Pressure] : no chest pressure [Lower Ext Edema] : no extremity edema [Chest Pain] : no chest pain [Leg Claudication] : no intermittent leg claudication [Palpitations] : no palpitations [Joint Swelling] : no joint swelling [Joint Stiffness] : no joint stiffness [Muscle Cramps] : no muscle cramps [Limb Weakness (Paresis)] : no limb weakness

## 2019-09-18 ENCOUNTER — OUTPATIENT (OUTPATIENT)
Dept: OUTPATIENT SERVICES | Facility: HOSPITAL | Age: 79
LOS: 1 days | Discharge: HOME | End: 2019-09-18
Payer: MEDICARE

## 2019-09-18 DIAGNOSIS — Z90.79 ACQUIRED ABSENCE OF OTHER GENITAL ORGAN(S): Chronic | ICD-10-CM

## 2019-09-18 DIAGNOSIS — Z95.2 PRESENCE OF PROSTHETIC HEART VALVE: Chronic | ICD-10-CM

## 2019-09-18 DIAGNOSIS — Z98.890 OTHER SPECIFIED POSTPROCEDURAL STATES: Chronic | ICD-10-CM

## 2019-09-18 PROCEDURE — 92960 CARDIOVERSION ELECTRIC EXT: CPT

## 2019-09-19 ENCOUNTER — APPOINTMENT (OUTPATIENT)
Dept: DERMATOLOGY | Facility: CLINIC | Age: 79
End: 2019-09-19
Payer: MEDICARE

## 2019-09-19 VITALS — DIASTOLIC BLOOD PRESSURE: 72 MMHG | SYSTOLIC BLOOD PRESSURE: 104 MMHG

## 2019-09-19 DIAGNOSIS — D23.9 OTHER BENIGN NEOPLASM OF SKIN, UNSPECIFIED: ICD-10-CM

## 2019-09-19 DIAGNOSIS — Z85.828 PERSONAL HISTORY OF OTHER MALIGNANT NEOPLASM OF SKIN: ICD-10-CM

## 2019-09-19 DIAGNOSIS — Z91.89 OTHER SPECIFIED PERSONAL RISK FACTORS, NOT ELSEWHERE CLASSIFIED: ICD-10-CM

## 2019-09-19 PROCEDURE — D0051: CPT

## 2019-09-19 PROCEDURE — 99202 OFFICE O/P NEW SF 15 MIN: CPT

## 2019-09-20 PROCEDURE — C1721: CPT

## 2019-09-20 PROCEDURE — 93641 EP EVL 1/2CHMB PAC CVDFB TST: CPT

## 2019-09-20 PROCEDURE — 33249 INSJ/RPLCMT DEFIB W/LEAD(S): CPT | Mod: QO

## 2019-09-20 PROCEDURE — C1769: CPT

## 2019-09-20 PROCEDURE — 71046 X-RAY EXAM CHEST 2 VIEWS: CPT

## 2019-09-20 PROCEDURE — C1894: CPT

## 2019-09-20 PROCEDURE — C1892: CPT

## 2019-09-20 PROCEDURE — C1773: CPT

## 2019-09-20 PROCEDURE — C1777: CPT

## 2019-09-20 PROCEDURE — C1898: CPT

## 2019-09-24 ENCOUNTER — NON-APPOINTMENT (OUTPATIENT)
Age: 79
End: 2019-09-24

## 2019-09-24 ENCOUNTER — APPOINTMENT (OUTPATIENT)
Dept: HEART AND VASCULAR | Facility: CLINIC | Age: 79
End: 2019-09-24
Payer: MEDICARE

## 2019-09-24 VITALS
DIASTOLIC BLOOD PRESSURE: 60 MMHG | SYSTOLIC BLOOD PRESSURE: 92 MMHG | WEIGHT: 220 LBS | BODY MASS INDEX: 27.35 KG/M2 | HEIGHT: 75 IN | HEART RATE: 71 BPM

## 2019-09-24 DIAGNOSIS — I45.4 NONSPECIFIC INTRAVENTRICULAR BLOCK: ICD-10-CM

## 2019-09-24 PROCEDURE — 99214 OFFICE O/P EST MOD 30 MIN: CPT | Mod: 25

## 2019-09-24 PROCEDURE — 93000 ELECTROCARDIOGRAM COMPLETE: CPT

## 2019-09-24 NOTE — DISCUSSION/SUMMARY
[FreeTextEntry1] : EKG:atrial pacing,IVCD\par / if his Sx are lyme, CHF,both? his lungs are clear but has edema,? amio Sx\par will increase Lasix( baseline BP low)\par cont entresto,dig BB for ICM/CHF, Eliquis +amio for PAF, he will be seeing ID tomorrow- his probnp was elevated- i will repeat\par will have him see HF team

## 2019-09-24 NOTE — PHYSICAL EXAM
[General Appearance - Well Developed] : well developed [Normal Appearance] : normal appearance [Well Groomed] : well groomed [General Appearance - Well Nourished] : well nourished [No Deformities] : no deformities [General Appearance - In No Acute Distress] : no acute distress [Normal Conjunctiva] : the conjunctiva exhibited no abnormalities [Normal Oral Mucosa] : normal oral mucosa [] : no respiratory distress [Exaggerated Use Of Accessory Muscles For Inspiration] : no accessory muscle use [Respiration, Rhythm And Depth] : normal respiratory rhythm and effort [Auscultation Breath Sounds / Voice Sounds] : lungs were clear to auscultation bilaterally [Heart Rate And Rhythm] : heart rate and rhythm were normal [Heart Sounds] : normal S1 and S2 [Murmurs] : no murmurs present [Abdomen Soft] : soft [Abdomen Tenderness] : non-tender [Abnormal Walk] : normal gait [FreeTextEntry1] : bilateral LE edema>R) [Skin Color & Pigmentation] : normal skin color and pigmentation [Oriented To Time, Place, And Person] : oriented to person, place, and time

## 2019-09-25 LAB
ANION GAP SERPL CALC-SCNC: 12 MMOL/L
BUN SERPL-MCNC: 24 MG/DL
CALCIUM SERPL-MCNC: 9.5 MG/DL
CHLORIDE SERPL-SCNC: 110 MMOL/L
CO2 SERPL-SCNC: 23 MMOL/L
CREAT SERPL-MCNC: 1.12 MG/DL
DIGOXIN SERPL-MCNC: 0.5 NG/ML
GLUCOSE SERPL-MCNC: 98 MG/DL
NT-PROBNP SERPL-MCNC: 6878 PG/ML
POTASSIUM SERPL-SCNC: 4.5 MMOL/L
SODIUM SERPL-SCNC: 145 MMOL/L

## 2019-09-30 DIAGNOSIS — I48.91 UNSPECIFIED ATRIAL FIBRILLATION: ICD-10-CM

## 2019-10-01 PROCEDURE — 83036 HEMOGLOBIN GLYCOSYLATED A1C: CPT

## 2019-10-01 PROCEDURE — 84443 ASSAY THYROID STIM HORMONE: CPT

## 2019-10-01 PROCEDURE — 96375 TX/PRO/DX INJ NEW DRUG ADDON: CPT

## 2019-10-01 PROCEDURE — 36415 COLL VENOUS BLD VENIPUNCTURE: CPT

## 2019-10-01 PROCEDURE — 85025 COMPLETE CBC W/AUTO DIFF WBC: CPT

## 2019-10-01 PROCEDURE — 93005 ELECTROCARDIOGRAM TRACING: CPT

## 2019-10-01 PROCEDURE — 80048 BASIC METABOLIC PNL TOTAL CA: CPT

## 2019-10-01 PROCEDURE — 82550 ASSAY OF CK (CPK): CPT

## 2019-10-01 PROCEDURE — 85027 COMPLETE CBC AUTOMATED: CPT

## 2019-10-01 PROCEDURE — 82553 CREATINE MB FRACTION: CPT

## 2019-10-01 PROCEDURE — 83735 ASSAY OF MAGNESIUM: CPT

## 2019-10-01 PROCEDURE — 96374 THER/PROPH/DIAG INJ IV PUSH: CPT

## 2019-10-01 PROCEDURE — 80053 COMPREHEN METABOLIC PANEL: CPT

## 2019-10-01 PROCEDURE — 80162 ASSAY OF DIGOXIN TOTAL: CPT

## 2019-10-01 PROCEDURE — 83880 ASSAY OF NATRIURETIC PEPTIDE: CPT

## 2019-10-01 PROCEDURE — 71045 X-RAY EXAM CHEST 1 VIEW: CPT

## 2019-10-01 PROCEDURE — 80061 LIPID PANEL: CPT

## 2019-10-01 PROCEDURE — 99291 CRITICAL CARE FIRST HOUR: CPT | Mod: 25

## 2019-10-01 PROCEDURE — 84484 ASSAY OF TROPONIN QUANT: CPT

## 2019-10-28 ENCOUNTER — APPOINTMENT (OUTPATIENT)
Dept: HEART AND VASCULAR | Facility: CLINIC | Age: 79
End: 2019-10-28
Payer: MEDICARE

## 2019-10-28 VITALS
WEIGHT: 209 LBS | SYSTOLIC BLOOD PRESSURE: 80 MMHG | DIASTOLIC BLOOD PRESSURE: 56 MMHG | HEIGHT: 72.44 IN | OXYGEN SATURATION: 98 % | TEMPERATURE: 97.9 F | HEART RATE: 70 BPM | BODY MASS INDEX: 28 KG/M2

## 2019-10-28 DIAGNOSIS — I27.20 PULMONARY HYPERTENSION, UNSPECIFIED: ICD-10-CM

## 2019-10-28 DIAGNOSIS — Z95.0 PRESENCE OF CARDIAC PACEMAKER: ICD-10-CM

## 2019-10-28 PROCEDURE — 99214 OFFICE O/P EST MOD 30 MIN: CPT

## 2019-10-28 PROCEDURE — 93000 ELECTROCARDIOGRAM COMPLETE: CPT

## 2019-10-28 NOTE — ASSESSMENT
[FreeTextEntry1] : 78 yo with hx of aortic and mitral valve replacements. Apparently for long standing mitral regurgitation.\par Main complaint now is fatigue.\par EF 20-25%\par Of note, PA pressures 60 which is likely  FIXED PULMONARY HYPERTENSION due to chronic mitral regurgitation.\par Contributors to fatigue are also  lyme disease, and low blood pressure.\par \par Will initially lower lasix from 40/ 20 daily to 20 daily and lower toprol form 25 bid to 25 bid.\par Doubt the pulmonary hypertension is reversible (secondary to MR). can consider cath lab and infusion of milrinone for improving cardiac output and pulmonary vasodialtion. But I suspect no benefit and symptomatic dizziness will ensue\par \par discussed at length with Dr. Samaniego, patient and wife.

## 2019-10-28 NOTE — PHYSICAL EXAM
[General Appearance - Well Developed] : well developed [Normal Appearance] : normal appearance [Well Groomed] : well groomed [General Appearance - Well Nourished] : well nourished [No Deformities] : no deformities [General Appearance - In No Acute Distress] : no acute distress [Normal Conjunctiva] : the conjunctiva exhibited no abnormalities [Eyelids - No Xanthelasma] : the eyelids demonstrated no xanthelasmas [Normal Oral Mucosa] : normal oral mucosa [No Oral Pallor] : no oral pallor [No Oral Cyanosis] : no oral cyanosis [Normal Jugular Venous A Waves Present] : normal jugular venous A waves present [Normal Jugular Venous V Waves Present] : normal jugular venous V waves present [No Jugular Venous Castillo A Waves] : no jugular venous castillo A waves [5th Left ICS - MCL] : palpated at the 5th LICS in the midclavicular line [Normal] : normal [Normal Rate] : normal [Rhythm Regular] : regular [Normal S1] : normal S1 [Normal S2] : normal S2 [___ +] : bilateral [unfilled]U+ pitting edema to the ankles [Respiration, Rhythm And Depth] : normal respiratory rhythm and effort [Exaggerated Use Of Accessory Muscles For Inspiration] : no accessory muscle use [Auscultation Breath Sounds / Voice Sounds] : lungs were clear to auscultation bilaterally [Abdomen Soft] : soft [Abdomen Tenderness] : non-tender [Abdomen Mass (___ Cm)] : no abdominal mass palpated [Abnormal Walk] : normal gait [Gait - Sufficient For Exercise Testing] : the gait was sufficient for exercise testing [Nail Clubbing] : no clubbing of the fingernails [Cyanosis, Localized] : no localized cyanosis [Petechial Hemorrhages (___cm)] : no petechial hemorrhages [Skin Color & Pigmentation] : normal skin color and pigmentation [] : no rash [No Venous Stasis] : no venous stasis [Skin Lesions] : no skin lesions [No Skin Ulcers] : no skin ulcer [No Xanthoma] : no  xanthoma was observed [Oriented To Time, Place, And Person] : oriented to person, place, and time [Affect] : the affect was normal [Mood] : the mood was normal [No Anxiety] : not feeling anxious

## 2019-10-28 NOTE — HISTORY OF PRESENT ILLNESS
[FreeTextEntry1] : \par 80 yo referred by Dr. Zuñiga. \par Followed by Diana\par  h/o mitral and aortic valve surgery ~ 4 years ago with Dr. Davis at [facility]. (apparenltly MR, AR)\par He also has a h/o Atrial fibrillation that was noted post operatively. \par \par Main complaint is poor stamina and gets easily fatigued. \par He has been undergoing treatment for chronic fatigue syndrome / lyme disease for this. \par Entresto uptitration limited by low BP.\par He was admitted to [facility] 7/2019 with AF with RVR- s/p DCCV 7/3019 with ERAF. He was loaded with AMIO and converted to SR. S/P dual chamber ICD placement for primary prevention of SCD on 8/8/19. \par \par ECHO 4/2019 EF 20-25%, global LV hypokinesis, mild conc LVH, severe reversible diastolic dysfunction (st III), mild-mod TR. ELEVATED PA PRESSURE OF 60: MODERATE PULMONARY HYPERTENSION \par \par DOES GO TO GYM 3 TIMES A WEEK. BUT VERY LITTLE STAMINA.\par \par

## 2019-11-12 ENCOUNTER — APPOINTMENT (OUTPATIENT)
Dept: HEART AND VASCULAR | Facility: CLINIC | Age: 79
End: 2019-11-12
Payer: MEDICARE

## 2019-11-12 ENCOUNTER — NON-APPOINTMENT (OUTPATIENT)
Age: 79
End: 2019-11-12

## 2019-11-12 VITALS
BODY MASS INDEX: 29.07 KG/M2 | HEIGHT: 72.44 IN | HEART RATE: 68 BPM | WEIGHT: 217 LBS | SYSTOLIC BLOOD PRESSURE: 92 MMHG | DIASTOLIC BLOOD PRESSURE: 64 MMHG

## 2019-11-12 DIAGNOSIS — R06.02 SHORTNESS OF BREATH: ICD-10-CM

## 2019-11-12 PROCEDURE — 99214 OFFICE O/P EST MOD 30 MIN: CPT | Mod: 25

## 2019-11-12 PROCEDURE — 93000 ELECTROCARDIOGRAM COMPLETE: CPT

## 2019-11-12 RX ORDER — FUROSEMIDE 20 MG/1
20 TABLET ORAL TWICE DAILY
Qty: 60 | Refills: 2 | Status: ACTIVE | COMMUNITY
Start: 2019-06-24

## 2019-11-12 NOTE — HISTORY OF PRESENT ILLNESS
[FreeTextEntry1] : meds reduced- feels less fatigued but still with ALCANTAR- has gained weight but states has been eating more,+ nocturnal cough

## 2019-11-12 NOTE — REVIEW OF SYSTEMS
[Headache] : no headache [Fever] : no fever [Recent Weight Gain (___ Lbs)] : recent [unfilled] ~Ulb weight gain [Chills] : no chills [Feeling Fatigued] : feeling fatigued [Loss Of Hearing] : hearing loss [Recent Weight Loss (___ Lbs)] : no recent weight loss [see HPI] : see HPI [Dyspnea on exertion] : dyspnea during exertion [Cough] : cough [Hematuria] : no hematuria [Coughing Up Blood] : no hemoptysis [Wheezing] : no wheezing [Nocturia] : nocturia [Joint Pain] : joint pain [Knee Problem] : knee problems [Dizziness] : no dizziness [Tremor] : no tremor was seen [Numbness (Hypesthesia)] : no numbness [Convulsions] : no convulsions [Tingling (Paresthesia)] : no tingling [Confusion] : no confusion was observed [Memory Lapses Or Loss] : memory lapses or loss [Depression] : no depression [Anxiety] : no anxiety [Under Stress] : under stress [Suicidal] : not suicidal [Easy Bleeding] : no tendency for easy bleeding [Easy Bruising] : a tendency for easy bruising [Negative] : Neurological

## 2019-11-12 NOTE — DISCUSSION/SUMMARY
[FreeTextEntry1] : EKG nsr with atrial and v pacing\par cont Eliquis for af, toprol+ Lasix+ entresto for CHF?CM, amio for PAF\par discussed with Dr Urias- consider IV milrinone- will set up as Cath and CCU monitoring for CHF\par will discuss with Dr Willoughby

## 2019-11-12 NOTE — PHYSICAL EXAM
[General Appearance - Well Developed] : well developed [Normal Appearance] : normal appearance [General Appearance - Well Nourished] : well nourished [Well Groomed] : well groomed [General Appearance - In No Acute Distress] : no acute distress [No Deformities] : no deformities [Normal Conjunctiva] : the conjunctiva exhibited no abnormalities [FreeTextEntry1] : mild + JVD [Normal Oral Mucosa] : normal oral mucosa [Respiration, Rhythm And Depth] : normal respiratory rhythm and effort [] : no respiratory distress [Exaggerated Use Of Accessory Muscles For Inspiration] : no accessory muscle use [Heart Rate And Rhythm] : heart rate and rhythm were normal [Auscultation Breath Sounds / Voice Sounds] : lungs were clear to auscultation bilaterally [Heart Sounds] : normal S1 and S2 [Murmurs] : no murmurs present [Abdomen Soft] : soft [Abdomen Tenderness] : non-tender [Abnormal Walk] : normal gait [Nail Clubbing] : no clubbing of the fingernails [Skin Color & Pigmentation] : normal skin color and pigmentation [Oriented To Time, Place, And Person] : oriented to person, place, and time

## 2019-11-19 VITALS
SYSTOLIC BLOOD PRESSURE: 90 MMHG | WEIGHT: 213.41 LBS | HEIGHT: 75 IN | TEMPERATURE: 98 F | OXYGEN SATURATION: 98 % | RESPIRATION RATE: 16 BRPM | DIASTOLIC BLOOD PRESSURE: 67 MMHG | HEART RATE: 70 BPM

## 2019-11-19 RX ORDER — CHLORHEXIDINE GLUCONATE 213 G/1000ML
1 SOLUTION TOPICAL ONCE
Refills: 0 | Status: DISCONTINUED | OUTPATIENT
Start: 2019-11-20 | End: 2019-11-20

## 2019-11-19 NOTE — H&P ADULT - NSICDXPASTMEDICALHX_GEN_ALL_CORE_FT
PAST MEDICAL HISTORY:  Atrial fibrillation     Basal cell carcinoma     Congestive heart failure (CHF)     GERD (gastroesophageal reflux disease)     H/O pulmonary hypertension     History of abdominal aortic aneurysm (AAA)     Lyme disease     ROHAN on CPAP     S/P aortic valve replacement     S/P mitral valve replacement     Varicose veins of lower extremities with complications Varicose veins of both legs with edema

## 2019-11-19 NOTE — H&P ADULT - HISTORY OF PRESENT ILLNESS
****SKELETON****    80yo Male with PMHx of pAfib s/p DCCV on 7/30/19 and s/p AICD on 8/8/19 with Dr. Ortiz (on Eliquis, last dose____), chronic systolic CHF (EF 20-25% by Echo 4/2019), bioprosthetic AVR and MVR ([facility] 12/2014), ROHAN (on CPAP), pHTN, and Lyme disease who presented to Dr. Zuñiga for routine CHF follow up. He endorses persistent ALCANTAR with ___. He reports he gained weight, but has been eating more. Pt denies ____ CP, palpitations, dizziness, syncope, fatigue, LE edema, SOB at rest, orthopnea, PND, N/V, fever or chills.   In light of pts risk factors, persistent CCS class __ anginal symptoms and poorly managed CHF, pt now presents to [facility] for recommended Right heart catheterization and to be admitted to CCU post cath for further management of CHF. 78yo Male with PMHx of pAfib s/p DCCV on 7/30/19 and s/p AICD on 8/8/19 with Dr. Ortiz (on Eliquis, last dose__11/2019__), chronic systolic CHF (EF 20-25% by Echo 4/2019), bioprosthetic AVR and MVR ([facility] 12/2014), ROHAN (on CPAP), pHTN, and Lyme disease (on Acyclovir prophylactically), s/p TURP in 2004 who presented to Dr. Zuñiga for routine CHF follow up. He endorses persistent ALCANTAR with minimal exertion and 2 pillow orthopnea. Pt also endorses LE edema. Pt states that occasionally he has palpitations and dizziness. Pt claims that he has been feeling more fatigued than usual. He reports he gained weight, but has been eating more. Pt denies CP, syncope, PND, N/V, fever or chills, blood in the stool.   In light of pts risk factors, persistent CCS class _4_ anginal symptoms and poorly managed CHF, pt now presents to [facility] for recommended Right heart catheterization with [place] placement and to be admitted to CCU post cath for further management of CHF and possible milrinone initiation. 80yo Male with PMHx of pAfib s/p DCCV on 7/30/19 and s/p AICD on 8/8/19 with Dr. Ortiz (on Eliquis, last dose__11/20/2019__), chronic systolic CHF (EF 20-25% by Echo 4/2019), bioprosthetic AVR and MVR ([facility] 12/2014), ROHAN (on CPAP), pHTN, and Lyme disease (on Acyclovir prophylactically), s/p TURP in 2004 who presented to Dr. Zuñiga for routine CHF follow up. He endorses persistent ALCANTAR with minimal exertion and 2 pillow orthopnea. Pt also endorses LE edema. Pt states that occasionally he has palpitations and dizziness. Pt claims that he has been feeling more fatigued than usual. He reports he gained weight, but has been eating more. Pt denies CP, syncope, PND, N/V, fever or chills, blood in the stool.   In light of pts risk factors, persistent CCS class _4_ anginal symptoms and poorly managed CHF, pt now presents to [facility] for recommended Right heart catheterization with [place] placement and to be admitted to CCU post cath for further management of CHF and possible milrinone initiation.

## 2019-11-19 NOTE — H&P ADULT - ASSESSMENT
78yo Male with PMHx of pAfib s/p DCCV on 7/30/19 and s/p AICD on 8/8/19 with Dr. Ortiz (on Eliquis, last dose__11/2019__), chronic systolic CHF (EF 20-25% by Echo 4/2019), bioprosthetic AVR and MVR ([facility] 12/2014), ROHAN (on CPAP), pHTN, and Lyme disease (on Acyclovir prophylactically), s/p TURP in 2004 who presented to [facility] for RHC with [place] placement and for admission to CCU for possible milrinone gtt initiation in light of pt's risk factors, CCS 4 anginal symptoms and poorly managed CHF and pulmonary HTN.      ASA III and Mallampati III    OF NOTE; Pt took Eliquis today and INR is 1.4, Dr. Willoughby is aware.     Risks & benefits of procedure and alternative therapy have been explained to the patient including but not limited to: allergic reaction, bleeding w/possible need for blood transfusion, infection, renal and vascular compromise, limb damage, arrhythmia, stroke, vessel dissection/perforation, Myocardial infarction, emergent CABG. Informed consent obtained and in chart.

## 2019-11-20 ENCOUNTER — INPATIENT (INPATIENT)
Facility: HOSPITAL | Age: 79
LOS: 0 days | Discharge: HOME | DRG: 287 | End: 2019-11-21
Attending: HOSPITALIST | Admitting: HOSPITALIST
Payer: COMMERCIAL

## 2019-11-20 DIAGNOSIS — Z95.2 PRESENCE OF PROSTHETIC HEART VALVE: ICD-10-CM

## 2019-11-20 DIAGNOSIS — Z90.79 ACQUIRED ABSENCE OF OTHER GENITAL ORGAN(S): Chronic | ICD-10-CM

## 2019-11-20 DIAGNOSIS — G47.33 OBSTRUCTIVE SLEEP APNEA (ADULT) (PEDIATRIC): ICD-10-CM

## 2019-11-20 DIAGNOSIS — I50.9 HEART FAILURE, UNSPECIFIED: ICD-10-CM

## 2019-11-20 DIAGNOSIS — I48.91 UNSPECIFIED ATRIAL FIBRILLATION: ICD-10-CM

## 2019-11-20 DIAGNOSIS — R63.8 OTHER SYMPTOMS AND SIGNS CONCERNING FOOD AND FLUID INTAKE: ICD-10-CM

## 2019-11-20 DIAGNOSIS — Z95.2 PRESENCE OF PROSTHETIC HEART VALVE: Chronic | ICD-10-CM

## 2019-11-20 DIAGNOSIS — R06.09 OTHER FORMS OF DYSPNEA: ICD-10-CM

## 2019-11-20 DIAGNOSIS — Z98.890 OTHER SPECIFIED POSTPROCEDURAL STATES: Chronic | ICD-10-CM

## 2019-11-20 DIAGNOSIS — A69.20 LYME DISEASE, UNSPECIFIED: ICD-10-CM

## 2019-11-20 LAB
ALBUMIN SERPL ELPH-MCNC: 4.5 G/DL — SIGNIFICANT CHANGE UP (ref 3.3–5)
ALP SERPL-CCNC: 73 U/L — SIGNIFICANT CHANGE UP (ref 40–120)
ALT FLD-CCNC: 26 U/L — SIGNIFICANT CHANGE UP (ref 10–45)
ANION GAP SERPL CALC-SCNC: 11 MMOL/L — SIGNIFICANT CHANGE UP (ref 5–17)
APTT BLD: 34.6 SEC — SIGNIFICANT CHANGE UP (ref 27.5–36.3)
AST SERPL-CCNC: 24 U/L — SIGNIFICANT CHANGE UP (ref 10–40)
BASOPHILS # BLD AUTO: 0.06 K/UL — SIGNIFICANT CHANGE UP (ref 0–0.2)
BASOPHILS NFR BLD AUTO: 1.7 % — SIGNIFICANT CHANGE UP (ref 0–2)
BILIRUB SERPL-MCNC: 0.6 MG/DL — SIGNIFICANT CHANGE UP (ref 0.2–1.2)
BUN SERPL-MCNC: 27 MG/DL — HIGH (ref 7–23)
CALCIUM SERPL-MCNC: 9.4 MG/DL — SIGNIFICANT CHANGE UP (ref 8.4–10.5)
CHLORIDE SERPL-SCNC: 104 MMOL/L — SIGNIFICANT CHANGE UP (ref 96–108)
CHOLEST SERPL-MCNC: 161 MG/DL — SIGNIFICANT CHANGE UP (ref 10–199)
CK MB CFR SERPL CALC: 3.6 NG/ML — SIGNIFICANT CHANGE UP (ref 0–6.7)
CK SERPL-CCNC: 113 U/L — SIGNIFICANT CHANGE UP (ref 30–200)
CO2 SERPL-SCNC: 25 MMOL/L — SIGNIFICANT CHANGE UP (ref 22–31)
CREAT SERPL-MCNC: 1.2 MG/DL — SIGNIFICANT CHANGE UP (ref 0.5–1.3)
EOSINOPHIL # BLD AUTO: 0.29 K/UL — SIGNIFICANT CHANGE UP (ref 0–0.5)
EOSINOPHIL NFR BLD AUTO: 7.8 % — HIGH (ref 0–6)
GLUCOSE SERPL-MCNC: 100 MG/DL — HIGH (ref 70–99)
HBA1C BLD-MCNC: 5.8 % — HIGH (ref 4–5.6)
HCT VFR BLD CALC: 42.5 % — SIGNIFICANT CHANGE UP (ref 39–50)
HDLC SERPL-MCNC: 46 MG/DL — SIGNIFICANT CHANGE UP
HGB BLD-MCNC: 13.8 G/DL — SIGNIFICANT CHANGE UP (ref 13–17)
INR BLD: 1.41 — HIGH (ref 0.88–1.16)
LIPID PNL WITH DIRECT LDL SERPL: 97 MG/DL — SIGNIFICANT CHANGE UP
LYMPHOCYTES # BLD AUTO: 0.52 K/UL — LOW (ref 1–3.3)
LYMPHOCYTES # BLD AUTO: 13.9 % — SIGNIFICANT CHANGE UP (ref 13–44)
MCHC RBC-ENTMCNC: 31.5 PG — SIGNIFICANT CHANGE UP (ref 27–34)
MCHC RBC-ENTMCNC: 32.5 GM/DL — SIGNIFICANT CHANGE UP (ref 32–36)
MCV RBC AUTO: 97 FL — SIGNIFICANT CHANGE UP (ref 80–100)
MONOCYTES # BLD AUTO: 0.55 K/UL — SIGNIFICANT CHANGE UP (ref 0–0.9)
MONOCYTES NFR BLD AUTO: 14.8 % — HIGH (ref 2–14)
NEUTROPHILS # BLD AUTO: 2.29 K/UL — SIGNIFICANT CHANGE UP (ref 1.8–7.4)
NEUTROPHILS NFR BLD AUTO: 61.8 % — SIGNIFICANT CHANGE UP (ref 43–77)
PLATELET # BLD AUTO: 151 K/UL — SIGNIFICANT CHANGE UP (ref 150–400)
POTASSIUM SERPL-MCNC: 4.4 MMOL/L — SIGNIFICANT CHANGE UP (ref 3.5–5.3)
POTASSIUM SERPL-SCNC: 4.4 MMOL/L — SIGNIFICANT CHANGE UP (ref 3.5–5.3)
PROT SERPL-MCNC: 7.4 G/DL — SIGNIFICANT CHANGE UP (ref 6–8.3)
PROTHROM AB SERPL-ACNC: 16.1 SEC — HIGH (ref 10–12.9)
RBC # BLD: 4.38 M/UL — SIGNIFICANT CHANGE UP (ref 4.2–5.8)
RBC # FLD: 14.6 % — HIGH (ref 10.3–14.5)
SODIUM SERPL-SCNC: 140 MMOL/L — SIGNIFICANT CHANGE UP (ref 135–145)
TOTAL CHOLESTEROL/HDL RATIO MEASUREMENT: 3.5 RATIO — SIGNIFICANT CHANGE UP (ref 3.4–9.6)
TRIGL SERPL-MCNC: 91 MG/DL — SIGNIFICANT CHANGE UP (ref 10–149)
WBC # BLD: 3.71 K/UL — LOW (ref 3.8–10.5)
WBC # FLD AUTO: 3.71 K/UL — LOW (ref 3.8–10.5)

## 2019-11-20 PROCEDURE — 99291 CRITICAL CARE FIRST HOUR: CPT

## 2019-11-20 PROCEDURE — 93451 RIGHT HEART CATH: CPT | Mod: 26

## 2019-11-20 PROCEDURE — 93010 ELECTROCARDIOGRAM REPORT: CPT

## 2019-11-20 RX ORDER — AMIODARONE HYDROCHLORIDE 400 MG/1
200 TABLET ORAL DAILY
Refills: 0 | Status: DISCONTINUED | OUTPATIENT
Start: 2019-11-20 | End: 2019-11-21

## 2019-11-20 RX ORDER — ENOXAPARIN SODIUM 100 MG/ML
40 INJECTION SUBCUTANEOUS EVERY 24 HOURS
Refills: 0 | Status: DISCONTINUED | OUTPATIENT
Start: 2019-11-20 | End: 2019-11-20

## 2019-11-20 RX ORDER — METOPROLOL TARTRATE 50 MG
12.5 TABLET ORAL EVERY 12 HOURS
Refills: 0 | Status: DISCONTINUED | OUTPATIENT
Start: 2019-11-20 | End: 2019-11-21

## 2019-11-20 RX ORDER — DIGOXIN 250 MCG
0.12 TABLET ORAL DAILY
Refills: 0 | Status: DISCONTINUED | OUTPATIENT
Start: 2019-11-20 | End: 2019-11-21

## 2019-11-20 RX ORDER — APIXABAN 2.5 MG/1
1 TABLET, FILM COATED ORAL
Qty: 0 | Refills: 0 | DISCHARGE

## 2019-11-20 RX ORDER — APIXABAN 2.5 MG/1
5 TABLET, FILM COATED ORAL EVERY 12 HOURS
Refills: 0 | Status: DISCONTINUED | OUTPATIENT
Start: 2019-11-20 | End: 2019-11-21

## 2019-11-20 RX ADMIN — APIXABAN 5 MILLIGRAM(S): 2.5 TABLET, FILM COATED ORAL at 21:42

## 2019-11-20 RX ADMIN — Medication 12.5 MILLIGRAM(S): at 21:42

## 2019-11-20 NOTE — PROGRESS NOTE ADULT - SUBJECTIVE AND OBJECTIVE BOX
TRANSFER ACCEPTANCE NOTE FOR CCU  79M with PMHx of pAfib s/p DCCV on 7/30/19 and s/p AICD on 8/8/19 with Dr. Ortiz (on Eliquis), chronic systolic CHF, bioprosthetic AVR and MVR ([facility] 12/2014), ROHAN (on CPAP), pHTN, and Lyme disease (on Acyclovir prophylactically), s/p TURP in 2004 who was admitted to [facility] for right-heart cath with placement of swan cl placement for evaluation of persistent ALCANTAR with minimal exertion (1/3 city block). Pt also notes orthopnea, fatigue, palpitations, and dizziness. Pt denies CP, syncope, PND, N/V, fever or chills, abd pain, or blood in the stool. Admitted to CCU s/p RHC with RIJ SGC placed. Mean pulm pressure of 40 and PCWP of 13. Pt tehn denied dyspnea at rest, chest pain, lightheadedness, fatigue, orthopnea, PND. Pt does endorse intermittent joint pain. SGC removed and pt stable for SD.     VITAL SIGNS:  Vital Signs Last 24 Hrs  T(C): 36.7 (20 Nov 2019 16:43), Max: 36.7 (20 Nov 2019 16:43)  T(F): 98 (20 Nov 2019 16:43), Max: 98 (20 Nov 2019 16:43)  HR: 70 (20 Nov 2019 17:00) (70 - 70)  BP: 93/65 (20 Nov 2019 16:08) (93/65 - 93/65)  BP(mean): 73 (20 Nov 2019 16:08) (73 - 73)  RR: 25 (20 Nov 2019 17:00) (12 - 25)  SpO2: 94% (20 Nov 2019 17:00) (94% - 94%)    PHYSICAL EXAM:  General: WDWN; Patient is in NAD  HEENT: NC/AT; PERRL, anicteric sclera; MMM  Neck: supple  Cardiovascular: +S1/S2, RRR  Respiratory: CTA B/L; no W/R/R  Gastrointestinal: soft, NT/ND; +BS  Extremities: WWP; trace edema at ankles  Vascular: 2+ radial and DP pulses B/L  Neurological: AAOx3; no focal deficits    MEDICATIONS:  MEDICATIONS  (STANDING):  aMIOdarone    Tablet 200 milliGRAM(s) Oral daily  apixaban 5 milliGRAM(s) Oral every 12 hours  chlorhexidine 4% Liquid 1 Application(s) Topical once  digoxin     Tablet 0.125 milliGRAM(s) Oral daily  metoprolol tartrate 12.5 milliGRAM(s) Oral every 12 hours    MEDICATIONS  (PRN):      ALLERGIES:  Allergies    Zithromax (Unknown)    Intolerances        LABS:                        13.8   3.71  )-----------( 151      ( 20 Nov 2019 13:57 )             42.5     11-20    140  |  104  |  27<H>  ----------------------------<  100<H>  4.4   |  25  |  1.20    Ca    9.4      20 Nov 2019 13:57    TPro  7.4  /  Alb  4.5  /  TBili  0.6  /  DBili  x   /  AST  24  /  ALT  26  /  AlkPhos  73  11-20    PT/INR - ( 20 Nov 2019 13:57 )   PT: 16.1 sec;   INR: 1.41          PTT - ( 20 Nov 2019 13:57 )  PTT:34.6 sec    CAPILLARY BLOOD GLUCOSE          RADIOLOGY & ADDITIONAL TESTS: Reviewed. TRANSFER ACCEPTANCE NOTE from CCU to [facility]  79M with PMHx of pAfib s/p DCCV on 7/30/19 and s/p AICD on 8/8/19 with Dr. Ortiz (on Eliquis), chronic systolic CHF, bioprosthetic AVR and MVR ([facility] 12/2014), ROHAN (on CPAP), pHTN, and Lyme disease (on Acyclovir prophylactically), s/p TURP in 2004 who was admitted to [facility] for right-heart cath with placement of swan cl placement for evaluation of persistent ALCANTAR with minimal exertion (1/3 city block). Pt also notes orthopnea, fatigue, palpitations, and dizziness. Pt denies CP, syncope, PND, N/V, fever or chills, abd pain, or blood in the stool. Admitted to CCU s/p RHC with RIJ SGC placed. Mean pulm pressure of 40 and PCWP of 13. Pt then denied dyspnea at rest, chest pain, lightheadedness, fatigue, orthopnea, PND. Pt does endorse intermittent joint pain. SGC removed and pt stable for SD.     Vital Signs Last 24 Hrs  T(C): 36.7 (20 Nov 2019 16:43), Max: 36.7 (20 Nov 2019 16:43)  T(F): 98 (20 Nov 2019 16:43), Max: 98 (20 Nov 2019 16:43)  HR: 90 (20 Nov 2019 22:00) (70 - 90)  BP: 98/65 (20 Nov 2019 22:00) (90/59 - 105/74)  BP(mean): 77 (20 Nov 2019 22:00) (68 - 86)  RR: 17 (20 Nov 2019 22:00) (12 - 35)  SpO2: 94% (20 Nov 2019 22:00) (93% - 97%)    PHYSICAL EXAM:  General: WDWN; Patient is in NAD  HEENT: NC/AT; PERRL, anicteric sclera; MMM  Neck: supple  Cardiovascular: +S1/S2, RRR  Respiratory: CTA B/L; no W/R/R  Gastrointestinal: soft, NT/ND; +BS  Extremities: WWP; trace edema at ankles  Vascular: 2+ radial and DP pulses B/L  Neurological: AAOx3; no focal deficits    MEDICATIONS:  MEDICATIONS  (STANDING):  aMIOdarone    Tablet 200 milliGRAM(s) Oral daily  apixaban 5 milliGRAM(s) Oral every 12 hours  chlorhexidine 4% Liquid 1 Application(s) Topical once  digoxin     Tablet 0.125 milliGRAM(s) Oral daily  metoprolol tartrate 12.5 milliGRAM(s) Oral every 12 hours    MEDICATIONS  (PRN):      ALLERGIES:  Allergies    Zithromax (Unknown)    Intolerances        LABS:                        13.8   3.71  )-----------( 151      ( 20 Nov 2019 13:57 )             42.5     11-20    140  |  104  |  27<H>  ----------------------------<  100<H>  4.4   |  25  |  1.20    Ca    9.4      20 Nov 2019 13:57    TPro  7.4  /  Alb  4.5  /  TBili  0.6  /  DBili  x   /  AST  24  /  ALT  26  /  AlkPhos  73  11-20    PT/INR - ( 20 Nov 2019 13:57 )   PT: 16.1 sec;   INR: 1.41          PTT - ( 20 Nov 2019 13:57 )  PTT:34.6 sec    CAPILLARY BLOOD GLUCOSE          RADIOLOGY & ADDITIONAL TESTS: Reviewed. TRANSFER ACCEPTANCE NOTE from CCU to [facility]  79M with PMHx of pAfib s/p DCCV on 7/30/19 and s/p AICD on 8/8/19 with Dr. Ortiz (on Eliquis), chronic systolic CHF, bioprosthetic AVR and MVR ([facility] 12/2014), ROHAN (on CPAP), pHTN, and Lyme disease (on Acyclovir prophylactically), s/p TURP in 2004 who was admitted to [facility] for right-heart cath with placement of swan cl placement for evaluation of persistent ALCANTAR with minimal exertion (1/3 city block). Endorsed 3 pillow orthopnea, and reported  fatigue, palpitations, and dizziness on admission, w/o  CP, syncope, PND, N/V, fever or chills, abd pain, or blood in the stool. Admitted to CCU s/p RHC with RIJ SGC placed. Mean pulm pressure of 40 and PCWP of 13, remained stable and SGC removed. Pt asx and stable for SD.     Vital Signs Last 24 Hrs  T(C): 36.7 (20 Nov 2019 16:43), Max: 36.7 (20 Nov 2019 16:43)  T(F): 98 (20 Nov 2019 16:43), Max: 98 (20 Nov 2019 16:43)  HR: 90 (20 Nov 2019 22:00) (70 - 90)  BP: 98/65 (20 Nov 2019 22:00) (90/59 - 105/74)  BP(mean): 77 (20 Nov 2019 22:00) (68 - 86)  RR: 17 (20 Nov 2019 22:00) (12 - 35)  SpO2: 94% (20 Nov 2019 22:00) (93% - 97%)    PHYSICAL EXAM:  General: WDWN; Patient is in NAD, resting comfortably in bed  HEENT: NC/AT; PERRL, anicteric sclera; MMM  Neck: supple, no jvd, bandage over R chest where swan cl was removed w/o bleeding  Cardiovascular: +S1/S2, RRR  Respiratory: crackles bl at bases, apices clear, breathing comfortably and speaking full sentences, sitting up in bed, no increased WOB or tachypnea  Gastrointestinal: soft, NT/ND  Extremities: WWP; 1+ pitting edema at L, none on R (at baseline per pt), chronic discoloration on L shin  Neurological: AAOx3; no focal deficits    MEDICATIONS:  MEDICATIONS  (STANDING):  aMIOdarone    Tablet 200 milliGRAM(s) Oral daily  apixaban 5 milliGRAM(s) Oral every 12 hours  chlorhexidine 4% Liquid 1 Application(s) Topical once  digoxin     Tablet 0.125 milliGRAM(s) Oral daily  metoprolol tartrate 12.5 milliGRAM(s) Oral every 12 hours    MEDICATIONS  (PRN):      ALLERGIES:  Allergies    Zithromax (Unknown)    Intolerances        LABS:                        13.8   3.71  )-----------( 151      ( 20 Nov 2019 13:57 )             42.5     11-20    140  |  104  |  27<H>  ----------------------------<  100<H>  4.4   |  25  |  1.20    Ca    9.4      20 Nov 2019 13:57    TPro  7.4  /  Alb  4.5  /  TBili  0.6  /  DBili  x   /  AST  24  /  ALT  26  /  AlkPhos  73  11-20    PT/INR - ( 20 Nov 2019 13:57 )   PT: 16.1 sec;   INR: 1.41          PTT - ( 20 Nov 2019 13:57 )  PTT:34.6 sec    CAPILLARY BLOOD GLUCOSE          RADIOLOGY & ADDITIONAL TESTS: Reviewed.

## 2019-11-20 NOTE — PROGRESS NOTE ADULT - SUBJECTIVE AND OBJECTIVE BOX
TRANSFER ACCEPTANCE NOTE FOR CCU  79M with PMHx of pAfib s/p DCCV on 7/30/19 and s/p AICD on 8/8/19 with Dr. Ortiz (on Eliquis), chronic systolic CHF, bioprosthetic AVR and MVR ([facility] 12/2014), ROHAN (on CPAP), pHTN, and Lyme disease (on Acyclovir prophylactically), s/p TURP in 2004 who was admitted to [facility] for right-heart cath with placement of swan cl placement for evaluation of persistent ALCANTAR with minimal exertion. Associated, pt also notes orthopnea, fatigue, palpitations, and dizziness. Pt denies CP, syncope, PND, N/V, fever or chills, abd pain, or blood in the stool. Admitted to CCU s/p RHC with RIJ SGC in place. Currently, pt denied dyspnea at rest, chest pain, lightheadedness, fatigue, orthopnea, PND. Pt does endorse intermittent joint pain.     VITAL SIGNS:  Vital Signs Last 24 Hrs  T(C): 36.7 (20 Nov 2019 16:43), Max: 36.7 (20 Nov 2019 16:43)  T(F): 98 (20 Nov 2019 16:43), Max: 98 (20 Nov 2019 16:43)  HR: 70 (20 Nov 2019 17:00) (70 - 70)  BP: 93/65 (20 Nov 2019 16:08) (93/65 - 93/65)  BP(mean): 73 (20 Nov 2019 16:08) (73 - 73)  RR: 25 (20 Nov 2019 17:00) (12 - 25)  SpO2: 94% (20 Nov 2019 17:00) (94% - 94%)    PHYSICAL EXAM:  General: WDWN; Patient is in NAD  HEENT: NC/AT; PERRL, anicteric sclera; MMM  Neck: supple  Cardiovascular: +S1/S2, RRR  Respiratory: CTA B/L; no W/R/R  Gastrointestinal: soft, NT/ND; +BS  Extremities: WWP; trace edema at ankles  Vascular: 2+ radial and DP pulses B/L  Neurological: AAOx3; no focal deficits    MEDICATIONS:  MEDICATIONS  (STANDING):  aMIOdarone    Tablet 200 milliGRAM(s) Oral daily  apixaban 5 milliGRAM(s) Oral every 12 hours  chlorhexidine 4% Liquid 1 Application(s) Topical once  digoxin     Tablet 0.125 milliGRAM(s) Oral daily  metoprolol tartrate 12.5 milliGRAM(s) Oral every 12 hours    MEDICATIONS  (PRN):      ALLERGIES:  Allergies    Zithromax (Unknown)    Intolerances        LABS:                        13.8   3.71  )-----------( 151      ( 20 Nov 2019 13:57 )             42.5     11-20    140  |  104  |  27<H>  ----------------------------<  100<H>  4.4   |  25  |  1.20    Ca    9.4      20 Nov 2019 13:57    TPro  7.4  /  Alb  4.5  /  TBili  0.6  /  DBili  x   /  AST  24  /  ALT  26  /  AlkPhos  73  11-20    PT/INR - ( 20 Nov 2019 13:57 )   PT: 16.1 sec;   INR: 1.41          PTT - ( 20 Nov 2019 13:57 )  PTT:34.6 sec    CAPILLARY BLOOD GLUCOSE          RADIOLOGY & ADDITIONAL TESTS: Reviewed. TRANSFER ACCEPTANCE NOTE FOR CCU  79M with PMHx of pAfib s/p DCCV on 7/30/19 and s/p AICD on 8/8/19 with Dr. Ortiz (on Eliquis), chronic systolic CHF, bioprosthetic AVR and MVR ([facility] 12/2014), ROHAN (on CPAP), pHTN, and Lyme disease (on Acyclovir prophylactically), s/p TURP in 2004 who was admitted to [facility] for right-heart cath with placement of swan cl placement for evaluation of persistent ALCANTAR with minimal exertion (1/3 city block). Associated, pt also notes orthopnea, fatigue, palpitations, and dizziness. Pt denies CP, syncope, PND, N/V, fever or chills, abd pain, or blood in the stool. Admitted to CCU s/p RHC with RIJ SGC in place. Mean pulm pressure of 40 and PCWP of 13. Currently, pt denied dyspnea at rest, chest pain, lightheadedness, fatigue, orthopnea, PND. Pt does endorse intermittent joint pain.     VITAL SIGNS:  Vital Signs Last 24 Hrs  T(C): 36.7 (20 Nov 2019 16:43), Max: 36.7 (20 Nov 2019 16:43)  T(F): 98 (20 Nov 2019 16:43), Max: 98 (20 Nov 2019 16:43)  HR: 70 (20 Nov 2019 17:00) (70 - 70)  BP: 93/65 (20 Nov 2019 16:08) (93/65 - 93/65)  BP(mean): 73 (20 Nov 2019 16:08) (73 - 73)  RR: 25 (20 Nov 2019 17:00) (12 - 25)  SpO2: 94% (20 Nov 2019 17:00) (94% - 94%)    PHYSICAL EXAM:  General: WDWN; Patient is in NAD  HEENT: NC/AT; PERRL, anicteric sclera; MMM  Neck: supple  Cardiovascular: +S1/S2, RRR  Respiratory: CTA B/L; no W/R/R  Gastrointestinal: soft, NT/ND; +BS  Extremities: WWP; trace edema at ankles  Vascular: 2+ radial and DP pulses B/L  Neurological: AAOx3; no focal deficits    MEDICATIONS:  MEDICATIONS  (STANDING):  aMIOdarone    Tablet 200 milliGRAM(s) Oral daily  apixaban 5 milliGRAM(s) Oral every 12 hours  chlorhexidine 4% Liquid 1 Application(s) Topical once  digoxin     Tablet 0.125 milliGRAM(s) Oral daily  metoprolol tartrate 12.5 milliGRAM(s) Oral every 12 hours    MEDICATIONS  (PRN):      ALLERGIES:  Allergies    Zithromax (Unknown)    Intolerances        LABS:                        13.8   3.71  )-----------( 151      ( 20 Nov 2019 13:57 )             42.5     11-20    140  |  104  |  27<H>  ----------------------------<  100<H>  4.4   |  25  |  1.20    Ca    9.4      20 Nov 2019 13:57    TPro  7.4  /  Alb  4.5  /  TBili  0.6  /  DBili  x   /  AST  24  /  ALT  26  /  AlkPhos  73  11-20    PT/INR - ( 20 Nov 2019 13:57 )   PT: 16.1 sec;   INR: 1.41          PTT - ( 20 Nov 2019 13:57 )  PTT:34.6 sec    CAPILLARY BLOOD GLUCOSE          RADIOLOGY & ADDITIONAL TESTS: Reviewed.

## 2019-11-20 NOTE — CONSULT NOTE ADULT - ATTENDING COMMENTS
78 yo man with chronic systolic CHF (EF 20-25% by Echo 4/2019), bioprosthetic AVR and MVR ([facility] 12/2014), ROHAN (on CPAP), pHTN, and Lyme disease (on Acyclovir prophylactically),   s/p RHC with [facility] SGC in place. Patient states he has been having worsening fatigue and ALCANTAR. Patient says his symptoms are variable and don't occur every day. Some days he is able to walk and go to the gym without limitations. Other days, like today, he needs to stop multiple times to walk one block. He says besides SOB, he will have joint pain and fatigue which also limit his mobility. He tries taking Advil for the pain, which he says work. Patient does not have symptoms of orthopnea or PND. He sleeps on 2-3 pillows for comfort, not due to SOB while lying flat. Patient currently denying CP, SOB, palpitations, abdominal pain.   80yo Male with PMHx of pAfib s/p DCCV on 7/30/19 and s/p AICD on 8/8/19 with Dr. Ortiz (on Eliquis, last dose__11/2019__), chronic systolic CHF (EF 20-25% by Echo 4/2019), bioprosthetic AVR and MVR ([facility] 12/2014), ROHAN (on CPAP), pHTN, and Lyme disease (on Acyclovir prophylactically), s/p TURP in 2004 who presented to Dr. Zuñiga for routine CHF follow up. Patient with worsening dyspnea and fatigue. Patient admitted for RHC with possible trial of milrinone if decompensated left sided HF. RHC does not suggest decompensation. No need for inotropic support at this time.   RA 4 with V wave to 10  RV 35/5  PA 35/16 mean 23  Wedge 13 (no v wave)  PA sat 59, Ao sat 90  CO 5.1L, CI 2.3  PVR and PVRI normal  SVF 1067      Dyspnea/Fatigue: May be multifactorial. 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 Patient currently well compensated from CHF standpoint. No role for milrinone therapy at this time.   -Continue with GDMT for CHF  -Continue Toprol XL 25 daily, Entresto 24-26 BID  -Continue with Lasix 20mg PO.   -Patient should have pulmonary evaluation for possible cause of dyspnea.   -Will rewedge SGC in AM. If numbers remain stable, will remove.     Afib: Currently rate controlled. [id number]=7.  -Continue with Toprol, Eliquis, Amiodarone and Digoxin.     Case discussed at length with cardiology fellows  AMIE Kat

## 2019-11-20 NOTE — CONSULT NOTE ADULT - ASSESSMENT
78yo Male with PMHx of pAfib s/p DCCV on 7/30/19 and s/p AICD on 8/8/19 with Dr. Ortiz (on Eliquis, last dose__11/2019__), chronic systolic CHF (EF 20-25% by Echo 4/2019), bioprosthetic AVR and MVR ([facility] 12/2014), ROHAN (on CPAP), pHTN, and Lyme disease (on Acyclovir prophylactically), s/p TURP in 2004 who presented to Dr. Zuñiga for routine CHF follow up. Patient with worsening dyspnea and fatigue. Patient admitted for RHC with possible trial of milrinone if decompensated left sided HF. RHC does not suggest decompensation. No need for inotropic support at this time.     Dyspnea/Fatigue: May be multifactorial. 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 Patient currently well compensated from CHF standpoint. No role for milrinone therapy at this time.   -Continue with GDMT for CHF  -Continue Toprol XL 25 daily, Entresto 24-26 BID  -Continue with Lasix 20mg PO.   -Patient should have pulmonary work up for possible cause of dyspnea.   -Will rewedge SGC in AM. If numbers remain stable, will remove.     Afib: Currently rate controlled. [id number]=4.  -Continue with Toprol, Eliquis, Amiodarone and Digoxin.     Discussed with Dr. Kat and CCU team  Will see tomorrow

## 2019-11-20 NOTE — PROGRESS NOTE ADULT - ASSESSMENT
79M with PMHx of pAfib s/p DCCV on 7/30/19 and s/p AICD on 8/8/19 with Dr. Ortiz (on Eliquis), chronic systolic CHF, bioprosthetic AVR and MVR ([facility] 12/2014), ROHAN (on CPAP), pHTN, and Lyme disease (on Acyclovir prophylactically), s/p TURP admitted to [facility] for persistent ALCANTAR s/p RHC with RIJ SGC in place.     CVS  #Dyspnea/Fatigue:   May be multifactorial. 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 Patient currently well compensated from CHF standpoint. No role for milrinone therapy at this time.   -Continue with GDMT for CHF  -Continue Toprol XL 25 daily,   -holding Entresto 24-26 BID as pt s/p contrast from cath  -holding Lasix 20mg PO as pt s/p contrast  -Will rewedge SGC in AM. If numbers remain stable, will remove.     #Afib:   Currently rate controlled. [id number]=3  -Continue with Toprol, Eliquis, Amiodarone and Digoxin    #s/p MV and AV bioprosthetic replacements in 2015:  -Will obtain echo to reevaluate function    INFECTIOUS DISEASE  #chronic lyme  -pt on outpatient acyclovir  -holding in setting of contrast    F: none  E: replete electrolytes K< 4, Mg <2  N: DASH/TLC diet  DVT PPx: Lovenox  Code status: Full Code 79M with PMHx of pAfib s/p DCCV on 7/30/19 and s/p AICD on 8/8/19 with Dr. Ortiz (on Eliquis), chronic systolic CHF, bioprosthetic AVR and MVR ([facility] 12/2014), ROHAN (on CPAP), pHTN, and Lyme disease (on Acyclovir prophylactically), s/p TURP admitted to [facility] for persistent ALCANTAR s/p RHC with RIJ SGC in place, now removed.

## 2019-11-20 NOTE — PROGRESS NOTE ADULT - PROBLEM SELECTOR PLAN 8
F: none  E: replete electrolytes K< 4, Mg <2  N: DASH/TLC diet  DVT PPx on apixaban  Code status: Full Code

## 2019-11-20 NOTE — CONSULT NOTE ADULT - SUBJECTIVE AND OBJECTIVE BOX
HPI:  78yo Male with PMHx of pAfib s/p DCCV on 7/30/19 and s/p AICD on 8/8/19 with Dr. Ortiz (on Eliquis, last dose__11/2019__), chronic systolic CHF (EF 20-25% by Echo 4/2019), bioprosthetic AVR and MVR ([facility] 12/2014), ROHAN (on CPAP), pHTN, and Lyme disease (on Acyclovir prophylactically), s/p TURP in 2004 who presented to Dr. Zuñiga for routine CHF follow up. He endorses persistent ALCANTAR with minimal exertion and 2 pillow orthopnea. Pt also endorses LE edema. Pt states that occasionally he has palpitations and dizziness. Pt claims that he has been feeling more fatigued than usual. He reports he gained weight, but has been eating more. Pt denies CP, syncope, PND, N/V, fever or chills, blood in the stool.   In light of pts risk factors, persistent CCS class _4_ anginal symptoms and poorly managed CHF, pt now presents to [facility] for recommended Right heart catheterization with [place] placement and to be admitted to CCU post cath for further management of CHF and possible milrinone initiation. (19 Nov 2019 15:41)    Patient is now s/p RHC with [facility] SGC in place. Patient states he has been having worsening fatigue and ALCANTAR. Patient says his symptoms are variable and don't occur every day. Some days he is able to walk and go to the gym without limitations. Other days, like today, he needs to stop multiple times to walk one block. He says besides SOB, he will have joint pain and fatigue which also limit his mobility. He tries taking Advil for the pain, which he says work. Patient does not have symptoms of orthopnea or PND. He sleeps on 2-3 pillows for comfort, not due to SOB while lying flat. Patient currently denying CP, SOB, palpitations, abdominal pain.       ROS: A 10-point review of systems was otherwise negative.    PAST MEDICAL & SURGICAL HISTORY:  Lyme disease  GERD (gastroesophageal reflux disease)  Basal cell carcinoma  ROHAN on CPAP  H/O pulmonary hypertension  S/P mitral valve replacement  S/P aortic valve replacement  History of abdominal aortic aneurysm (AAA)  Congestive heart failure (CHF)  Atrial fibrillation  Varicose veins of lower extremities with complications: Varicose veins of both legs with edema  H/O right inguinal hernia repair  History of heart valve replacement: bioprosthetic aortic and mitral valve,  S/P TURP (status post transurethral resection of prostate)  History of total knee replacement: S/P knee replacement, left      SOCIAL HISTORY: Denies smoking, EtOH or drug use.   FAMILY HISTORY:      ALLERGIES: 	  Zithromax (Unknown)            MEDICATIONS:  chlorhexidine 4% Liquid 1 Application(s) Topical once      PHYSICAL EXAM:  T(C): 36.7 (11-20-19 @ 16:43), Max: 36.7 (11-20-19 @ 16:43)  HR: 70 (11-20-19 @ 16:08) (70 - 70)  BP: 93/65 (11-20-19 @ 16:08) (93/65 - 93/65)  RR: 12 (11-20-19 @ 16:08) (12 - 12)  SpO2: 94% (11-20-19 @ 16:08) (94% - 94%)  Wt(kg): --    GEN: Awake, comfortable. NAD.   HEENT: NCAT, PERRL, EOMI. Mucosa moist. No JVD. RIJ swan cl catheter in place.   RESP: CTA b/l  CV: RRR, normal s1/s2. No m/r/g.  ABD: Soft, NTND. BS+  EXT: Warm. Trace pitting edema of left ankle.   NEURO: AAOx3. No focal deficits.    I&O's Summary    Height (cm): 190.5 (11-20 @ 16:08)  Weight (kg): 96.8 (11-20 @ 16:08)  BMI (kg/m2): 26.7 (11-20 @ 16:08)  BSA (m2): 2.26 (11-20 @ 16:08)  	  LABS:	 	    CARDIAC MARKERS:                                  13.8   3.71  )-----------( 151      ( 20 Nov 2019 13:57 )             42.5     11-20    140  |  104  |  27<H>  ----------------------------<  100<H>  4.4   |  25  |  1.20    Ca    9.4      20 Nov 2019 13:57    TPro  7.4  /  Alb  4.5  /  TBili  0.6  /  DBili  x   /  AST  24  /  ALT  26  /  AlkPhos  73  11-20    proBNP:   Lipid Profile:   HgA1c: Hemoglobin A1C, Whole Blood: 5.8 % (11-20 @ 13:57)    TSH:     TELEMETRY: Paced rhythm	    ECG:  	    ECHO:4/2019: Global LV hypokinesis. EF 20-25%. Bioprosthetic MVR with minimal MR. Bioprosthetic AVR no AI. Mild concentric LVH. Severe reversible diastolic dysfunction. PASP 60mmHg.   CATH: RHC: RA 4 with V wave to 10, RV 35/5, PA 35/16 (mean 23), Wedge 13. PA sat 59%, Ao sat 90%. CO 5.1L, CI 2.3L. PVR and PVRI normal. SVR 1067.

## 2019-11-20 NOTE — PROGRESS NOTE ADULT - ASSESSMENT
79M with PMHx of pAfib s/p DCCV on 7/30/19 and s/p AICD on 8/8/19 with Dr. Ortiz (on Eliquis), chronic systolic CHF, bioprosthetic AVR and MVR ([facility] 12/2014), ROHAN (on CPAP), pHTN, and Lyme disease (on Acyclovir prophylactically), s/p TURP admitted to [facility] for persistent ALCANTAR s/p RHC with RIJ SGC in place.     CVS  #Dyspnea/Fatigue:   May be multifactorial. 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 Patient currently well compensated from CHF standpoint. No role for milrinone therapy at this time.   -Continue with GDMT for CHF  -Continue Toprol XL 25 daily,   -holding Entresto 24-26 BID as pt s/p contrast from cath  -holding Lasix 20mg PO as pt s/p contrast  -Will rewedge SGC in AM. If numbers remain stable, will remove.     #Afib:   Currently rate controlled. [id number]=1  -Continue with Toprol, Eliquis, Amiodarone and Digoxin    #s/p MV and AV bioprosthetic replacements in 2015:  -Will obtain echo to reevaluate function    #HTN:  -c/w metoprolol  -holding entresto in setting of recent contrast load    INFECTIOUS DISEASE  #chronic lyme  -pt on outpatient acyclovir  -will continue as inpatient    F: none  E: replete electrolytes K< 4, Mg <2  N: DASH/TLC diet  DVT PPx: Lovenox  Code status: Full Code 79M with PMHx of pAfib s/p DCCV on 7/30/19 and s/p AICD on 8/8/19 with Dr. Ortiz (on Eliquis), chronic systolic CHF, bioprosthetic AVR and MVR ([facility] 12/2014), ROHAN (on CPAP), pHTN, and Lyme disease (on Acyclovir prophylactically), s/p TURP admitted to [facility] for persistent ALCANTAR s/p RHC with RIJ SGC in place.     CVS  #Dyspnea/Fatigue:   May be multifactorial. 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 Patient currently well compensated from CHF standpoint. No role for milrinone therapy at this time.   -Continue with GDMT for CHF  -Continue Toprol XL 25 daily,   -holding Entresto 24-26 BID as pt s/p contrast from cath  -holding Lasix 20mg PO as pt s/p contrast  -Will rewedge SGC in AM. If numbers remain stable, will remove.     #Afib:   Currently rate controlled. [id number]=3  -Continue with Toprol, Eliquis, Amiodarone and Digoxin    #s/p MV and AV bioprosthetic replacements in 2015:  -Will obtain echo to reevaluate function    INFECTIOUS DISEASE  #chronic lyme  -pt on outpatient acyclovir  -will continue as inpatient    F: none  E: replete electrolytes K< 4, Mg <2  N: DASH/TLC diet  DVT PPx: Lovenox  Code status: Full Code 79M with PMHx of pAfib s/p DCCV on 7/30/19 and s/p AICD on 8/8/19 with Dr. Ortiz (on Eliquis), chronic systolic CHF, bioprosthetic AVR and MVR ([facility] 12/2014), ROHAN (on CPAP), pHTN, and Lyme disease (on Acyclovir prophylactically), s/p TURP admitted to [facility] for persistent ALCANTAR s/p RHC with RIJ SGC in place.     CVS  #Dyspnea/Fatigue:   May be multifactorial. 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 Patient currently well compensated from CHF standpoint. No role for milrinone therapy at this time.   -Continue with GDMT for CHF  -Continue Toprol XL 25 daily,   -holding Entresto 24-26 BID as pt s/p contrast from cath  -holding Lasix 20mg PO as pt s/p contrast  -Will rewedge SGC in AM. If numbers remain stable, will remove.     #Afib:   Currently rate controlled. [id number]=9  -Continue with Toprol, Eliquis, Amiodarone and Digoxin    #s/p MV and AV bioprosthetic replacements in 2015:  -Will obtain echo to reevaluate function    INFECTIOUS DISEASE  #chronic lyme  -pt on outpatient acyclovir  -holding in setting of contrast    F: none  E: replete electrolytes K< 4, Mg <2  N: DASH/TLC diet  DVT PPx: Lovenox  Code status: Full Code

## 2019-11-20 NOTE — PROGRESS NOTE ADULT - PROBLEM SELECTOR PLAN 3
#Afib:   Currently rate controlled. [id number]=0  -Continue with Toprol, Eliquis, Amiodarone and Digoxin  -tele monitoring

## 2019-11-21 ENCOUNTER — INBOUND DOCUMENT (OUTPATIENT)
Age: 79
End: 2019-11-21

## 2019-11-21 ENCOUNTER — TRANSCRIPTION ENCOUNTER (OUTPATIENT)
Age: 79
End: 2019-11-21

## 2019-11-21 VITALS — TEMPERATURE: 97 F

## 2019-11-21 LAB
ANION GAP SERPL CALC-SCNC: 8 MMOL/L — SIGNIFICANT CHANGE UP (ref 5–17)
BUN SERPL-MCNC: 25 MG/DL — HIGH (ref 7–23)
CALCIUM SERPL-MCNC: 8.4 MG/DL — SIGNIFICANT CHANGE UP (ref 8.4–10.5)
CHLORIDE SERPL-SCNC: 106 MMOL/L — SIGNIFICANT CHANGE UP (ref 96–108)
CO2 SERPL-SCNC: 23 MMOL/L — SIGNIFICANT CHANGE UP (ref 22–31)
CREAT SERPL-MCNC: 1.05 MG/DL — SIGNIFICANT CHANGE UP (ref 0.5–1.3)
DIGOXIN SERPL-MCNC: 0.5 NG/ML — LOW (ref 0.8–2)
GLUCOSE SERPL-MCNC: 94 MG/DL — SIGNIFICANT CHANGE UP (ref 70–99)
HCT VFR BLD CALC: 38.4 % — LOW (ref 39–50)
HGB BLD-MCNC: 12.7 G/DL — LOW (ref 13–17)
MAGNESIUM SERPL-MCNC: 2.2 MG/DL — SIGNIFICANT CHANGE UP (ref 1.6–2.6)
MCHC RBC-ENTMCNC: 31.7 PG — SIGNIFICANT CHANGE UP (ref 27–34)
MCHC RBC-ENTMCNC: 33.1 GM/DL — SIGNIFICANT CHANGE UP (ref 32–36)
MCV RBC AUTO: 95.8 FL — SIGNIFICANT CHANGE UP (ref 80–100)
NRBC # BLD: 0 /100 WBCS — SIGNIFICANT CHANGE UP (ref 0–0)
PHOSPHATE SERPL-MCNC: 4.1 MG/DL — SIGNIFICANT CHANGE UP (ref 2.5–4.5)
PLATELET # BLD AUTO: 138 K/UL — LOW (ref 150–400)
POTASSIUM SERPL-MCNC: 4 MMOL/L — SIGNIFICANT CHANGE UP (ref 3.5–5.3)
POTASSIUM SERPL-SCNC: 4 MMOL/L — SIGNIFICANT CHANGE UP (ref 3.5–5.3)
RBC # BLD: 4.01 M/UL — LOW (ref 4.2–5.8)
RBC # FLD: 14.6 % — HIGH (ref 10.3–14.5)
SODIUM SERPL-SCNC: 137 MMOL/L — SIGNIFICANT CHANGE UP (ref 135–145)
WBC # BLD: 4.44 K/UL — SIGNIFICANT CHANGE UP (ref 3.8–10.5)
WBC # FLD AUTO: 4.44 K/UL — SIGNIFICANT CHANGE UP (ref 3.8–10.5)

## 2019-11-21 PROCEDURE — 99231 SBSQ HOSP IP/OBS SF/LOW 25: CPT | Mod: GC

## 2019-11-21 PROCEDURE — 99238 HOSP IP/OBS DSCHRG MGMT 30/<: CPT

## 2019-11-21 PROCEDURE — 71045 X-RAY EXAM CHEST 1 VIEW: CPT | Mod: 26

## 2019-11-21 RX ADMIN — APIXABAN 5 MILLIGRAM(S): 2.5 TABLET, FILM COATED ORAL at 10:38

## 2019-11-21 RX ADMIN — Medication 0.12 MILLIGRAM(S): at 10:37

## 2019-11-21 RX ADMIN — Medication 12.5 MILLIGRAM(S): at 10:38

## 2019-11-21 RX ADMIN — AMIODARONE HYDROCHLORIDE 200 MILLIGRAM(S): 400 TABLET ORAL at 10:38

## 2019-11-21 NOTE — DISCHARGE NOTE PROVIDER - CARE PROVIDER_API CALL
Bowen Zuñiga)  Cardiovascular Disease; Internal Medicine  [address], [address]  [place] [address]  Phone: [phone]  Fax: [phone]  Established Patient  Follow Up Time: 1 week    Aminta Rice)  Critical Care Medicine; Pulmonary Disease  [address], [address] [place] [address]  Phone: [phone]  Fax: [phone]  Follow Up Time: 2 weeks Bowen Zuñiga)  Cardiovascular Disease; Internal Medicine  [address], [address]  [place] [address]  Phone: [phone]  Fax: [phone]  Established Patient  Follow Up Time: 1 week    Aminta Rice)  Critical Care Medicine; Pulmonary Disease  [address], [address] [place] [address]  Phone: [phone]  Fax: [phone]  Follow Up Time: 2 weeks    Sabine Kat)  Cardiology; Cardiovascular Disease  [address] [address]  Phone: [phone]  Fax: [phone]  Established Patient  Follow Up Time: Routine

## 2019-11-21 NOTE — DISCHARGE NOTE PROVIDER - NSDCMRMEDTOKEN_GEN_ALL_CORE_FT
acyclovir 400 mg oral tablet: 1 tab(s) orally once a day  amiodarone 200 mg oral tablet: 1 tab(s) orally once a day  apixaban 5 mg oral tablet: 1 tab(s) orally 2 times a day  digoxin 125 mcg (0.125 mg) oral tablet: 1 tab(s) orally once a day  Entresto 24 mg-26 mg oral tablet: 1 tab(s) orally 2 times a day  furosemide 20 mg oral tablet: 1 tab(s) orally 2 times a day  levOCARNitine 330 mg oral tablet: orally once a day  Metoprolol Tartrate 25 mg oral tablet: 0.5 tab(s) orally 2 times a day

## 2019-11-21 NOTE — PROGRESS NOTE ADULT - ATTENDING COMMENTS
78yo Male with PMHx of pAfib s/p DCCV on 7/30/19 and s/p AICD on 8/8/19 with Dr. Ortiz (on Eliquis, last dose__11/2019__), chronic systolic CHF (EF 20-25% by Echo 4/2019), bioprosthetic AVR and MVR ([facility] 12/2014), ROHAN (on CPAP), pHTN, and Lyme disease (on Acyclovir prophylactically), s/p TURP in 2004 who presented to Dr. Zuñiga for routine CHF follow up. Patient with worsening dyspnea and fatigue. Patient admitted for RHC with possible trial of milrinone if decompensated left sided HF.   RA 4 with V wave to 10  RV 35/5  PA 35/16 mean 23  Wedge 13 (no v wave)  PA sat 59, Ao sat 90  CO 5.1L, CI 2.3  PVR and PVRI normal  SVF 1067    RHC does not suggest decompensation. No need for inotropic support at this time.     Dyspnea/Fatigue: May be multifactorial. 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 Patient currently well compensated from CHF standpoint. No role for milrinone therapy at this time.   -Continue with GDMT for CHF  -Continue Toprol XL 25 daily, Entresto 24-26 BID (if BP allows)  -Continue with Lasix 20mg PO.   -Patient admits to use of NSAIDs for symptoms at times, advised not to use as could worsen cardiac function.   -Patient should have pulmonary work up for possible cause of dyspnea.   -Patient to follow up with Dr. Zuñiga as outpatient.     Afib: Currently rate controlled. [id number]=6.  -Continue with Toprol, Eliquis, Amiodarone and Digoxin.         I have personally provided 30 minutes of critical care time concurrently with the fellow.  I have reviewed the fellow’s documentation and I agree with the fellow's assessment and plan of care.  AMIE Kat

## 2019-11-21 NOTE — DISCHARGE NOTE PROVIDER - NSDCCPCAREPLAN_GEN_ALL_CORE_FT
PRINCIPAL DISCHARGE DIAGNOSIS  Diagnosis: ALCANTAR (dyspnea on exertion)  Assessment and Plan of Treatment: You came into the hospital for an elective Right cardiac catheterization to evaluate the hemodynamics of the heart and were relatively normal. Please follow up with Dr Zuñiga and Pulmonologist Dr Rice for further evaluation of your shortness of breath on exertion. PRINCIPAL DISCHARGE DIAGNOSIS  Diagnosis: ALCANTAR (dyspnea on exertion)  Assessment and Plan of Treatment: You came into the hospital for an elective Right Heart Catheterization to evaluate the hemodynamics of the heart and the results were relatively normal. Please follow up with Dr Zuñiga and Pulmonologist Dr Rice for further evaluation of your shortness of breath on exertion.   -You may develops some mild black and blue at the right neck site, which is normal. Monitor the Right neck access site for any swelling, drainage, oozing, redness, increased pain, fever, or chills. PRINCIPAL DISCHARGE DIAGNOSIS  Diagnosis: ALCANTAR (dyspnea on exertion)  Assessment and Plan of Treatment: You came into the hospital for an elective Right Heart Catheterization to evaluate the hemodynamics of the heart and the results were relatively normal. Please follow up with Dr Zuñiga and Pulmonologist Dr Rice for further evaluation of your shortness of breath on exertion.   -You may develops some mild black and blue at the right neck site, which is normal. Monitor the Right neck access site for any swelling, drainage, oozing, redness, increased pain, fever, or chills.      SECONDARY DISCHARGE DIAGNOSES  Diagnosis: CHF (congestive heart failure), NYHA class II, chronic, systolic  Assessment and Plan of Treatment: You may resume your outpatient regimen of Entresto, Lasix, and Metoprolol.

## 2019-11-21 NOTE — DISCHARGE NOTE NURSING/CASE MANAGEMENT/SOCIAL WORK - NURSING SECTION COMPLETE
What Is The Reason For Today's Visit?: Full Body Skin Examination What Is The Reason For Today's Visit? (Being Monitored For X): concerning skin lesions on an annual basis How Severe Are Your Spot(S)?: mild Patient/Caregiver provided printed discharge information.

## 2019-11-21 NOTE — DISCHARGE NOTE NURSING/CASE MANAGEMENT/SOCIAL WORK - PATIENT PORTAL LINK FT
You can access the FollowMyHealth Patient Portal offered by [facility] by registering at the following website: [email or web address]. By joining [employer]’s FollowMyHealth portal, you will also be able to view your health information using other applications (apps) compatible with our system.

## 2019-11-21 NOTE — PROGRESS NOTE ADULT - ASSESSMENT
80yo Male with PMHx of pAfib s/p DCCV on 7/30/19 and s/p AICD on 8/8/19 with Dr. Ortiz (on Eliquis, last dose__11/2019__), chronic systolic CHF (EF 20-25% by Echo 4/2019), bioprosthetic AVR and MVR ([facility] 12/2014), ROHAN (on CPAP), pHTN, and Lyme disease (on Acyclovir prophylactically), s/p TURP in 2004 who presented to Dr. Zuñiga for routine CHF follow up. Patient with worsening dyspnea and fatigue. Patient admitted for RHC with possible trial of milrinone if decompensated left sided HF. RHC does not suggest decompensation. No need for inotropic support at this time.     Dyspnea/Fatigue: May be multifactorial. 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 Patient currently well compensated from CHF standpoint. No role for milrinone therapy at this time.   -Continue with GDMT for CHF  -Continue Toprol XL 25 daily, Entresto 24-26 BID (if BP allows)  -Continue with Lasix 20mg PO.   -Patient admits to use of NSAIDs for symptoms at times, advised not to use as could worsen cardiac function.   -Patient should have pulmonary work up for possible cause of dyspnea.   -Patient can follow up with Dr. Kat as outpatient.     Afib: Currently rate controlled. [id number].  -Continue with Toprol, Eliquis, Amiodarone and Digoxin.     Discussed with Dr. Kat and CCU team

## 2019-11-21 NOTE — DISCHARGE NOTE PROVIDER - CARE PROVIDERS DIRECT ADDRESSES
,[email or web address]

## 2019-11-21 NOTE — DISCHARGE NOTE PROVIDER - HOSPITAL COURSE
79M with PMHx of pAfib s/p DCCV on 7/30/19 and s/p AICD on 8/8/19 with Dr. Ortiz (on Eliquis), chronic systolic CHF (EF 20-25% on echo 4/2019), bioprosthetic AVR and MVR ([facility] 12/2014), ROHAN (on CPAP), pHTN, and Lyme disease 10yrs ago(on Acyclovir prophylactically), s/p TURP in 2004 who was admitted to [facility] for right-heart cath with placement of swan cl placement for evaluation of persistent ALCANTAR with minimal exertion (1/3 city block). Admitted to CCU s/p RHC with RIJ SGC in place. RHC: RA 4 with V wave to 10, RV 35/5, PA 35/16 (mean 23), Wedge 13. PA sat 59%, Ao sat 90%. CO 5.1L, CI 2.3L. PVR and PVRI normal. SVR 1067. Stepped down to tele 5 Lachman s/p RIJ SGC removed in stable condition. Pt remains asymptomatic, RIJ site CDI w/o hematoma. Entresto was held inhospital for borderline hypotension SBP 80-90s, to be resumed as outpatient.        On the day of discharge, the patient was seen and examined. Symptoms improved. Vital signs are stable. Labs and imaging reviewed. Patient is medically optimized and hemodynamically stable. Return precautions discussed, medication teach back done, and importance of physician followup emphasized. The patient verbalized understanding.            ECHO 4/2019: Global LV hypokinesis. EF 20-25%. Bioprosthetic MVR with minimal MR. Bioprosthetic AVR no AI. Mild concentric LVH. Severe reversible diastolic dysfunction. PASP 60mmHg. 79M with PMHx of pAfib s/p DCCV on 7/30/19 and s/p AICD on 8/8/19 with Dr. Ortiz (on Eliquis), chronic systolic CHF (EF 20-25% on echo 4/2019), bioprosthetic AVR and MVR ([facility] 12/2014), ROHAN (on CPAP), pHTN, and Lyme disease 10yrs ago(on Acyclovir prophylactically), s/p TURP in 2004 who was admitted to [facility] for elective right-heart cath with placement of swan cl placement for evaluation of persistent ACLANTAR with minimal exertion (1/3 city block). Admitted to CCU s/p RHC with RIJ SGC in place. RHC: RA 4 with V wave to 10, RV 35/5, PA 35/16 (mean 23), Wedge 13. PA sat 59%, Ao sat 90%. CO 5.1L, CI 2.3L. PVR and PVRI normal. SVR 1067. Stepped down to tele 5 Lachman s/p RIJ SGC removed in stable condition. Pt remains asymptomatic, RIJ site CDI w/o hematoma/oozing. Entresto was held inhospital for borderline hypotension SBP 80-90s, to be resumed as outpatient.        On the day of discharge, the patient was seen and examined. Symptoms improved. Vital signs are stable. Labs and imaging reviewed. Patient is medically optimized and hemodynamically stable. Return precautions discussed, medication teach back done, and importance of physician followup emphasized. The patient verbalized understanding.        ECHO 4/2019: Global LV hypokinesis. EF 20-25%. Bioprosthetic MVR with minimal MR. Bioprosthetic AVR no AI. Mild concentric LVH. Severe reversible diastolic dysfunction. PASP 60mmHg.

## 2019-11-21 NOTE — PROGRESS NOTE ADULT - SUBJECTIVE AND OBJECTIVE BOX
INTERVAL EVENTS: Patient feeling well today. Slept well. SGC removed overnight and patient stepped down to telemetry unit. Patient denies CP, SOB, palpitations.     PAST MEDICAL & SURGICAL HISTORY:  Lyme disease  GERD (gastroesophageal reflux disease)  Basal cell carcinoma  ROHAN on CPAP  H/O pulmonary hypertension  S/P mitral valve replacement  S/P aortic valve replacement  History of abdominal aortic aneurysm (AAA)  Congestive heart failure (CHF)  Atrial fibrillation  Varicose veins of lower extremities with complications: Varicose veins of both legs with edema  H/O right inguinal hernia repair  History of heart valve replacement: bioprosthetic aortic and mitral valve,  S/P TURP (status post transurethral resection of prostate)  History of total knee replacement: S/P knee replacement, left  Other postprocedural status: 10 yr ago      MEDICATIONS  (STANDING):  aMIOdarone    Tablet 200 milliGRAM(s) Oral daily  apixaban 5 milliGRAM(s) Oral every 12 hours  digoxin     Tablet 0.125 milliGRAM(s) Oral daily  metoprolol tartrate 12.5 milliGRAM(s) Oral every 12 hours    MEDICATIONS  (PRN):      Vital Signs Last 24 Hrs  T(C): 36.6 (21 Nov 2019 10:00), Max: 36.9 (21 Nov 2019 05:15)  T(F): 97.8 (21 Nov 2019 10:00), Max: 98.4 (21 Nov 2019 05:15)  HR: 70 (21 Nov 2019 09:00) (70 - 90)  BP: 105/70 (21 Nov 2019 09:00) (87/69 - 105/74)  BP(mean): 79 (21 Nov 2019 09:00) (68 - 91)  RR: 24 (21 Nov 2019 09:00) (12 - 35)  SpO2: 96% (21 Nov 2019 06:07) (90% - 97%)     PHYSICAL EXAM:  GEN: Awake, alert. NAD.   HEENT: NCAT, PERRL, EOMI. Mucosa moist. No JVD.  RESP: CTA b/l  CV: RRR. Normal S1/S2. No m/r/g.  ABD: Soft. NT/ND. BS+  EXT: Warm. Trace pitting edema of left ankle.    NEURO: AAOx3. No focal deficits.     LABS:                        12.7   4.44  )-----------( 138      ( 21 Nov 2019 06:35 )             38.4     11-21    137  |  106  |  25<H>  ----------------------------<  94  4.0   |  23  |  1.05    Ca    8.4      21 Nov 2019 06:35  Phos  4.1     11-21  Mg     2.2     11-21    TPro  7.4  /  Alb  4.5  /  TBili  0.6  /  DBili  x   /  AST  24  /  ALT  26  /  AlkPhos  73  11-20    CARDIAC MARKERS ( 20 Nov 2019 13:57 )  x     / x     / 113 U/L / x     / 3.6 ng/mL      PT/INR - ( 20 Nov 2019 13:57 )   PT: 16.1 sec;   INR: 1.41          PTT - ( 20 Nov 2019 13:57 )  PTT:34.6 sec    I&O's Summary    20 Nov 2019 07:01  -  21 Nov 2019 07:00  --------------------------------------------------------  IN: 200 mL / OUT: 450 mL / NET: -250 mL    21 Nov 2019 07:01  -  21 Nov 2019 10:12  --------------------------------------------------------  IN: 180 mL / OUT: 200 mL / NET: -20 mL      BNP  RADIOLOGY & ADDITIONAL STUDIES:    TELEMETRY: AV paced.

## 2019-11-21 NOTE — DISCHARGE NOTE PROVIDER - PROVIDER TOKENS
PROVIDER:[TOKEN:[[id number]],FOLLOWUP:[1 week],ESTABLISHEDPATIENT:[T]],PROVIDER:[TOKEN:[[id number]],FOLLOWUP:[2 weeks]] PROVIDER:[TOKEN:[[id number]],FOLLOWUP:[1 week],ESTABLISHEDPATIENT:[T]],PROVIDER:[TOKEN:[[id number]],FOLLOWUP:[2 weeks]],PROVIDER:[TOKEN:[[id number]],FOLLOWUP:[Routine],ESTABLISHEDPATIENT:[T]]

## 2019-11-22 PROBLEM — Z86.79 PERSONAL HISTORY OF OTHER DISEASES OF THE CIRCULATORY SYSTEM: Chronic | Status: ACTIVE | Noted: 2019-11-19

## 2019-11-22 PROBLEM — C44.91 BASAL CELL CARCINOMA OF SKIN, UNSPECIFIED: Chronic | Status: ACTIVE | Noted: 2019-11-19

## 2019-11-22 PROBLEM — Z95.2 PRESENCE OF PROSTHETIC HEART VALVE: Chronic | Status: ACTIVE | Noted: 2019-11-19

## 2019-11-22 PROBLEM — G47.33 OBSTRUCTIVE SLEEP APNEA (ADULT) (PEDIATRIC): Chronic | Status: ACTIVE | Noted: 2019-11-19

## 2019-11-22 PROBLEM — A69.20 LYME DISEASE, UNSPECIFIED: Chronic | Status: ACTIVE | Noted: 2019-11-19

## 2019-11-22 PROBLEM — K21.9 GASTRO-ESOPHAGEAL REFLUX DISEASE WITHOUT ESOPHAGITIS: Chronic | Status: ACTIVE | Noted: 2019-11-19

## 2019-11-26 ENCOUNTER — APPOINTMENT (OUTPATIENT)
Dept: HEART AND VASCULAR | Facility: CLINIC | Age: 79
End: 2019-11-26
Payer: MEDICARE

## 2019-11-26 ENCOUNTER — NON-APPOINTMENT (OUTPATIENT)
Age: 79
End: 2019-11-26

## 2019-11-26 VITALS
DIASTOLIC BLOOD PRESSURE: 62 MMHG | SYSTOLIC BLOOD PRESSURE: 101 MMHG | OXYGEN SATURATION: 92 % | HEIGHT: 72 IN | BODY MASS INDEX: 28.85 KG/M2 | HEART RATE: 82 BPM | WEIGHT: 213 LBS

## 2019-11-26 VITALS
DIASTOLIC BLOOD PRESSURE: 76 MMHG | WEIGHT: 214 LBS | HEART RATE: 73 BPM | BODY MASS INDEX: 28.99 KG/M2 | OXYGEN SATURATION: 98 % | SYSTOLIC BLOOD PRESSURE: 120 MMHG | HEIGHT: 72 IN

## 2019-11-26 DIAGNOSIS — Z79.899 OTHER LONG TERM (CURRENT) DRUG THERAPY: ICD-10-CM

## 2019-11-26 DIAGNOSIS — R94.31 ABNORMAL ELECTROCARDIOGRAM [ECG] [EKG]: ICD-10-CM

## 2019-11-26 DIAGNOSIS — R06.09 OTHER FORMS OF DYSPNEA: ICD-10-CM

## 2019-11-26 DIAGNOSIS — I48.0 PAROXYSMAL ATRIAL FIBRILLATION: ICD-10-CM

## 2019-11-26 PROCEDURE — 93000 ELECTROCARDIOGRAM COMPLETE: CPT

## 2019-11-26 PROCEDURE — 93283 PRGRMG EVAL IMPLANTABLE DFB: CPT

## 2019-11-26 PROCEDURE — 99213 OFFICE O/P EST LOW 20 MIN: CPT | Mod: 25

## 2019-11-26 PROCEDURE — 99214 OFFICE O/P EST MOD 30 MIN: CPT | Mod: 25

## 2019-11-26 RX ORDER — SACUBITRIL AND VALSARTAN 24; 26 MG/1; MG/1
24-26 TABLET, FILM COATED ORAL
Qty: 180 | Refills: 1 | Status: ACTIVE | COMMUNITY
Start: 2019-08-22 | End: 1900-01-01

## 2019-11-26 NOTE — DISCUSSION/SUMMARY
[FreeTextEntry1] : EKG:NSR/atrial pacing with LBB\par f/u with Dr Rice to R/O pulmonary etiology to patient's Sx\par cont Lasix + entresto + metoprolol for systolic CHF;\par cont dig + amiodarone + Eliquis for PAF; entresto for aortic root aneurysm

## 2019-11-26 NOTE — HISTORY OF PRESENT ILLNESS
[FreeTextEntry1] : 79 y.o. male patient with a h/o mitral and aortic valve surgery ~ 4 years ago with Dr. Davis at [facility].  He has been told that he may need a "device" - possibly CRT.  He also has a h/o Atrial fibrillation that was noted post operatively.  He is "not quite right" - he has poor stamina and gets easily fatigued.  He has been undergoing treatment for chronic fatigue syndrome / lyme disease for this. Entresto has been uptitrated by Dr. Zuñiga.  He was admitted to [facility] 7/2019 with AF with RVR- s/p DCCV 7/3019 with ERAF.  He was loaded with AMIO and converted to SR.  S/P dual chamber ICD placement for primary prevention of SCD on 8/8/19.  Found to be back in AF at f/u visit 9/2019, aware of palpitations - re-loaded with Amiodarone and S/P DCCV 9/18/19.  Continued to complain of fatigue and ALCANTAR.  S/P right heart cath with normal CO/CI and minimal pulmonary hypertension.  \par \par ECHO 4/2019 EF 20-25%, global LV hypokinesis, mild conc LVH, severe reversible diastolic dysfunciton (st III), mild-mod TR\par

## 2019-11-26 NOTE — PROCEDURE
[No] : not [NSR] : normal sinus rhythm [ICD] : Implantable cardioverter-defibrillator [Sensing Amplitude ___mv] : sensing amplitude was [unfilled] mv [Lead Imp:  ___ohms] : lead impedance was [unfilled] ohms [___V @] : [unfilled] V [___ ms] : [unfilled] ms [de-identified] : 8/8/19 [de-identified] : [facility] [de-identified] : aai - ddd [de-identified] : No AT/AF since 9/18/19 \par HV impedance 65 \par  [de-identified] :

## 2019-11-26 NOTE — PHYSICAL EXAM
[General Appearance - Well Developed] : well developed [Normal Appearance] : normal appearance [Well Groomed] : well groomed [General Appearance - Well Nourished] : well nourished [No Deformities] : no deformities [General Appearance - In No Acute Distress] : no acute distress [Normal Conjunctiva] : the conjunctiva exhibited no abnormalities [FreeTextEntry1] : mild + JVD [] : no respiratory distress [Respiration, Rhythm And Depth] : normal respiratory rhythm and effort [Exaggerated Use Of Accessory Muscles For Inspiration] : no accessory muscle use [Auscultation Breath Sounds / Voice Sounds] : lungs were clear to auscultation bilaterally [Heart Rate And Rhythm] : heart rate and rhythm were normal [Heart Sounds] : normal S1 and S2 [Murmurs] : no murmurs present [Abdomen Soft] : soft [Abdomen Tenderness] : non-tender [Abnormal Walk] : normal gait [Nail Clubbing] : no clubbing of the fingernails [Oriented To Time, Place, And Person] : oriented to person, place, and time

## 2019-11-26 NOTE — HISTORY OF PRESENT ILLNESS
[FreeTextEntry1] : still with ALCANTAR- had cath with normal CO/CI and minimal pulmonary hypertension- his Sx ARE NOT DUE TO CHF

## 2019-11-26 NOTE — PHYSICAL EXAM
[General Appearance - Well Developed] : well developed [Normal Appearance] : normal appearance [Well Groomed] : well groomed [General Appearance - Well Nourished] : well nourished [No Deformities] : no deformities [General Appearance - In No Acute Distress] : no acute distress [Respiration, Rhythm And Depth] : normal respiratory rhythm and effort [Exaggerated Use Of Accessory Muscles For Inspiration] : no accessory muscle use [Auscultation Breath Sounds / Voice Sounds] : lungs were clear to auscultation bilaterally [Abdomen Soft] : soft [Abdomen Tenderness] : non-tender [Nail Clubbing] : no clubbing of the fingernails [Abdomen Mass (___ Cm)] : no abdominal mass palpated [Petechial Hemorrhages (___cm)] : no petechial hemorrhages [Cyanosis, Localized] : no localized cyanosis [Normal Conjunctiva] : the conjunctiva exhibited no abnormalities [Eyelids - No Xanthelasma] : the eyelids demonstrated no xanthelasmas [Normal Oral Mucosa] : normal oral mucosa [No Oral Pallor] : no oral pallor [No Oral Cyanosis] : no oral cyanosis [Normal Jugular Venous A Waves Present] : normal jugular venous A waves present [Normal Jugular Venous V Waves Present] : normal jugular venous V waves present [No Jugular Venous Castillo A Waves] : no jugular venous castillo A waves [5th Left ICS - MCL] : palpated at the 5th LICS in the midclavicular line [Tachycardia] : tachycardic [Normal] : normal [Irregularly Irregular] : irregularly irregular [Abnormal Walk] : normal gait [Gait - Sufficient For Exercise Testing] : the gait was sufficient for exercise testing [Skin Color & Pigmentation] : normal skin color and pigmentation [No Venous Stasis] : no venous stasis [] : no rash [Skin Lesions] : no skin lesions [No Skin Ulcers] : no skin ulcer [No Xanthoma] : no  xanthoma was observed [Oriented To Time, Place, And Person] : oriented to person, place, and time [Mood] : the mood was normal [Affect] : the affect was normal [No Anxiety] : not feeling anxious

## 2019-11-26 NOTE — REVIEW OF SYSTEMS
[Feeling Fatigued] : feeling fatigued [Dyspnea on exertion] : dyspnea during exertion [Shortness Of Breath] : shortness of breath [Joint Pain] : joint pain [Negative] : Heme/Lymph [Fever] : no fever [Recent Weight Gain (___ Lbs)] : no recent weight gain [Headache] : no headache [Chills] : no chills [Recent Weight Loss (___ Lbs)] : no recent weight loss [Chest  Pressure] : no chest pressure [Chest Pain] : no chest pain [Lower Ext Edema] : no extremity edema [Leg Claudication] : no intermittent leg claudication [Palpitations] : no palpitations [Joint Swelling] : no joint swelling [Joint Stiffness] : no joint stiffness [Muscle Cramps] : no muscle cramps [Limb Weakness (Paresis)] : no limb weakness

## 2019-11-27 DIAGNOSIS — I50.22 CHRONIC SYSTOLIC (CONGESTIVE) HEART FAILURE: ICD-10-CM

## 2019-11-27 DIAGNOSIS — Z99.89 DEPENDENCE ON OTHER ENABLING MACHINES AND DEVICES: ICD-10-CM

## 2019-11-27 DIAGNOSIS — Z96.652 PRESENCE OF LEFT ARTIFICIAL KNEE JOINT: ICD-10-CM

## 2019-11-27 DIAGNOSIS — R53.83 OTHER FATIGUE: ICD-10-CM

## 2019-11-27 DIAGNOSIS — R06.09 OTHER FORMS OF DYSPNEA: ICD-10-CM

## 2019-11-27 DIAGNOSIS — I95.9 HYPOTENSION, UNSPECIFIED: ICD-10-CM

## 2019-11-27 DIAGNOSIS — Z95.810 PRESENCE OF AUTOMATIC (IMPLANTABLE) CARDIAC DEFIBRILLATOR: ICD-10-CM

## 2019-11-27 DIAGNOSIS — Z88.1 ALLERGY STATUS TO OTHER ANTIBIOTIC AGENTS STATUS: ICD-10-CM

## 2019-11-27 DIAGNOSIS — G47.33 OBSTRUCTIVE SLEEP APNEA (ADULT) (PEDIATRIC): ICD-10-CM

## 2019-11-27 DIAGNOSIS — R00.2 PALPITATIONS: ICD-10-CM

## 2019-11-27 DIAGNOSIS — K21.9 GASTRO-ESOPHAGEAL REFLUX DISEASE WITHOUT ESOPHAGITIS: ICD-10-CM

## 2019-11-27 DIAGNOSIS — Z85.820 PERSONAL HISTORY OF MALIGNANT MELANOMA OF SKIN: ICD-10-CM

## 2019-11-27 DIAGNOSIS — Z90.79 ACQUIRED ABSENCE OF OTHER GENITAL ORGAN(S): ICD-10-CM

## 2019-11-27 DIAGNOSIS — I27.20 PULMONARY HYPERTENSION, UNSPECIFIED: ICD-10-CM

## 2019-11-27 DIAGNOSIS — I48.0 PAROXYSMAL ATRIAL FIBRILLATION: ICD-10-CM

## 2019-12-11 ENCOUNTER — APPOINTMENT (OUTPATIENT)
Dept: PULMONOLOGY | Facility: CLINIC | Age: 79
End: 2019-12-11

## 2019-12-12 ENCOUNTER — MEDICATION RENEWAL (OUTPATIENT)
Age: 79
End: 2019-12-12

## 2019-12-13 ENCOUNTER — CHART COPY (OUTPATIENT)
Age: 79
End: 2019-12-13

## 2020-01-03 PROCEDURE — 80053 COMPREHEN METABOLIC PANEL: CPT

## 2020-01-03 PROCEDURE — 85610 PROTHROMBIN TIME: CPT

## 2020-01-03 PROCEDURE — 83036 HEMOGLOBIN GLYCOSYLATED A1C: CPT

## 2020-01-03 PROCEDURE — 36415 COLL VENOUS BLD VENIPUNCTURE: CPT

## 2020-01-03 PROCEDURE — 84100 ASSAY OF PHOSPHORUS: CPT

## 2020-01-03 PROCEDURE — 80061 LIPID PANEL: CPT

## 2020-01-03 PROCEDURE — 94660 CPAP INITIATION&MGMT: CPT

## 2020-01-03 PROCEDURE — 93456 R HRT CORONARY ARTERY ANGIO: CPT

## 2020-01-03 PROCEDURE — 82553 CREATINE MB FRACTION: CPT

## 2020-01-03 PROCEDURE — 85025 COMPLETE CBC W/AUTO DIFF WBC: CPT

## 2020-01-03 PROCEDURE — 85027 COMPLETE CBC AUTOMATED: CPT

## 2020-01-03 PROCEDURE — 80048 BASIC METABOLIC PNL TOTAL CA: CPT

## 2020-01-03 PROCEDURE — 83735 ASSAY OF MAGNESIUM: CPT

## 2020-01-03 PROCEDURE — 93503 INSERT/PLACE HEART CATHETER: CPT

## 2020-01-03 PROCEDURE — 80162 ASSAY OF DIGOXIN TOTAL: CPT

## 2020-01-03 PROCEDURE — 93005 ELECTROCARDIOGRAM TRACING: CPT

## 2020-01-03 PROCEDURE — 82550 ASSAY OF CK (CPK): CPT

## 2020-01-03 PROCEDURE — C1889: CPT

## 2020-01-03 PROCEDURE — 71045 X-RAY EXAM CHEST 1 VIEW: CPT

## 2020-01-03 PROCEDURE — 85730 THROMBOPLASTIN TIME PARTIAL: CPT

## 2020-01-29 NOTE — PATIENT PROFILE ADULT - FALL HARM RISK
Radha PGY2- d/w Dr. Cristobal, pediatrician, agrees with plan for cbc/bmp/ivf/stool studies Radha PGY2- d/w NP at PCP office, will follow up on leukopenia/ANC and bloody stools, pt feeling much improved, HR down to 113, tolerating PO, stable for d/c, return precautions given coagulation(Bleeding disorder R/T clinical cond/anti-coags) improved appearance, walking around, toelrating PO, abdomen benign.  HR responded to fluids, no further stools or BPR.  d/w PMD about repeating CBC to follow ANC when well.  likely viral supporession, no other lines affected.  ROMA Lin, PEM Attending

## 2020-02-25 ENCOUNTER — APPOINTMENT (OUTPATIENT)
Dept: HEART AND VASCULAR | Facility: CLINIC | Age: 80
End: 2020-02-25

## 2020-03-06 NOTE — DISCHARGE NOTE NURSING/CASE MANAGEMENT/SOCIAL WORK - NSDCPEPTCAREGIVEDUMATLIST _GEN_ALL_CORE
NOTIFICATION RETURN TO WORK / SCHOOL 
 
3/6/2020 9:44 AM 
 
Ms. Dennie Angers [address] [phone] To Whom It May Concern: Dennie Angers is currently under the care of Jose Diaz. She will return to work/school on: 3/9/20 If there are questions or concerns please have the patient contact our office. Sincerely, Francisco Randhawa MD 
 
                                
 
 Heart Failure

## 2020-03-30 ENCOUNTER — RX RENEWAL (OUTPATIENT)
Age: 80
End: 2020-03-30

## 2020-05-20 ENCOUNTER — APPOINTMENT (OUTPATIENT)
Dept: CARDIOTHORACIC SURGERY | Facility: CLINIC | Age: 80
End: 2020-05-20
Payer: COMMERCIAL

## 2020-06-01 ENCOUNTER — APPOINTMENT (OUTPATIENT)
Dept: MRI IMAGING | Facility: CLINIC | Age: 80
End: 2020-06-01

## 2020-06-19 ENCOUNTER — APPOINTMENT (OUTPATIENT)
Dept: HEART AND VASCULAR | Facility: CLINIC | Age: 80
End: 2020-06-19
Payer: MEDICARE

## 2020-06-19 ENCOUNTER — NON-APPOINTMENT (OUTPATIENT)
Age: 80
End: 2020-06-19

## 2020-06-19 VITALS — SYSTOLIC BLOOD PRESSURE: 93 MMHG | HEART RATE: 74 BPM | DIASTOLIC BLOOD PRESSURE: 43 MMHG | OXYGEN SATURATION: 99 %

## 2020-06-19 DIAGNOSIS — I48.91 UNSPECIFIED ATRIAL FIBRILLATION: ICD-10-CM

## 2020-06-19 DIAGNOSIS — Z95.810 PRESENCE OF AUTOMATIC (IMPLANTABLE) CARDIAC DEFIBRILLATOR: ICD-10-CM

## 2020-06-19 DIAGNOSIS — I50.22 CHRONIC SYSTOLIC (CONGESTIVE) HEART FAILURE: ICD-10-CM

## 2020-06-19 PROCEDURE — 99213 OFFICE O/P EST LOW 20 MIN: CPT | Mod: 25

## 2020-06-19 PROCEDURE — 93283 PRGRMG EVAL IMPLANTABLE DFB: CPT

## 2020-06-19 NOTE — HISTORY OF PRESENT ILLNESS
[FreeTextEntry1] : 79 y.o. male patient with a h/o mitral and aortic valve surgery ~ 4 years ago [facility] He was admitted to [facility] 7/2019 with AF with RVR- s/p DCCV 7/3019 with ERAF.  He was loaded with AMIO and converted to SR.  S/P dual chamber ICD placement for primary prevention of SCD on 8/8/19.  Found to be back in AF at f/u visit 9/2019, aware of palpitations - re-loaded with Amiodarone and S/P DCCV 9/18/19. He has been feeling well during the COVID crisis.  He came today to discuss the PREEMPT HF trial and to have his device checked. \par \par ECHO 4/2019 EF 20-25%, global LV hypokinesis, mild conc LVH, severe reversible diastolic dysfunciton (st III), mild-mod TR\par

## 2020-06-19 NOTE — PHYSICAL EXAM
[General Appearance - Well Developed] : well developed [Normal Appearance] : normal appearance [Well Groomed] : well groomed [No Deformities] : no deformities [General Appearance - Well Nourished] : well nourished [Respiration, Rhythm And Depth] : normal respiratory rhythm and effort [General Appearance - In No Acute Distress] : no acute distress [Exaggerated Use Of Accessory Muscles For Inspiration] : no accessory muscle use [Auscultation Breath Sounds / Voice Sounds] : lungs were clear to auscultation bilaterally [Abdomen Soft] : soft [Abdomen Tenderness] : non-tender [Abdomen Mass (___ Cm)] : no abdominal mass palpated [Nail Clubbing] : no clubbing of the fingernails [Cyanosis, Localized] : no localized cyanosis [Petechial Hemorrhages (___cm)] : no petechial hemorrhages [Normal Conjunctiva] : the conjunctiva exhibited no abnormalities [Eyelids - No Xanthelasma] : the eyelids demonstrated no xanthelasmas [No Oral Cyanosis] : no oral cyanosis [No Oral Pallor] : no oral pallor [Normal Oral Mucosa] : normal oral mucosa [Normal Jugular Venous A Waves Present] : normal jugular venous A waves present [Normal Jugular Venous V Waves Present] : normal jugular venous V waves present [No Jugular Venous Castillo A Waves] : no jugular venous castillo A waves [5th Left ICS - MCL] : palpated at the 5th LICS in the midclavicular line [Normal] : normal [Tachycardia] : tachycardic [Irregularly Irregular] : irregularly irregular [Abnormal Walk] : normal gait [Gait - Sufficient For Exercise Testing] : the gait was sufficient for exercise testing [Skin Color & Pigmentation] : normal skin color and pigmentation [] : no rash [Skin Lesions] : no skin lesions [No Skin Ulcers] : no skin ulcer [No Venous Stasis] : no venous stasis [Oriented To Time, Place, And Person] : oriented to person, place, and time [No Xanthoma] : no  xanthoma was observed [Mood] : the mood was normal [Affect] : the affect was normal [No Anxiety] : not feeling anxious [Left Infraclavicular] : left infraclavicular area [Clean] : clean [Well-Healed] : well-healed [Palpable Crepitus] : no palpable crepitus [Dry] : dry [Tender] : not tender [Indurated] : not indurated [Warm] : not warm [Erythema] : not erythematous [Fluctuant] : not fluctuant

## 2020-06-19 NOTE — REVIEW OF SYSTEMS
[Feeling Fatigued] : feeling fatigued [Shortness Of Breath] : shortness of breath [Dyspnea on exertion] : dyspnea during exertion [Joint Pain] : joint pain [Negative] : Endocrine [Fever] : no fever [Headache] : no headache [Recent Weight Gain (___ Lbs)] : no recent weight gain [Recent Weight Loss (___ Lbs)] : no recent weight loss [Chills] : no chills [Chest  Pressure] : no chest pressure [Chest Pain] : no chest pain [Leg Claudication] : no intermittent leg claudication [Lower Ext Edema] : no extremity edema [Joint Stiffness] : no joint stiffness [Joint Swelling] : no joint swelling [Palpitations] : no palpitations [Muscle Cramps] : no muscle cramps [Limb Weakness (Paresis)] : no limb weakness

## 2020-06-19 NOTE — PROCEDURE
[No] : not [NSR] : normal sinus rhythm [ICD] : Implantable cardioverter-defibrillator [___ ms] : [unfilled] ms [Lead Imp:  ___ohms] : lead impedance was [unfilled] ohms [___V @] : [unfilled] V [Sensing Amplitude ___mv] : sensing amplitude was [unfilled] mv [de-identified] : [employer] EL ICD D433 [de-identified] : [facility] [de-identified] : [id number] [de-identified] : 8/8/19 [de-identified] : aai - ddd [de-identified] :  [de-identified] : No AT/AF since 9/18/19 \par HV impedance 71 \par

## 2020-07-16 ENCOUNTER — OUTPATIENT (OUTPATIENT)
Dept: OUTPATIENT SERVICES | Facility: HOSPITAL | Age: 80
LOS: 1 days | End: 2020-07-16
Payer: COMMERCIAL

## 2020-07-16 ENCOUNTER — APPOINTMENT (OUTPATIENT)
Dept: MRI IMAGING | Facility: HOSPITAL | Age: 80
End: 2020-07-16
Payer: COMMERCIAL

## 2020-07-16 ENCOUNTER — RESULT REVIEW (OUTPATIENT)
Age: 80
End: 2020-07-16

## 2020-07-16 DIAGNOSIS — Z98.890 OTHER SPECIFIED POSTPROCEDURAL STATES: Chronic | ICD-10-CM

## 2020-07-16 DIAGNOSIS — Z95.2 PRESENCE OF PROSTHETIC HEART VALVE: Chronic | ICD-10-CM

## 2020-07-16 DIAGNOSIS — Z90.79 ACQUIRED ABSENCE OF OTHER GENITAL ORGAN(S): Chronic | ICD-10-CM

## 2020-07-16 PROCEDURE — A9577: CPT

## 2020-07-16 PROCEDURE — 71555 MRI ANGIO CHEST W OR W/O DYE: CPT | Mod: 26

## 2020-07-16 PROCEDURE — 71555 MRI ANGIO CHEST W OR W/O DYE: CPT

## 2020-07-22 ENCOUNTER — APPOINTMENT (OUTPATIENT)
Dept: CARDIOTHORACIC SURGERY | Facility: CLINIC | Age: 80
End: 2020-07-22
Payer: COMMERCIAL

## 2020-07-22 VITALS
HEIGHT: 72 IN | BODY MASS INDEX: 28.17 KG/M2 | WEIGHT: 208 LBS | OXYGEN SATURATION: 95 % | SYSTOLIC BLOOD PRESSURE: 81 MMHG | DIASTOLIC BLOOD PRESSURE: 57 MMHG | TEMPERATURE: 96.9 F | HEART RATE: 71 BPM | RESPIRATION RATE: 18 BRPM

## 2020-07-22 PROCEDURE — 99214 OFFICE O/P EST MOD 30 MIN: CPT

## 2020-07-22 RX ORDER — LEVOCARNITINE 330 MG/1
330 TABLET ORAL
Qty: 30 | Refills: 0 | Status: COMPLETED | COMMUNITY
Start: 2019-06-07 | End: 2020-07-22

## 2020-07-22 RX ORDER — DIGOXIN 125 UG/1
125 TABLET ORAL DAILY
Qty: 90 | Refills: 1 | Status: COMPLETED | COMMUNITY
End: 2020-07-22

## 2020-07-22 RX ORDER — METOPROLOL SUCCINATE 25 MG/1
25 TABLET, EXTENDED RELEASE ORAL DAILY
Qty: 1 | Refills: 1 | Status: COMPLETED | COMMUNITY
Start: 2018-10-17 | End: 2020-07-22

## 2020-07-22 RX ORDER — CARVEDILOL 12.5 MG/1
12.5 TABLET, FILM COATED ORAL
Qty: 60 | Refills: 1 | Status: ACTIVE | COMMUNITY
Start: 2020-07-22

## 2020-07-22 RX ORDER — ACYCLOVIR 800 MG/1
TABLET ORAL
Refills: 0 | Status: COMPLETED | COMMUNITY
End: 2020-07-22

## 2020-07-22 NOTE — PHYSICAL EXAM
[] : no respiratory distress [Sclera] : the sclera and conjunctiva were normal [Apical Impulse] : the apical impulse was normal [Examination Of The Chest] : the chest was normal in appearance [2+] : left 2+ [Abdomen Soft] : soft [No CVA Tenderness] : no ~M costovertebral angle tenderness [Abnormal Walk] : normal gait [Deep Tendon Reflexes (DTR)] : deep tendon reflexes were 2+ and symmetric [Oriented To Time, Place, And Person] : oriented to person, place, and time

## 2020-07-30 NOTE — CONSULT LETTER
[Dear  ___] : Dear  [unfilled], [Please see my note below.] : Please see my note below. [Sincerely,] : Sincerely, [FreeTextEntry2] : Ramesh Barker MD\par [address]\[place] [address] [FreeTextEntry1] : I had the pleasure of seeing your patient, DALTON FLOWER, in my office today. We take a multidisciplinary team approach to patient care and consider you, the referring physician, an extension of our team. We will maintain an open line of communication with you throughout your patient's treatment course.  \par \par He is being evaluated for an ascending aortic and aortic root aneurysm. I have reviewed all of the medical records and diagnostic images at the time of his office visit. I have enclosed a copy for your records. \par \par I have reviewed the indications for surgery,and used our webpage [email or web address] <[email or web address]> to illustrate the aorta and anatomy of the heart. The patient does not meet size criteria for surgical intervention at the time. Therefore, I have recommended that the patient will require a follow up appointment in one year with a MRA chest to monitor aortic pathology. My office will assist the patient with his upcoming appointment and I will update you on his progress at that time.\par \par I have discussed with the patient that we will continue to monitor his aortic pathology closely at the [facility] at [facility] [place] that encompasses the entire health care system and is one of the largest in the nation at this point.\par \par It is our commitment to provide your patient with the highest quality of advanced therapeutic options. On behalf of the Cardiothoracic Surgery Team, thank you for sending your patient to the [facility] based at [facility].  If there are any questions or concerns, please call me directly at [phone].  \par \par  \par  [FreeTextEntry3] : \par Delon Jose M.D.\par Professor of Cardiovascular and Thoracic Surgery\par Minimally Invasive Valve Surgeon\par Director of Aortic Surgery, [facility]\par Cell: [phone]\par Email: [email or web address] \par \par [facility]:\par [address], [address] Floor, [place] [address]\par Office: [phone]\par Fax: [phone]\par \par [facility]:\par Department of Cardiovascular and Thoracic Surgery\par [address] [place] [address], [address]\par Office: [phone]\par Fax: [phone]\par \par Practice Manager: Ms. Janelle Shahid\par Email: [email or web address]\par Phone: [phone]\par \par

## 2020-07-30 NOTE — HISTORY OF PRESENT ILLNESS
[FreeTextEntry1] : 79 year old male with history of Lyme Disease (being followed by Dr. López, treated with antibiotics, off for a year now), MVR/AVR with Dr. Davis at [facility] 12/2014, cardiomyopathy with reduced EF (20-25%), A fib (on Eliquis, being followed by Dr. Ortiz for ICD/CRT), ROHAN on CPAP, fatigue, presents today for an evaluation of an aortic aneurysm. \par \par MRA chest 7/16/20:\par Aortic root: 4.4 cm\par Ascending aorta: 5.0 cm\par Aortic arch: 2.8 cm\par Right brachiocephalic origin: No significant stenosis.\par Left common carotid artery origin: No significant stenosis.\par Left subclavian origin: No significant stenosis.\par Descending thoracic aorta: 3.4 cm\par \par The above measurements have not significantly changed.\par \par Patient states he feels significantly better than last year. Patient denies any fever, chills, fatigue, syncope, acute chest pain, palpitations, SOB, orthopnea, paroxysmal nocturnal dyspnea, vomiting, abdominal pain, back pain, BRBPR or swelling to legs. \par \par Patient is an actor. He remains an active lifestyle by going to the gym every other day (bicycle or treadmill).\par \par

## 2020-07-30 NOTE — END OF VISIT
[FreeTextEntry3] : I, EASTON MACKEY , am scribing for and in the presence of LUIS MUHAMMAD the following sections: History of present illness, past Medical/family/surgical/family/social history, review of systems, vital signs, physical exam and disposition.\par \par I personally performed the services described in the documentation, reviewed the documentation recorded by the scribe in my presence and it accurately and completely records my words and actions.\par \par \par

## 2020-07-30 NOTE — PROCEDURE
[FreeTextEntry1] : Dr. Jose discussed activity restrictions with the patient, and would advise exercise at a moderate amount with no heavy lifting over one third of body weight, and avoiding heart rates that exceed 140 beats per minute. Every patient must abstain from tobacco and illicit drug use, especially stimulants such as cocaine or methamphetamine. The patient was also counseled on maintaining a healthy heart diet, and losing any excessive weight as this also put undue stress on both the aorta and entire cardiovascular system. He was counseled on the importance of medication adherence for blood pressure control, as hypertension is a significant risk factor associated with aortic aneurysms. First degree family members should be screened for bicuspid valve disease, and ascending aortic aneurysms.\par \par Dr. Jose reviewed the indications for surgery, and used our webpage [email or web address] to illustrate the aorta and anatomy of the heart. Those indications are the following: size greater than 5.0 cm, symptomatic aneurysms, family history of aortic dissection or aneurysm death with a size greater than 4.5 cm, other necessary cardiac procedures such as coronary artery bypass grafting or severe valvular disorders with an aneurysm greater than 4.5 cm, or connective tissue disorders with an aneurysm size greater than 4.5 cm. The patient does not meet size criteria for surgical intervention at the time.\par \par  \par

## 2020-07-30 NOTE — ASSESSMENT
[FreeTextEntry1] : 79 year old male with history of Lyme Disease (being followed by Dr. López, treated with antibiotics, off for a year now), MVR/AVR with Dr. Davis at [facility] 12/2014, cardiomyopathy with reduced EF (20-25%), A fib (on Eliquis, being followed by Dr. Ortiz for ICD/CRT), ROHAN on CPAP, fatigue, presents today for an evaluation of an aortic aneurysm. \par \par The patient's medical records and diagnostic images were reviewed at the time of this office visit, and the following recommendation was made. Review of the imaging shows aortic pathology has remained stable and does not require surgical intervention at this time.\par \par Plan:\par 1. Continue medication regimen\par 2. Follow up with PCP and cardiologist\par 3. BP control- I have recommended the patient to monitor his blood pressure closely. I have also advised the patient to take daily blood pressures at home and adhere to medication regimen.\par 4. Return to the [facility] in 1 year with MRA chest

## 2020-07-30 NOTE — DATA REVIEWED
[FreeTextEntry1] : \par Echo 4/16/19: Aortic root 4.1cm, ascending aorta 4.8cm, bioprosthetic mitral valve replacement with minimal MR, bioprosthetic aortic valve replacement with no AR, EF 20-25%, severe reversible diastolic dysfunction (stage III).\par \par MRA chest 5/3/19: aortic root 4.4cm, ascending aorta 5cm. \par \par \par MRA chest 7/16/20:\par Aortic root: 4.4 cm\par Ascending aorta: 5.0 cm\par Aortic arch: 2.8 cm\par Right brachiocephalic origin: No significant stenosis.\par Left common carotid artery origin: No significant stenosis.\par Left subclavian origin: No significant stenosis.\par Descending thoracic aorta: 3.4 cm\par

## 2020-09-08 ENCOUNTER — RX RENEWAL (OUTPATIENT)
Age: 80
End: 2020-09-08

## 2020-09-08 RX ORDER — APIXABAN 5 MG/1
5 TABLET, FILM COATED ORAL
Qty: 60 | Refills: 0 | Status: ACTIVE | COMMUNITY
Start: 2020-09-08 | End: 1900-01-01

## 2020-11-10 ENCOUNTER — NON-APPOINTMENT (OUTPATIENT)
Age: 80
End: 2020-11-10

## 2021-03-11 NOTE — PATIENT PROFILE ADULT - NSPROHMCARDIOSYMPCOND_GEN_A_NUR
dysrhythmia
Render Risk Assessment In Note?: no
Recommendation Preamble: The following recommendations were made during the visit:
Recommendations (Free Text): Discussed changing bathing habits along with soap change and using a thick moisturizer
Detail Level: Detailed

## 2021-07-22 ENCOUNTER — RESULT REVIEW (OUTPATIENT)
Age: 81
End: 2021-07-22

## 2021-07-22 ENCOUNTER — APPOINTMENT (OUTPATIENT)
Dept: MRI IMAGING | Facility: HOSPITAL | Age: 81
End: 2021-07-22

## 2021-07-22 ENCOUNTER — OUTPATIENT (OUTPATIENT)
Dept: OUTPATIENT SERVICES | Facility: HOSPITAL | Age: 81
LOS: 1 days | End: 2021-07-22
Payer: MEDICARE

## 2021-07-22 DIAGNOSIS — Z98.890 OTHER SPECIFIED POSTPROCEDURAL STATES: Chronic | ICD-10-CM

## 2021-07-22 DIAGNOSIS — Z95.2 PRESENCE OF PROSTHETIC HEART VALVE: Chronic | ICD-10-CM

## 2021-07-22 DIAGNOSIS — Z90.79 ACQUIRED ABSENCE OF OTHER GENITAL ORGAN(S): Chronic | ICD-10-CM

## 2021-07-22 PROCEDURE — 71555 MRI ANGIO CHEST W OR W/O DYE: CPT | Mod: MH

## 2021-07-22 PROCEDURE — 71555 MRI ANGIO CHEST W OR W/O DYE: CPT | Mod: 26,MH

## 2021-07-28 ENCOUNTER — APPOINTMENT (OUTPATIENT)
Dept: CARDIOTHORACIC SURGERY | Facility: CLINIC | Age: 81
End: 2021-07-28
Payer: MEDICARE

## 2021-07-28 VITALS
HEIGHT: 72 IN | BODY MASS INDEX: 28.58 KG/M2 | RESPIRATION RATE: 17 BRPM | DIASTOLIC BLOOD PRESSURE: 57 MMHG | TEMPERATURE: 96.9 F | SYSTOLIC BLOOD PRESSURE: 89 MMHG | HEART RATE: 76 BPM | WEIGHT: 211 LBS | OXYGEN SATURATION: 98 %

## 2021-07-28 VITALS
HEART RATE: 76 BPM | OXYGEN SATURATION: 98 % | WEIGHT: 211 LBS | TEMPERATURE: 96.9 F | DIASTOLIC BLOOD PRESSURE: 57 MMHG | SYSTOLIC BLOOD PRESSURE: 89 MMHG | BODY MASS INDEX: 28.58 KG/M2 | HEIGHT: 72 IN | RESPIRATION RATE: 17 BRPM

## 2021-07-28 DIAGNOSIS — I71.2 THORACIC AORTIC ANEURYSM, W/OUT RUPTURE: ICD-10-CM

## 2021-07-28 DIAGNOSIS — I71.9 AORTIC ANEURYSM OF UNSPECIFIED SITE, W/OUT RUPTURE: ICD-10-CM

## 2021-07-28 DIAGNOSIS — Z95.2 PRESENCE OF PROSTHETIC HEART VALVE: ICD-10-CM

## 2021-07-28 PROCEDURE — 99213 OFFICE O/P EST LOW 20 MIN: CPT

## 2021-07-29 PROBLEM — Z95.2 HISTORY OF AORTIC VALVE REPLACEMENT: Status: ACTIVE | Noted: 2019-05-16

## 2021-07-29 PROBLEM — Z95.2 H/O MITRAL VALVE REPLACEMENT: Status: ACTIVE | Noted: 2019-04-16

## 2021-07-29 PROBLEM — I71.2 ASCENDING AORTIC ANEURYSM: Status: ACTIVE | Noted: 2019-05-16

## 2021-07-29 PROBLEM — I71.9 AORTIC ROOT ANEURYSM: Status: ACTIVE | Noted: 2019-05-16

## 2021-07-29 RX ORDER — AMIODARONE HYDROCHLORIDE 200 MG/1
200 TABLET ORAL
Qty: 30 | Refills: 1 | Status: DISCONTINUED | COMMUNITY
Start: 2020-03-30 | End: 2021-07-29

## 2021-07-29 NOTE — REVIEW OF SYSTEMS
[Feeling Tired] : feeling tired [SOB on Exertion] : shortness of breath during exertion [Negative] : Cardiovascular

## 2021-08-01 NOTE — ASSESSMENT
[FreeTextEntry1] : 80 year old male with history of MVR/AVR with Dr. Davis at [facility] 12/2014, cardiomyopathy with reduced EF (20-25%), A fib (on Eliquis, being followed by Dr. Ortiz for ICD/CRT), ROHAN on CPAP, lyme disease, presents for a 1 year followup visit for an aortic aneurysm with repeat diagnostic imaging. \par \par The patient's medical records and diagnostic images were reviewed at the time of this office visit, and the following recommendation was made. Review of the imaging shows aortic pathology has remained stable and does not require surgical intervention at this time.\par \par Plan:\par 1. Continue medication regimen\par 2. Follow up with PCP and cardiologist\par 3. Return to the [facility] in 2 year of MRA chest.

## 2021-08-01 NOTE — DATA REVIEWED
[FreeTextEntry1] : Echo 4/16/19: Aortic root 4.1cm, ascending aorta 4.8cm, bioprosthetic mitral valve replacement with minimal MR, bioprosthetic aortic valve replacement with no AR, EF 20-25%, severe reversible diastolic dysfunction (stage III).\par \par MRA chest 5/3/19: aortic root 4.4cm, ascending aorta 5cm. \par \par MRA chest 7/16/20:\par Aortic root: 4.4 cm\par Ascending aorta: 5.0 cm\par Aortic arch: 2.8 cm\par Right brachiocephalic origin: No significant stenosis.\par Left common carotid artery origin: No significant stenosis.\par Left subclavian origin: No significant stenosis.\par Descending thoracic aorta: 3.4 cm\par \par MR chest No contrast 7/22/21: \par Maximum aortic measurements are as below:\par Aortic root: 4.4 cm\par Ascending aorta: 5.0 cm\par Aortic arch: 3.6 cm\par Proximal descending aorta: 3.6 cm\par Mid descending aorta: 3.2 cm\par Aorta at hiatus: 3.0 cm\par Significantly limited evaluation due to susceptibility artifact.\par Grossly no significant interval change of thoracic aortic aneurysm.

## 2021-08-01 NOTE — HISTORY OF PRESENT ILLNESS
[FreeTextEntry1] : 80 year old male with history of MVR/AVR with Dr. Davis at [facility] 12/2014, cardiomyopathy with reduced EF (20-25%), A fib (on Eliquis, being followed by Dr. Ortiz for ICD/CRT), ROHAN on CPAP, lyme disease, presents for a 1 year followup visit for an aortic aneurysm with repeat diagnostic imaging. \par \par MR chest No contrast 7/22/21: \par Aortic root: 4.4 cm\par Ascending aorta: 5.0 cm\par Aortic arch: 3.6 cm\par Proximal descending aorta: 3.6 cm\par Mid descending aorta: 3.2 cm\par Aorta at hiatus: 3.0 cm\par Significantly limited evaluation due to susceptibility artifact.\par Grossly no significant interval change of thoracic aortic aneurysm.\par \par Patient is being followed by heart failure clinic at [facility]. He reports chronic fatigue and dyspnea on exertion. \par

## 2021-08-01 NOTE — PHYSICAL EXAM
[Sclera] : the sclera and conjunctiva were normal [Neck Appearance] : the appearance of the neck was normal [] : the neck was supple [Respiration, Rhythm And Depth] : normal respiratory rhythm and effort [Heart Rate And Rhythm] : heart rate was normal and rhythm regular [Heart Sounds] : normal S1 and S2 [Examination Of The Chest] : the chest was normal in appearance [2+] : left 2+ [No Abnormalities] : the abdominal aorta was not enlarged and no bruit was heard [Bowel Sounds] : normal bowel sounds [No CVA Tenderness] : no ~M costovertebral angle tenderness [Abnormal Walk] : normal gait [Skin Color & Pigmentation] : normal skin color and pigmentation [Skin Turgor] : normal skin turgor [No Focal Deficits] : no focal deficits [Oriented To Time, Place, And Person] : oriented to person, place, and time [FreeTextEntry1] : deferred

## 2021-08-01 NOTE — END OF VISIT
[Time Spent: ___ minutes] : I have spent [unfilled] minutes of time on the encounter. [FreeTextEntry3] : \par I, ANNI HEINU , am scribing for and in the presence of LUIS MUHAMMAD the following sections: History of present illness, past Medical/family/surgical/family/social history, review of systems, vital signs, physical exam and disposition.\par \par I personally performed the services described in the documentation, reviewed the documentation recorded by the scribe in my presence and it accurately and completely records my words and actions.\par \par

## 2021-08-16 ENCOUNTER — RESULT REVIEW (OUTPATIENT)
Age: 81
End: 2021-08-16

## 2021-08-16 ENCOUNTER — OUTPATIENT (OUTPATIENT)
Dept: OUTPATIENT SERVICES | Facility: HOSPITAL | Age: 81
LOS: 1 days | End: 2021-08-16
Payer: MEDICARE

## 2021-08-16 ENCOUNTER — APPOINTMENT (OUTPATIENT)
Dept: ORTHOPEDIC SURGERY | Facility: CLINIC | Age: 81
End: 2021-08-16
Payer: MEDICARE

## 2021-08-16 DIAGNOSIS — M12.812 OTHER SPECIFIC ARTHROPATHIES, NOT ELSEWHERE CLASSIFIED, LEFT SHOULDER: ICD-10-CM

## 2021-08-16 DIAGNOSIS — Z90.79 ACQUIRED ABSENCE OF OTHER GENITAL ORGAN(S): Chronic | ICD-10-CM

## 2021-08-16 DIAGNOSIS — Z98.890 OTHER SPECIFIED POSTPROCEDURAL STATES: Chronic | ICD-10-CM

## 2021-08-16 DIAGNOSIS — Z95.2 PRESENCE OF PROSTHETIC HEART VALVE: Chronic | ICD-10-CM

## 2021-08-16 DIAGNOSIS — M12.811 OTHER SPECIFIC ARTHROPATHIES, NOT ELSEWHERE CLASSIFIED, RIGHT SHOULDER: ICD-10-CM

## 2021-08-16 PROCEDURE — 73030 X-RAY EXAM OF SHOULDER: CPT

## 2021-08-16 PROCEDURE — 99204 OFFICE O/P NEW MOD 45 MIN: CPT

## 2021-08-16 PROCEDURE — 72050 X-RAY EXAM NECK SPINE 4/5VWS: CPT

## 2021-08-16 PROCEDURE — 72050 X-RAY EXAM NECK SPINE 4/5VWS: CPT | Mod: 26

## 2021-08-16 PROCEDURE — 73030 X-RAY EXAM OF SHOULDER: CPT | Mod: 26,50

## 2021-08-16 RX ORDER — EPLERENONE 25 MG/1
25 TABLET, COATED ORAL
Qty: 90 | Refills: 0 | Status: ACTIVE | COMMUNITY
Start: 2021-07-26

## 2021-08-16 RX ORDER — IBUPROFEN 600 MG/1
600 TABLET, FILM COATED ORAL 3 TIMES DAILY
Qty: 90 | Refills: 0 | Status: ACTIVE | COMMUNITY
Start: 2021-08-16 | End: 1900-01-01

## 2021-08-16 RX ORDER — SACUBITRIL AND VALSARTAN 49; 51 MG/1; MG/1
49-51 TABLET, FILM COATED ORAL
Qty: 180 | Refills: 0 | Status: ACTIVE | COMMUNITY
Start: 2021-05-30

## 2021-08-16 RX ORDER — IVERMECTIN 3 MG/1
3 TABLET ORAL
Qty: 30 | Refills: 0 | Status: ACTIVE | COMMUNITY
Start: 2021-05-28

## 2021-08-16 RX ORDER — CARVEDILOL 6.25 MG/1
6.25 TABLET, FILM COATED ORAL
Qty: 180 | Refills: 0 | Status: ACTIVE | COMMUNITY
Start: 2021-05-30

## 2021-08-16 RX ORDER — ALLOPURINOL 100 MG/1
100 TABLET ORAL
Qty: 90 | Refills: 0 | Status: ACTIVE | COMMUNITY
Start: 2021-08-09

## 2022-07-11 ENCOUNTER — APPOINTMENT (OUTPATIENT)
Dept: ENDOCRINOLOGY | Facility: CLINIC | Age: 82
End: 2022-07-11

## 2022-07-11 VITALS — WEIGHT: 212 LBS | HEIGHT: 72 IN | BODY MASS INDEX: 28.71 KG/M2

## 2022-07-11 DIAGNOSIS — R73.03 PREDIABETES.: ICD-10-CM

## 2022-07-11 DIAGNOSIS — D35.00 BENIGN NEOPLASM OF UNSPECIFIED ADRENAL GLAND: ICD-10-CM

## 2022-07-11 LAB
GLUCOSE BLDC GLUCOMTR-MCNC: 96
HBA1C MFR BLD HPLC: 6.1

## 2022-07-11 PROCEDURE — 99204 OFFICE O/P NEW MOD 45 MIN: CPT | Mod: 25

## 2022-07-11 PROCEDURE — 82962 GLUCOSE BLOOD TEST: CPT

## 2022-07-11 PROCEDURE — 83036 HEMOGLOBIN GLYCOSYLATED A1C: CPT | Mod: QW

## 2022-07-11 RX ORDER — TORSEMIDE 20 MG/1
20 TABLET ORAL
Qty: 180 | Refills: 0 | Status: ACTIVE | COMMUNITY
Start: 2022-05-31

## 2022-07-11 RX ORDER — CARVEDILOL 3.12 MG/1
3.12 TABLET, FILM COATED ORAL
Qty: 180 | Refills: 0 | Status: ACTIVE | COMMUNITY
Start: 2021-12-08

## 2022-07-11 RX ORDER — POTASSIUM CHLORIDE 1500 MG/1
20 TABLET, EXTENDED RELEASE ORAL
Qty: 30 | Refills: 0 | Status: ACTIVE | COMMUNITY
Start: 2022-06-02

## 2022-07-11 RX ORDER — COVID-19 ANTIGEN TEST
KIT MISCELLANEOUS
Qty: 1 | Refills: 0 | Status: ACTIVE | COMMUNITY
Start: 2022-05-01

## 2022-07-11 NOTE — REASON FOR VISIT
[Initial Evaluation] : an initial evaluation [Adrenal Evaluation/Adrenal Disorder] : adrenal evaluation/adrenal disorder [Other___] : [unfilled]

## 2022-07-11 NOTE — ASSESSMENT
[FreeTextEntry1] : Left adrenal mass.\par Prediabetes.\par \par \par Lab. tests ordered.\par Will repeat abd. CT scan in 3 months.\par F/U once the above results are available.

## 2022-07-11 NOTE — HISTORY OF PRESENT ILLNESS
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 Recalls receiving testosterone injections about 20 yrs ago for "low testosterone level".\par He also c/o weight gain of about 20 lb in the last 4-5 yrs.\par 4/3/19. The patient is doing somewhat better. He has lost 6 lb.\par Is interested in T replacement at this time.\par HbA1C today is 6%, glucose - 123 mg/dl.\par 7/11/22. The patient was seen at [facility] ER for a possible infection and had abdominal CT scan done.\par It revealed a 3.5 cm left adrenal mass.\par He is referred here for further evaluation of this mass.\par He thinks that this finding was mentioned in the pat but does not know any details and does not know how to obtain this information.\par HbA1C today is 6.1%, glucose - 96 mg/dl.

## 2022-07-18 LAB
CREAT SPEC-SCNC: 71 MG/DL
MICROALBUMIN 24H UR DL<=1MG/L-MCNC: 3.5 MG/DL
MICROALBUMIN/CREAT 24H UR-RTO: 50 MG/G

## 2022-07-21 ENCOUNTER — NON-APPOINTMENT (OUTPATIENT)
Age: 82
End: 2022-07-21

## 2022-07-27 LAB
ALBUMIN SERPL ELPH-MCNC: 4.3 G/DL
ALDOSTERONE SERUM: 14.9 NG/DL
ALP BLD-CCNC: 64 U/L
ALT SERPL-CCNC: 10 U/L
ANION GAP SERPL CALC-SCNC: 16 MMOL/L
AST SERPL-CCNC: 18 U/L
BASOPHILS # BLD AUTO: 0.04 K/UL
BASOPHILS NFR BLD AUTO: 0.9 %
BILIRUB SERPL-MCNC: 0.8 MG/DL
BUN SERPL-MCNC: 21 MG/DL
CALCIUM SERPL-MCNC: 9.2 MG/DL
CHLORIDE SERPL-SCNC: 100 MMOL/L
CHOLEST SERPL-MCNC: 149 MG/DL
CO2 SERPL-SCNC: 27 MMOL/L
CREAT SERPL-MCNC: 1.29 MG/DL
EGFR: 55 ML/MIN/1.73M2
EOSINOPHIL # BLD AUTO: 0.3 K/UL
EOSINOPHIL NFR BLD AUTO: 6.8 %
ESTIMATED AVERAGE GLUCOSE: 128 MG/DL
GLUCOSE SERPL-MCNC: 110 MG/DL
HBA1C MFR BLD HPLC: 6.1 %
HCT VFR BLD CALC: 39.9 %
HDLC SERPL-MCNC: 42 MG/DL
HGB BLD-MCNC: 13.3 G/DL
IMM GRANULOCYTES NFR BLD AUTO: 0.2 %
LDLC SERPL CALC-MCNC: 86 MG/DL
LYMPHOCYTES # BLD AUTO: 0.42 K/UL
LYMPHOCYTES NFR BLD AUTO: 9.5 %
MAN DIFF?: NORMAL
MCHC RBC-ENTMCNC: 32 PG
MCHC RBC-ENTMCNC: 33.3 GM/DL
MCV RBC AUTO: 96.1 FL
METANEPH 24H UR-MRATE: 276 UG/24 HR
METANEPHRINE, PL: 20.4 PG/ML
METANEPHS 24H UR-MCNC: 46 UG/L
MONOCYTES # BLD AUTO: 0.53 K/UL
MONOCYTES NFR BLD AUTO: 12 %
NEUTROPHILS # BLD AUTO: 3.13 K/UL
NEUTROPHILS NFR BLD AUTO: 70.6 %
NONHDLC SERPL-MCNC: 107 MG/DL
NORMETANEPHRINE 24 HR URINE: 1986 UG/24 HR
NORMETANEPHRINE 24H UR-MCNC: 331 UG/L
NORMETANEPHRINE, PL: 296.3 PG/ML
PLATELET # BLD AUTO: 169 K/UL
POTASSIUM SERPL-SCNC: 3.7 MMOL/L
PROT SERPL-MCNC: 7.4 G/DL
RBC # BLD: 4.15 M/UL
RBC # FLD: 14.7 %
RENIN ACTIVITY, PLASMA: 11.36 NG/ML/HR
RENIN PLASMA: 89.4 PG/ML
SODIUM SERPL-SCNC: 143 MMOL/L
T3 SERPL-MCNC: 96 NG/DL
T4 FREE SERPL-MCNC: 1.5 NG/DL
TRIGL SERPL-MCNC: 106 MG/DL
TSH SERPL-ACNC: 3.27 UIU/ML
WBC # FLD AUTO: 4.43 K/UL

## 2022-07-28 LAB
CORTIS 24H UR-MCNC: 24 H
CORTIS 24H UR-MRATE: 3.3 MCG/24 H
DOPAMINE UR-MCNC: 138 UG/L
DOPAMINE, UR, 24HR: 83 UG/24 HR
EPINEPH UR-MCNC: 2 UG/L
EPINEPHRINE, U, 24HR: 1 UG/24 HR
NOREPINEPH UR-MCNC: 61 UG/L
NOREPINEPHRINE,U,24H: 37 UG/24 HR
SPECIMEN VOL 24H UR: 600 ML

## 2022-08-07 ENCOUNTER — NON-APPOINTMENT (OUTPATIENT)
Age: 82
End: 2022-08-07

## 2022-08-08 ENCOUNTER — NON-APPOINTMENT (OUTPATIENT)
Age: 82
End: 2022-08-08

## 2023-01-05 ENCOUNTER — INPATIENT (INPATIENT)
Facility: HOSPITAL | Age: 83
LOS: 1 days | Discharge: HOME | DRG: 392 | End: 2023-01-07
Attending: INTERNAL MEDICINE | Admitting: INTERNAL MEDICINE
Payer: MEDICARE

## 2023-01-05 VITALS
TEMPERATURE: 98 F | OXYGEN SATURATION: 95 % | DIASTOLIC BLOOD PRESSURE: 54 MMHG | HEART RATE: 47 BPM | RESPIRATION RATE: 16 BRPM | HEIGHT: 71 IN | WEIGHT: 176.37 LBS | SYSTOLIC BLOOD PRESSURE: 78 MMHG

## 2023-01-05 DIAGNOSIS — Z90.79 ACQUIRED ABSENCE OF OTHER GENITAL ORGAN(S): Chronic | ICD-10-CM

## 2023-01-05 DIAGNOSIS — Z98.890 OTHER SPECIFIED POSTPROCEDURAL STATES: Chronic | ICD-10-CM

## 2023-01-05 DIAGNOSIS — Z95.2 PRESENCE OF PROSTHETIC HEART VALVE: Chronic | ICD-10-CM

## 2023-01-05 LAB
ALBUMIN SERPL ELPH-MCNC: 3.8 G/DL — SIGNIFICANT CHANGE UP (ref 3.4–5)
ALP SERPL-CCNC: 72 U/L — SIGNIFICANT CHANGE UP (ref 40–120)
ALT FLD-CCNC: 17 U/L — SIGNIFICANT CHANGE UP (ref 12–42)
ANION GAP SERPL CALC-SCNC: 11 MMOL/L — SIGNIFICANT CHANGE UP (ref 9–16)
AST SERPL-CCNC: 17 U/L — SIGNIFICANT CHANGE UP (ref 15–37)
BASOPHILS # BLD AUTO: 0.04 K/UL — SIGNIFICANT CHANGE UP (ref 0–0.2)
BASOPHILS NFR BLD AUTO: 0.6 % — SIGNIFICANT CHANGE UP (ref 0–2)
BILIRUB SERPL-MCNC: 1.3 MG/DL — HIGH (ref 0.2–1.2)
BUN SERPL-MCNC: 23 MG/DL — SIGNIFICANT CHANGE UP (ref 7–23)
CALCIUM SERPL-MCNC: 9.2 MG/DL — SIGNIFICANT CHANGE UP (ref 8.5–10.5)
CHLORIDE SERPL-SCNC: 102 MMOL/L — SIGNIFICANT CHANGE UP (ref 96–108)
CO2 SERPL-SCNC: 26 MMOL/L — SIGNIFICANT CHANGE UP (ref 22–31)
CREAT SERPL-MCNC: 1.68 MG/DL — HIGH (ref 0.5–1.3)
EGFR: 40 ML/MIN/1.73M2 — LOW
EOSINOPHIL # BLD AUTO: 0.13 K/UL — SIGNIFICANT CHANGE UP (ref 0–0.5)
EOSINOPHIL NFR BLD AUTO: 1.8 % — SIGNIFICANT CHANGE UP (ref 0–6)
FLUAV AG NPH QL: SIGNIFICANT CHANGE UP
FLUBV AG NPH QL: SIGNIFICANT CHANGE UP
GLUCOSE SERPL-MCNC: 120 MG/DL — HIGH (ref 70–99)
HCT VFR BLD CALC: 42.7 % — SIGNIFICANT CHANGE UP (ref 39–50)
HGB BLD-MCNC: 14.2 G/DL — SIGNIFICANT CHANGE UP (ref 13–17)
IMM GRANULOCYTES NFR BLD AUTO: 0.6 % — SIGNIFICANT CHANGE UP (ref 0–0.9)
LACTATE SERPL-SCNC: 1.6 MMOL/L — SIGNIFICANT CHANGE UP (ref 0.4–2)
LYMPHOCYTES # BLD AUTO: 0.63 K/UL — LOW (ref 1–3.3)
LYMPHOCYTES # BLD AUTO: 8.9 % — LOW (ref 13–44)
MCHC RBC-ENTMCNC: 30 PG — SIGNIFICANT CHANGE UP (ref 27–34)
MCHC RBC-ENTMCNC: 33.3 GM/DL — SIGNIFICANT CHANGE UP (ref 32–36)
MCV RBC AUTO: 90.3 FL — SIGNIFICANT CHANGE UP (ref 80–100)
MONOCYTES # BLD AUTO: 0.99 K/UL — HIGH (ref 0–0.9)
MONOCYTES NFR BLD AUTO: 14 % — SIGNIFICANT CHANGE UP (ref 2–14)
NEUTROPHILS # BLD AUTO: 5.25 K/UL — SIGNIFICANT CHANGE UP (ref 1.8–7.4)
NEUTROPHILS NFR BLD AUTO: 74.1 % — SIGNIFICANT CHANGE UP (ref 43–77)
NRBC # BLD: 0 /100 WBCS — SIGNIFICANT CHANGE UP (ref 0–0)
PLATELET # BLD AUTO: 190 K/UL — SIGNIFICANT CHANGE UP (ref 150–400)
POTASSIUM SERPL-MCNC: 3.4 MMOL/L — LOW (ref 3.5–5.3)
POTASSIUM SERPL-SCNC: 3.4 MMOL/L — LOW (ref 3.5–5.3)
PROT SERPL-MCNC: 7.9 G/DL — SIGNIFICANT CHANGE UP (ref 6.4–8.2)
RBC # BLD: 4.73 M/UL — SIGNIFICANT CHANGE UP (ref 4.2–5.8)
RBC # FLD: 15.5 % — HIGH (ref 10.3–14.5)
RSV RNA NPH QL NAA+NON-PROBE: SIGNIFICANT CHANGE UP
SARS-COV-2 RNA SPEC QL NAA+PROBE: SIGNIFICANT CHANGE UP
SODIUM SERPL-SCNC: 139 MMOL/L — SIGNIFICANT CHANGE UP (ref 132–145)
TROPONIN I, HIGH SENSITIVITY RESULT: 71 NG/L — SIGNIFICANT CHANGE UP
WBC # BLD: 7.08 K/UL — SIGNIFICANT CHANGE UP (ref 3.8–10.5)
WBC # FLD AUTO: 7.08 K/UL — SIGNIFICANT CHANGE UP (ref 3.8–10.5)

## 2023-01-05 PROCEDURE — 71045 X-RAY EXAM CHEST 1 VIEW: CPT | Mod: 26

## 2023-01-05 PROCEDURE — 99285 EMERGENCY DEPT VISIT HI MDM: CPT

## 2023-01-05 RX ORDER — SODIUM CHLORIDE 9 MG/ML
500 INJECTION INTRAMUSCULAR; INTRAVENOUS; SUBCUTANEOUS ONCE
Refills: 0 | Status: COMPLETED | OUTPATIENT
Start: 2023-01-05 | End: 2023-01-05

## 2023-01-05 RX ORDER — ACETAMINOPHEN 500 MG
975 TABLET ORAL ONCE
Refills: 0 | Status: COMPLETED | OUTPATIENT
Start: 2023-01-05 | End: 2023-01-05

## 2023-01-05 RX ORDER — SODIUM CHLORIDE 9 MG/ML
1000 INJECTION INTRAMUSCULAR; INTRAVENOUS; SUBCUTANEOUS ONCE
Refills: 0 | Status: DISCONTINUED | OUTPATIENT
Start: 2023-01-05 | End: 2023-01-05

## 2023-01-05 RX ORDER — MILRINONE LACTATE 1 MG/ML
0.38 INJECTION, SOLUTION INTRAVENOUS
Qty: 20 | Refills: 0 | Status: DISCONTINUED | OUTPATIENT
Start: 2023-01-05 | End: 2023-01-07

## 2023-01-05 RX ADMIN — MILRINONE LACTATE 9 MICROGRAM(S)/KG/MIN: 1 INJECTION, SOLUTION INTRAVENOUS at 13:00

## 2023-01-05 RX ADMIN — SODIUM CHLORIDE 500 MILLILITER(S): 9 INJECTION INTRAMUSCULAR; INTRAVENOUS; SUBCUTANEOUS at 15:00

## 2023-01-05 RX ADMIN — SODIUM CHLORIDE 500 MILLILITER(S): 9 INJECTION INTRAMUSCULAR; INTRAVENOUS; SUBCUTANEOUS at 16:23

## 2023-01-05 RX ADMIN — Medication 975 MILLIGRAM(S): at 22:52

## 2023-01-05 NOTE — ED PROVIDER NOTE - PROGRESS NOTE DETAILS
Spoke to Dr. Reyentovich. Said patient's baseline SBP is 80's-90's Transfer center contacted. Spoke to Dr. Reyentovich. Said patient's baseline SBP is 80's-90's. Called back by [facility] transfer center. Spoke with Dr. Ciro Salmon from heart failure service, who accepted the patient. Transfer center will call me back to let me know if transfer is approved. [facility] Medical Director declined patient. Accepted to MICU at [facility] under Dr. Morrow. Spoke to Dr. Reyentovich, patient's heart failure specialist . Said patient's baseline SBP is 80's-90's.   Phone [phone]

## 2023-01-05 NOTE — ED PROVIDER NOTE - CLINICAL SUMMARY MEDICAL DECISION MAKING FREE TEXT BOX
Patient with watery diarrhea, generalized weakness. Was hypotensive upon arrival. Will get labs, reassess. Patient with watery diarrhea, generalized weakness. Was hypotensive upon arrival. Will get labs, reassess. Ddx: C. diff colitis, other infectious colitis. Diverticulitis and appendicitis less likely due to sx present for 3 days without pain.

## 2023-01-05 NOTE — ED ADULT NURSE NOTE - OBJECTIVE STATEMENT
bibems c/o dizziness this AM, 3 days of diarrhea. pt was found to be hypotensive. Pt is currently on milrinone gtt at home and currently infusing. Pt denies any chest pain, fevers, nausea, numbness/tingling. Pt is a&ox4.

## 2023-01-05 NOTE — ED PROVIDER NOTE - NSICDXPASTSURGICALHX_GEN_ALL_CORE_FT
PAST SURGICAL HISTORY:  H/O right inguinal hernia repair     History of heart valve replacement bioprosthetic aortic and mitral valve,    History of total knee replacement S/P knee replacement, left    S/P TURP (status post transurethral resection of prostate)

## 2023-01-05 NOTE — ED PROVIDER NOTE - OBJECTIVE STATEMENT
Patient reports he was discharged from [facility] 7 days ago after a month long hospitalization starting with infected PICC line, complicated by COVID diagnosis. Patient initally felt well. Three days ago, developed watery diarrhea, 5-10 times per day. Today, felt generally weak. Visiting nurse found that patient's BP was low. Patient has been on continuous milrinone infusion via a picc line for a year. Current picc is 2 weeks old. No fever, cp, sob, ap, n/v Patient reports he was discharged from rehab 7 days ago after a month long hospitalization at [facility] starting with infected PICC line, complicated by COVID diagnosis. Patient initally felt well. Three days ago, developed watery diarrhea, 5-10 times per day. Today, felt generally weak. Visiting nurse found that patient's BP was low. Patient has been on continuous milrinone infusion via a picc line for a year. Current picc is 2 weeks old. No fever, cp, sob, ap, n/v

## 2023-01-05 NOTE — ED PROVIDER NOTE - PHYSICAL EXAMINATION
Gen: Well-developed, well-nourished, NAD, VS as noted by nursing. HEENT: NCAT, mmm   Chest: RRR, nl S1 and S2, no m/r/g. Resp: CTAB, no w/r/r  Abd: nl BS, soft, nt/nd. Ext: Warm, dry. PICC line to LUE  Neuro: CN II-XII intact, normal and equal strength, sensation, and reflexes bilaterally, normal gait  Psych: AAOx3

## 2023-01-05 NOTE — ED PROVIDER NOTE - CARE PLAN
1 Principal Discharge DX:	Dehydration  Secondary Diagnosis:	Hypotension  Secondary Diagnosis:	Diarrhea

## 2023-01-05 NOTE — ED ADULT NURSE REASSESSMENT NOTE - NS ED NURSE REASSESS COMMENT FT1
Received patient from day shift RN. Patient is aox4 and denies any acute pain or distress. Patient remains on complete cardiac monitoring. PICC line noted to left upper arm, per patient-last changed yesterday-noted to be infusing pt's home med Milrinone at 9.7cc/hr. 20G heplock noted to right forearm-patent and intact, free of redness swelling or abnormalities. Patient to be admitted to ICU and is currently awaiting available admission bed. Blood pressure 84 systolic. Per md, this is patients baseline. No distress at this time. Stool sample pending. Comfort measures remain in place.

## 2023-01-05 NOTE — ED ADULT TRIAGE NOTE - CHIEF COMPLAINT QUOTE
patient BIBA from home c/o feeling lightheaded since waking up, diarrhea x3 days, hypotension noted in field and PTA (BP 80's systolic at home)

## 2023-01-05 NOTE — ED ADULT NURSE NOTE - NS ED TRIAGE CLINICAL UPGRADE
LAB WORK NEEDED WITHIN THREE DAYS OF DISCHARGE: Please call your primary doctor to get a referral for lab work in order to obtain repeat Comprehensive Metabolic Panel to  your Liver Function Studies, a Hepatic Function Panel to check your total, indirect and direct bilirubin levels and a Lipid Panel to check your cholesterol levels. Deteriorating patient status - Patient was clinically upgraded due to deteriorating patient status.

## 2023-01-05 NOTE — ED PROVIDER NOTE - TEST CONSIDERED BUT NOT PERFORMED
CT abdomen/pelvis - non-tender abdominal exam, no fever. CT unlikely to be diagnostic Tests Considered But Not Performed

## 2023-01-06 LAB
ALBUMIN SERPL ELPH-MCNC: 3.6 G/DL — SIGNIFICANT CHANGE UP (ref 3.3–5)
ALP SERPL-CCNC: 63 U/L — SIGNIFICANT CHANGE UP (ref 40–120)
ALT FLD-CCNC: 11 U/L — SIGNIFICANT CHANGE UP (ref 10–45)
ANION GAP SERPL CALC-SCNC: 9 MMOL/L — SIGNIFICANT CHANGE UP (ref 5–17)
APPEARANCE UR: CLEAR — SIGNIFICANT CHANGE UP
AST SERPL-CCNC: 17 U/L — SIGNIFICANT CHANGE UP (ref 10–40)
BASOPHILS # BLD AUTO: 0.03 K/UL — SIGNIFICANT CHANGE UP (ref 0–0.2)
BASOPHILS NFR BLD AUTO: 0.4 % — SIGNIFICANT CHANGE UP (ref 0–2)
BILIRUB SERPL-MCNC: 0.7 MG/DL — SIGNIFICANT CHANGE UP (ref 0.2–1.2)
BILIRUB UR-MCNC: NEGATIVE — SIGNIFICANT CHANGE UP
BLD GP AB SCN SERPL QL: NEGATIVE — SIGNIFICANT CHANGE UP
BUN SERPL-MCNC: 24 MG/DL — HIGH (ref 7–23)
C DIFF BY PCR RESULT: SIGNIFICANT CHANGE UP
C DIFF GDH STL QL: NEGATIVE — SIGNIFICANT CHANGE UP
C DIFF GDH STL QL: SIGNIFICANT CHANGE UP
CALCIUM SERPL-MCNC: 8.6 MG/DL — SIGNIFICANT CHANGE UP (ref 8.4–10.5)
CHLORIDE SERPL-SCNC: 103 MMOL/L — SIGNIFICANT CHANGE UP (ref 96–108)
CO2 SERPL-SCNC: 23 MMOL/L — SIGNIFICANT CHANGE UP (ref 22–31)
COLOR SPEC: YELLOW — SIGNIFICANT CHANGE UP
CREAT ?TM UR-MCNC: 131 MG/DL — SIGNIFICANT CHANGE UP
CREAT SERPL-MCNC: 1.36 MG/DL — HIGH (ref 0.5–1.3)
CULTURE RESULTS: SIGNIFICANT CHANGE UP
DIFF PNL FLD: NEGATIVE — SIGNIFICANT CHANGE UP
EGFR: 51 ML/MIN/1.73M2 — LOW
EOSINOPHIL # BLD AUTO: 0.24 K/UL — SIGNIFICANT CHANGE UP (ref 0–0.5)
EOSINOPHIL NFR BLD AUTO: 3.4 % — SIGNIFICANT CHANGE UP (ref 0–6)
GI PCR PANEL: SIGNIFICANT CHANGE UP
GLUCOSE BLDC GLUCOMTR-MCNC: 113 MG/DL — HIGH (ref 70–99)
GLUCOSE BLDC GLUCOMTR-MCNC: 115 MG/DL — HIGH (ref 70–99)
GLUCOSE BLDC GLUCOMTR-MCNC: 123 MG/DL — HIGH (ref 70–99)
GLUCOSE BLDC GLUCOMTR-MCNC: 132 MG/DL — HIGH (ref 70–99)
GLUCOSE SERPL-MCNC: 147 MG/DL — HIGH (ref 70–99)
GLUCOSE UR QL: NEGATIVE — SIGNIFICANT CHANGE UP
HCT VFR BLD CALC: 38.4 % — LOW (ref 39–50)
HGB BLD-MCNC: 12.8 G/DL — LOW (ref 13–17)
IMM GRANULOCYTES NFR BLD AUTO: 0.7 % — SIGNIFICANT CHANGE UP (ref 0–0.9)
KETONES UR-MCNC: NEGATIVE — SIGNIFICANT CHANGE UP
LEUKOCYTE ESTERASE UR-ACNC: NEGATIVE — SIGNIFICANT CHANGE UP
LYMPHOCYTES # BLD AUTO: 0.53 K/UL — LOW (ref 1–3.3)
LYMPHOCYTES # BLD AUTO: 7.6 % — LOW (ref 13–44)
MAGNESIUM SERPL-MCNC: 1.6 MG/DL — SIGNIFICANT CHANGE UP (ref 1.6–2.6)
MCHC RBC-ENTMCNC: 30 PG — SIGNIFICANT CHANGE UP (ref 27–34)
MCHC RBC-ENTMCNC: 33.3 GM/DL — SIGNIFICANT CHANGE UP (ref 32–36)
MCV RBC AUTO: 89.9 FL — SIGNIFICANT CHANGE UP (ref 80–100)
MONOCYTES # BLD AUTO: 0.88 K/UL — SIGNIFICANT CHANGE UP (ref 0–0.9)
MONOCYTES NFR BLD AUTO: 12.6 % — SIGNIFICANT CHANGE UP (ref 2–14)
NEUTROPHILS # BLD AUTO: 5.23 K/UL — SIGNIFICANT CHANGE UP (ref 1.8–7.4)
NEUTROPHILS NFR BLD AUTO: 75.3 % — SIGNIFICANT CHANGE UP (ref 43–77)
NITRITE UR-MCNC: NEGATIVE — SIGNIFICANT CHANGE UP
NRBC # BLD: 0 /100 WBCS — SIGNIFICANT CHANGE UP (ref 0–0)
OSMOLALITY UR: 365 MOSM/KG — SIGNIFICANT CHANGE UP (ref 300–900)
PH UR: 6 — SIGNIFICANT CHANGE UP (ref 5–8)
PHOSPHATE SERPL-MCNC: 3.3 MG/DL — SIGNIFICANT CHANGE UP (ref 2.5–4.5)
PLATELET # BLD AUTO: 180 K/UL — SIGNIFICANT CHANGE UP (ref 150–400)
POTASSIUM SERPL-MCNC: 2.8 MMOL/L — CRITICAL LOW (ref 3.5–5.3)
POTASSIUM SERPL-SCNC: 2.8 MMOL/L — CRITICAL LOW (ref 3.5–5.3)
POTASSIUM UR-SCNC: 28 MMOL/L — SIGNIFICANT CHANGE UP
PROT ?TM UR-MCNC: 17 MG/DL — HIGH (ref 0–12)
PROT SERPL-MCNC: 6.7 G/DL — SIGNIFICANT CHANGE UP (ref 6–8.3)
PROT UR-MCNC: NEGATIVE MG/DL — SIGNIFICANT CHANGE UP
PROT/CREAT UR-RTO: 0.1 RATIO — SIGNIFICANT CHANGE UP (ref 0–0.2)
RBC # BLD: 4.27 M/UL — SIGNIFICANT CHANGE UP (ref 4.2–5.8)
RBC # FLD: 15.5 % — HIGH (ref 10.3–14.5)
RH IG SCN BLD-IMP: POSITIVE — SIGNIFICANT CHANGE UP
SODIUM SERPL-SCNC: 135 MMOL/L — SIGNIFICANT CHANGE UP (ref 135–145)
SODIUM UR-SCNC: 20 MMOL/L — SIGNIFICANT CHANGE UP
SP GR SPEC: 1.01 — SIGNIFICANT CHANGE UP (ref 1–1.03)
SPECIMEN SOURCE: SIGNIFICANT CHANGE UP
T4 AB SER-ACNC: 9.13 UG/DL — SIGNIFICANT CHANGE UP (ref 4.5–11.7)
TSH SERPL-MCNC: 1.82 UIU/ML — SIGNIFICANT CHANGE UP (ref 0.27–4.2)
UROBILINOGEN FLD QL: 0.2 E.U./DL — SIGNIFICANT CHANGE UP
UUN UR-MCNC: 573 MG/DL — SIGNIFICANT CHANGE UP
WBC # BLD: 6.96 K/UL — SIGNIFICANT CHANGE UP (ref 3.8–10.5)
WBC # FLD AUTO: 6.96 K/UL — SIGNIFICANT CHANGE UP (ref 3.8–10.5)

## 2023-01-06 PROCEDURE — 99291 CRITICAL CARE FIRST HOUR: CPT

## 2023-01-06 RX ORDER — CIPROFLOXACIN HCL 0.3 %
1 DROPS OPHTHALMIC (EYE) EVERY 4 HOURS
Refills: 0 | Status: DISCONTINUED | OUTPATIENT
Start: 2023-01-06 | End: 2023-01-07

## 2023-01-06 RX ORDER — LEVOCARNITINE 330 MG/1
0 TABLET ORAL
Qty: 0 | Refills: 0 | DISCHARGE

## 2023-01-06 RX ORDER — MILRINONE LACTATE 1 MG/ML
0.38 INJECTION, SOLUTION INTRAVENOUS
Qty: 20 | Refills: 0 | Status: DISCONTINUED | OUTPATIENT
Start: 2023-01-06 | End: 2023-01-07

## 2023-01-06 RX ORDER — DEXTROSE 50 % IN WATER 50 %
15 SYRINGE (ML) INTRAVENOUS ONCE
Refills: 0 | Status: DISCONTINUED | OUTPATIENT
Start: 2023-01-06 | End: 2023-01-07

## 2023-01-06 RX ORDER — SACUBITRIL AND VALSARTAN 24; 26 MG/1; MG/1
1 TABLET, FILM COATED ORAL
Qty: 0 | Refills: 0 | DISCHARGE

## 2023-01-06 RX ORDER — DEXTROSE 50 % IN WATER 50 %
12.5 SYRINGE (ML) INTRAVENOUS ONCE
Refills: 0 | Status: DISCONTINUED | OUTPATIENT
Start: 2023-01-06 | End: 2023-01-07

## 2023-01-06 RX ORDER — GLUCAGON INJECTION, SOLUTION 0.5 MG/.1ML
1 INJECTION, SOLUTION SUBCUTANEOUS ONCE
Refills: 0 | Status: DISCONTINUED | OUTPATIENT
Start: 2023-01-06 | End: 2023-01-07

## 2023-01-06 RX ORDER — CHLORHEXIDINE GLUCONATE 213 G/1000ML
1 SOLUTION TOPICAL
Refills: 0 | Status: DISCONTINUED | OUTPATIENT
Start: 2023-01-06 | End: 2023-01-07

## 2023-01-06 RX ORDER — SODIUM CHLORIDE 9 MG/ML
1000 INJECTION, SOLUTION INTRAVENOUS
Refills: 0 | Status: DISCONTINUED | OUTPATIENT
Start: 2023-01-06 | End: 2023-01-07

## 2023-01-06 RX ORDER — FUROSEMIDE 40 MG
1 TABLET ORAL
Qty: 0 | Refills: 0 | DISCHARGE

## 2023-01-06 RX ORDER — SPIRONOLACTONE 25 MG/1
1 TABLET, FILM COATED ORAL
Qty: 0 | Refills: 0 | DISCHARGE

## 2023-01-06 RX ORDER — POTASSIUM CHLORIDE 20 MEQ
10 PACKET (EA) ORAL
Refills: 0 | Status: COMPLETED | OUTPATIENT
Start: 2023-01-06 | End: 2023-01-06

## 2023-01-06 RX ORDER — DEXTROSE 50 % IN WATER 50 %
25 SYRINGE (ML) INTRAVENOUS ONCE
Refills: 0 | Status: DISCONTINUED | OUTPATIENT
Start: 2023-01-06 | End: 2023-01-07

## 2023-01-06 RX ORDER — POTASSIUM CHLORIDE 20 MEQ
40 PACKET (EA) ORAL ONCE
Refills: 0 | Status: COMPLETED | OUTPATIENT
Start: 2023-01-06 | End: 2023-01-06

## 2023-01-06 RX ORDER — APIXABAN 2.5 MG/1
5 TABLET, FILM COATED ORAL EVERY 12 HOURS
Refills: 0 | Status: DISCONTINUED | OUTPATIENT
Start: 2023-01-06 | End: 2023-01-07

## 2023-01-06 RX ORDER — CARVEDILOL PHOSPHATE 80 MG/1
1 CAPSULE, EXTENDED RELEASE ORAL
Qty: 0 | Refills: 0 | DISCHARGE

## 2023-01-06 RX ORDER — ACYCLOVIR SODIUM 500 MG
1 VIAL (EA) INTRAVENOUS
Qty: 0 | Refills: 0 | DISCHARGE

## 2023-01-06 RX ORDER — INSULIN LISPRO 100/ML
VIAL (ML) SUBCUTANEOUS
Refills: 0 | Status: DISCONTINUED | OUTPATIENT
Start: 2023-01-06 | End: 2023-01-07

## 2023-01-06 RX ADMIN — Medication 40 MILLIEQUIVALENT(S): at 05:24

## 2023-01-06 RX ADMIN — Medication 40 MILLIEQUIVALENT(S): at 06:43

## 2023-01-06 RX ADMIN — Medication 100 MILLIEQUIVALENT(S): at 06:44

## 2023-01-06 RX ADMIN — Medication 100 MILLIEQUIVALENT(S): at 07:46

## 2023-01-06 RX ADMIN — Medication 1 DROP(S): at 22:14

## 2023-01-06 RX ADMIN — MILRINONE LACTATE 9 MICROGRAM(S)/KG/MIN: 1 INJECTION, SOLUTION INTRAVENOUS at 13:50

## 2023-01-06 RX ADMIN — Medication 100 MILLIEQUIVALENT(S): at 08:50

## 2023-01-06 RX ADMIN — Medication 1 DROP(S): at 18:33

## 2023-01-06 RX ADMIN — APIXABAN 5 MILLIGRAM(S): 2.5 TABLET, FILM COATED ORAL at 17:36

## 2023-01-06 RX ADMIN — MILRINONE LACTATE 9 MICROGRAM(S)/KG/MIN: 1 INJECTION, SOLUTION INTRAVENOUS at 23:20

## 2023-01-06 RX ADMIN — APIXABAN 2.5 MILLIGRAM(S): 2.5 TABLET, FILM COATED ORAL at 05:24

## 2023-01-06 NOTE — DIETITIAN INITIAL EVALUATION ADULT - OTHER CALCULATIONS
Actual body weight used to calculate needs due to pt's current body weight within % ideal body weight adjusted for PCM, wt loss

## 2023-01-06 NOTE — DIETITIAN INITIAL EVALUATION ADULT - OTHER INFO
81 y/o male with PMHx of pulm HTN, CHF (on Milrinone), Afib, aortic & mitral valve replacements, AAA, GERD, and ROHAN (on cpap), with recent month long hospitalization at [facility], now presenting with 3 days of watery diarrhea. Admitted to MICU with concern for septic shock iso dysentery.    Pt care discussed in IDT rounds. Rx and labs reviewed. Pt presents with moderate wasting of the orbital and temporal regions. Pt states that he has sustained a 20 lbs wt loss over unspecified amount of time. Reports UBW of 197 lbs; CBW of 177 lbs which reflects reported 10.2% wt loss. Pt reports feeling very anxious today and some abdominal discomfort described as "gurgling" after sipping on diet ginger ale. Pt with lunch meal tray and limited PO intake. Pt states he has a good appetite at baseline and feels fine today. 24hr recall completed and usual foods consumed include chicken, fish, pasta, and vegetable. Pt reports no nutritional supplementation at baseline other than Metamucil x1/day. Plan today to identify cause of persistent diarrhea and treat accordingly; monitor for improved GI distress and ability to tolerate greater PO intake. Pt is s/p month long hospitalization at [facility] for infected PICC. No c/o difficult chew/swallow. NKA to food. RDN will continue to reassess, intervene, and monitor as appropriate.    Pain: 0 per chart review  GI: Abdomen ND/NT, +BS x4, LBM 1/5  Skin: WDI, +1 B/L feet

## 2023-01-06 NOTE — DIETITIAN NUTRITION RISK NOTIFICATION - ADDITIONAL COMMENTS/DIETITIAN RECOMMENDATIONS
-Continue current diet   -Encourage good PO intake  -Honor food preferences as able  -Once identified etiology of diarrhea; consider banatrol and/or probiotic therapy   -Monitor chemistry, GI fxn, and skin integrity

## 2023-01-06 NOTE — H&P ADULT - NSHPPHYSICALEXAM_GEN_ALL_CORE
VITAL SIGNS:  Vital Signs Last 24 Hrs  T(C): 36.8 (06 Jan 2023 01:40), Max: 36.8 (05 Jan 2023 14:43)  T(F): 98.2 (06 Jan 2023 01:40), Max: 98.2 (05 Jan 2023 14:43)  HR: 79 (06 Jan 2023 04:00) (47 - 87)  BP: 93/57 (06 Jan 2023 04:00) (78/54 - 168/116)  BP(mean): 70 (06 Jan 2023 04:00) (65 - 70)  RR: 23 (06 Jan 2023 04:00) (16 - 23)  SpO2: 95% (06 Jan 2023 04:00) (95% - 100%)    Parameters below as of 06 Jan 2023 04:00  Patient On (Oxygen Delivery Method): room air      PHYSICAL EXAM:  Constitutional: resting comfortably; NAD  HEENT: NC/AT, PERRL, EOMI, anicteric sclera, MMM  Neck: supple; no JVD or thyromegaly  Respiratory: CTA B/L; no W/R/R, no retractions  Cardiac: +S1/S2; RRR; no M/R/G  Gastrointestinal: soft, NT/ND; no rebound or guarding; +BSx4  Extremities: WWP, no clubbing or cyanosis; no peripheral edema  Musculoskeletal: NROM x4; no joint swelling, tenderness or erythema  Vascular: 2+ radial, DP/PT pulses B/L  Dermatologic: skin warm, dry and intact; no rashes, wounds, or scars  Neurologic: AAOx3, no focal deficits  Psychiatric: calm, cooperative, behaviors are appropriate VITAL SIGNS:  Vital Signs Last 24 Hrs  T(C): 36.8 (06 Jan 2023 01:40), Max: 36.8 (05 Jan 2023 14:43)  T(F): 98.2 (06 Jan 2023 01:40), Max: 98.2 (05 Jan 2023 14:43)  HR: 79 (06 Jan 2023 04:00) (47 - 87)  BP: 93/57 (06 Jan 2023 04:00) (78/54 - 168/116)  BP(mean): 70 (06 Jan 2023 04:00) (65 - 70)  RR: 23 (06 Jan 2023 04:00) (16 - 23)  SpO2: 95% (06 Jan 2023 04:00) (95% - 100%)    Parameters below as of 06 Jan 2023 04:00  Patient On (Oxygen Delivery Method): room air      PHYSICAL EXAM:  Constitutional: pleasant elderly male resting comfortably; NAD  HEENT: NC/AT, PERRL, EOMI, anicteric sclera, MMM  Neck: supple, no JVD  Respiratory: CTA B/L; no W/R/R, no retractions  Cardiac: +S1/S2; RRR  Gastrointestinal: soft, NT/ND, +BS, absent McBurney's point, +tympany LUQ/LLQ  Extremities: warm, slow capillary refill, no peripheral edema  Vascular: 2+ radial, DP/PT pulses B/L  Dermatologic: skin warm, dry and intact  Neurologic: AAOx3, no focal deficits, 5/5 strength x4 extremities, sensation intact  Psychiatric: calm, cooperative, behaviors are appropriate

## 2023-01-06 NOTE — DIETITIAN INITIAL EVALUATION ADULT - PERTINENT LABORATORY DATA
01-06    135  |  103  |  24<H>  ----------------------------<  147<H>  2.8<LL>   |  23  |  1.36<H>    Ca    8.6      06 Jan 2023 05:42  Phos  3.3     01-06  Mg     1.6     01-06    TPro  6.7  /  Alb  3.6  /  TBili  0.7  /  DBili  x   /  AST  17  /  ALT  11  /  AlkPhos  63  01-06  POCT Blood Glucose.: 132 mg/dL (01-06-23 @ 10:19)

## 2023-01-06 NOTE — DIETITIAN INITIAL EVALUATION ADULT - PERTINENT MEDS FT
MEDICATIONS  (STANDING):  apixaban 5 milliGRAM(s) Oral every 12 hours  chlorhexidine 2% Cloths 1 Application(s) Topical <User Schedule>  dextrose 5%. 1000 milliLiter(s) (100 mL/Hr) IV Continuous <Continuous>  dextrose 5%. 1000 milliLiter(s) (50 mL/Hr) IV Continuous <Continuous>  dextrose 50% Injectable 25 Gram(s) IV Push once  dextrose 50% Injectable 12.5 Gram(s) IV Push once  dextrose 50% Injectable 25 Gram(s) IV Push once  glucagon  Injectable 1 milliGRAM(s) IntraMuscular once  insulin lispro (ADMELOG) corrective regimen sliding scale   SubCutaneous Before meals and at bedtime  milrinone Infusion 0.375 MICROgram(s)/kG/Min (9 mL/Hr) IV Continuous <Continuous>  milrinone Infusion 0.375 MICROgram(s)/kG/Min (9 mL/Hr) IV Continuous <Continuous>    MEDICATIONS  (PRN):  dextrose Oral Gel 15 Gram(s) Oral once PRN Blood Glucose LESS THAN 70 milliGRAM(s)/deciliter

## 2023-01-06 NOTE — H&P ADULT - SOCIAL HISTORY: ALCOHOL USE
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

## 2023-01-06 NOTE — H&P ADULT - ASSESSMENT
Assessment: 81 y/o male with PMHx of pulm HTN, CHF (on Milrinone), Afib, aortic & mitral valve replacements, AAA, GERD, and ROHAN (on cpap), with recent month long hospitalization at [facility], now presenting with 3 days of watery diarrhea.     NEUROLOGY  #Intubated & Sedated?    CARDIOVASCULAR  #VICKI    PULMONARY  #VICKI    GASTROINTESTINAL  #Nutrition?    RENAL  #VICKI    INFECTIOUS DISEASE  #VICKI    ENDOCRINE  #VICKI    HEME  #VICKI    PREVENTION:  F:  E: Monitor, Replete to K>4 and Mg>2  N:  DVT ppx:  GI ppx:   Lines/Tubes:    CODE STATUS: FULL  DISPO: MICU Assessment: 81 y/o male with PMHx of pulm HTN, CHF (on Milrinone), Afib, aortic & mitral valve replacements, AAA, GERD, and ROHAN (on cpap), with recent month long hospitalization at [facility], now presenting with 3 days of watery diarrhea. Admitted to MICU with concern for septic shock iso dysentery.    NEUROLOGY  #VICKI    CARDIOVASCULAR  #Shock (resolved)  MAP 62. S/p 500cc NS bolus. MAPs back to baseline 65-70. Likely hypovolemic iso diarrhea.    PULMONARY  #VICKI  - On RA    GASTROINTESTINAL  #Diarrhea  Possible C diff given recent hospitalization requiring long-term abx use. Possibly from viral etiology given flu-like symptoms. Wife with similar symptoms.   - Supportive care  - GI PCR neg  - F/u Cdiff  - F/u viral panel    RENAL  #MINNIE  Cr 1.68 on admission. Baseline Cr 1.1 in 10/22. No known history of CKD. Likely 2/2 dehydration iso watery diarrhea x3 days.     INFECTIOUS DISEASE  #Diarrhea  - see GI    ENDOCRINE  - mISS  - FSG q6h  - F/u TSH/free T4    HEME  #VICKI    PREVENTION:  F: s/p 500cc NS bolus  E: Monitor, Replete to K>4 and Mg>2  N: Regular diet  DVT ppx: Eliquis  GI ppx: none  Lines/Tubes: L PICC line    CODE STATUS: FULL  DISPO: MICU Assessment: 81 y/o male with PMHx of pulm HTN, CHF (on Milrinone), Afib, aortic & mitral valve replacements, AAA, GERD, and ROHAN (on cpap), with recent month long hospitalization at [facility], now presenting with 3 days of watery diarrhea. Admitted to MICU with concern for septic shock iso dysentery.    NEUROLOGY  #VICKI    CARDIOVASCULAR  #Shock (resolved)  MAP 62. S/p 500cc NS bolus. MAPs back to baseline 65-70. Likely hypovolemic iso diarrhea.    #HFrEF  Last known EF 25% per [facility]. Home meds: Spironolactone 25 qd, Carvedilol 3.125 bid, and Torsemide 80 bid. Has AICD.  - Hold home meds iso infection    #Afib  Sinus rhythm on monitor. On Eliquis 5 bid and Amiodarone 200 qd at home.   - C/w Eliquis 5 bid  - hold home Amio    PULMONARY  #VICKI  - On RA    GASTROINTESTINAL  #Diarrhea  Possible C diff given recent hospitalization requiring long-term abx use (Ceftriaxone for 6 weeks). Possibly from viral etiology given flu-like symptoms. Wife with similar symptoms.   - Supportive care  - Monitor BMs  - GI PCR neg  - F/u Cdiff  - F/u viral panel    RENAL  #MINNIE  Cr 1.68 on admission. Baseline Cr 1.1 in 10/22. No known history of CKD. Likely 2/2 dehydration iso watery diarrhea x3 days.     INFECTIOUS DISEASE  #Diarrhea  - see GI    ENDOCRINE  - mISS  - FSG q6h  - F/u TSH/free T4    HEME  #VICKI    PREVENTION:  F: s/p 500cc NS bolus  E: Monitor, Replete to K>4 and Mg>2  N: Regular diet  DVT ppx: Eliquis  GI ppx: none  Lines/Tubes: L PICC line    CODE STATUS: FULL  DISPO: MICU Assessment: 81 y/o male with PMHx of pulm HTN, CHF (on Milrinone), Afib, aortic & mitral valve replacements, AAA, GERD, and ROHAN (on cpap), with recent month long hospitalization at [facility], now presenting with 3 days of watery diarrhea. Admitted to MICU with concern for septic shock iso dysentery.    NEUROLOGY  #VICKI    CARDIOVASCULAR  #Shock (resolved)  MAP 62. S/p 500cc NS bolus. MAPs back to baseline 65-70. Likely hypovolemic iso diarrhea.  - S/p 500cc NS  - Now back to baseline BP on Milrinone    #HFrEF  Last known EF 25% per [facility]. Home meds: Spironolactone 25 qd, Carvedilol 3.125 bid, and Torsemide 80 bid. Has AICD.  - Hold home meds iso infection    #Afib  Sinus rhythm on monitor. On Eliquis 5 bid and Amiodarone 200 qd at home.   - C/w Eliquis 5 bid  - hold home Amio    PULMONARY  #VICKI  - On RA    GASTROINTESTINAL  #Diarrhea  Possible C diff given recent hospitalization requiring long-term abx use (Ceftriaxone for 6 weeks). Possibly from viral etiology given flu-like symptoms. Wife with similar symptoms.   - Supportive care  - Monitor BMs  - GI PCR neg  - F/u Cdiff  - F/u viral panel    RENAL  #MINNIE  Cr 1.68 on admission. Baseline Cr 1.1 in 10/22. No known history of CKD. Likely 2/2 dehydration iso watery diarrhea x3 days.     INFECTIOUS DISEASE  #Diarrhea  - see GI    ENDOCRINE  - mISS  - FSG q6h  - F/u TSH/free T4    HEME  #VICKI    PREVENTION:  F: s/p 500cc NS bolus  E: Monitor, Replete to K>4 and Mg>2  N: Regular diet  DVT ppx: Eliquis  GI ppx: none  Lines/Tubes: L PICC line    CODE STATUS: FULL  DISPO: MICU

## 2023-01-06 NOTE — DIETITIAN INITIAL EVALUATION ADULT - NUTRITIONGOAL OUTCOME1
Pt will meet 75% or more of protein/energy needs via most appropriate route for nutrition and reduce/resolve s/sx PCM

## 2023-01-06 NOTE — H&P ADULT - HISTORY OF PRESENT ILLNESS
The patient is a 83 y/o male with PMHx of pulm HTN, CHF (on Milrinone), Afib, aortic & mitral valve replacements, AAA, GERD, and ROHAN (on cpap), with recent month long hospitalization at [facility] for infected PICC line and COVID, presenting with 3 days of watery diarrhea. C/o 5-10 episodes of diarrhea per day. Developed generalized weakness today and visiting nurse found patient with low BP. Patient was discharged from rehab 7 days ago and was feeling fine initially. Has been on continuous Milrinone infusion via PICC line for 1 year. Current PICC is 2 weeks old. Denies fevers, chills, chest pain, SOB, abdominal pain, N/V.     ED Course:  Vitals: 98.3 F, HR 72, /73, RR 23(95%) on Room air -> 16(96%) on 2LNC  Labs: WBC 10.1, Neutrophils 87.7%, Hgb 16.9, Hct 52.9, aPTT 39.9, Gluc 147, Ca 11.1 (cCa: 10.1), Albumin 5.2, , UA, bacteria, nitrite+, leuk esterase +, ketones+, Trops neg  Imaging: CTPE - No PE. COPD w/ prob exacerbation. Bronchiectasis in posterior right lung base. Atelectasis with within the posterior and later right lung base. 11mm ground-glass density within the anterolateral NAT at the junction between lingula and NAT  EKG: NSR, TWI in V1-V3  Consults: None  Interventions: HCTZ 25mg PO x1, CTX 1g IVPB, Methylprednisolone 60mg IVP x1, Zofran 5mg IVP x1, 1L NS, Duoneb x1   The patient is a 81 y/o male with PMHx of pulm HTN, CHF (on Milrinone), Afib, aortic & mitral valve replacements, AAA, GERD, and ROHAN (on cpap), with recent month long hospitalization at [facility] for infected PICC line, COVID, and bacteremia, now presenting with 3 days of watery diarrhea. C/o 5-10 episodes of diarrhea per day. Developed generalized weakness today and visiting nurse found patient with low BP. Patient was discharged from rehab 7 days ago and was feeling fine initially. Has been on continuous Milrinone infusion via PICC line for 1 year. Current PICC is 2 weeks old. Denies fevers, chills, chest pain, SOB, abdominal pain, N/V.     ED Course:  Vitals: 98.3 F, HR 72, /73, RR 23(95%) on Room air -> 16(96%) on 2LNC  Labs: WBC 10.1, Neutrophils 87.7%, Hgb 16.9, Hct 52.9, aPTT 39.9, Gluc 147, Ca 11.1 (cCa: 10.1), Albumin 5.2, , UA, bacteria, nitrite+, leuk esterase +, ketones+, Trops neg  Imaging: CTPE - No PE. COPD w/ prob exacerbation. Bronchiectasis in posterior right lung base. Atelectasis with within the posterior and later right lung base. 11mm ground-glass density within the anterolateral NAT at the junction between lingula and NAT  EKG: NSR, TWI in V1-V3  Consults: None  Interventions: HCTZ 25mg PO x1, CTX 1g IVPB, Methylprednisolone 60mg IVP x1, Zofran 5mg IVP x1, 1L NS, Duoneb x1   The patient is a 83 y/o male with PMHx of pulm HTN, CHF (on Milrinone), Afib, aortic & mitral valve replacements, AAA, GERD, and ROHAN (on cpap), with recent month long hospitalization at [facility], now presenting with 3 days of watery diarrhea. Admits to ~5 episodes of diarrhea per day. Developed generalized weakness today and visiting nurse found patient with low BP. Believes he also developed nasal congestion, fatigue, and dry/irritated eyes around the same time. His wife has similar symptoms that resolved within 2 days. Denies fevers, chills, headache, dizziness, chest pain, SOB, abdominal pain, N/V.      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 Course complicated by COVID. Finished 6 weeks of antibiotics. Patient was discharged from rehab 7 days ago and was feeling fine initially. Has been on continuous Milrinone infusion via PICC line for 1 year. Current PICC is 2 weeks old.     ED Course:  Vitals: 97.7 F (oral), HR 47, BP 78/54, RR 16, 95% on RA  Labs: K 3.4, Cr 1.68, bilirubin 1.3, Trops and lactate neg  EKG: NSR @84 with LBBB and QTc 569  CXR: no acute infiltrates, large cardiac silhouette, AICD present with leads into RA and RV  Interventions: 500cc NS bolus, Tylenol

## 2023-01-06 NOTE — H&P ADULT - NSHPSOCIALHISTORY_GEN_ALL_CORE
[facility] Independent with many hobbies. 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

## 2023-01-06 NOTE — PATIENT PROFILE ADULT - FALL HARM RISK - HARM RISK INTERVENTIONS

## 2023-01-06 NOTE — H&P ADULT - NSHPLABSRESULTS_GEN_ALL_CORE
LABS:                        14.2   7.08  )-----------( 190      ( 05 Jan 2023 12:32 )             42.7     01-05    139  |  102  |  23  ----------------------------<  120<H>  3.4<L>   |  26  |  1.68<H>    Ca    9.2      05 Jan 2023 12:32    TPro  7.9  /  Alb  3.8  /  TBili  1.3<H>  /  DBili  x   /  AST  17  /  ALT  17  /  AlkPhos  72  01-05    RADIOLOGY & ADDITIONAL TESTS:  EKG: NSR @84 with LBBB and QTc 569  CXR: no acute infiltrates, large cardiac silhouette, AICD present with leads into RA and RV

## 2023-01-07 ENCOUNTER — TRANSCRIPTION ENCOUNTER (OUTPATIENT)
Age: 83
End: 2023-01-07

## 2023-01-07 VITALS
SYSTOLIC BLOOD PRESSURE: 96 MMHG | OXYGEN SATURATION: 96 % | RESPIRATION RATE: 22 BRPM | DIASTOLIC BLOOD PRESSURE: 69 MMHG | HEART RATE: 83 BPM

## 2023-01-07 LAB
A1C WITH ESTIMATED AVERAGE GLUCOSE RESULT: 5.7 % — HIGH (ref 4–5.6)
ANION GAP SERPL CALC-SCNC: 14 MMOL/L — SIGNIFICANT CHANGE UP (ref 5–17)
BUN SERPL-MCNC: 15 MG/DL — SIGNIFICANT CHANGE UP (ref 7–23)
CALCIUM SERPL-MCNC: 9.6 MG/DL — SIGNIFICANT CHANGE UP (ref 8.4–10.5)
CHLORIDE SERPL-SCNC: 98 MMOL/L — SIGNIFICANT CHANGE UP (ref 96–108)
CO2 SERPL-SCNC: 21 MMOL/L — LOW (ref 22–31)
CREAT SERPL-MCNC: 0.89 MG/DL — SIGNIFICANT CHANGE UP (ref 0.5–1.3)
EGFR: 84 ML/MIN/1.73M2 — SIGNIFICANT CHANGE UP
ESTIMATED AVERAGE GLUCOSE: 117 MG/DL — HIGH (ref 68–114)
GLUCOSE BLDC GLUCOMTR-MCNC: 101 MG/DL — HIGH (ref 70–99)
GLUCOSE BLDC GLUCOMTR-MCNC: 124 MG/DL — HIGH (ref 70–99)
GLUCOSE SERPL-MCNC: 133 MG/DL — HIGH (ref 70–99)
POTASSIUM SERPL-MCNC: 3.8 MMOL/L — SIGNIFICANT CHANGE UP (ref 3.5–5.3)
POTASSIUM SERPL-SCNC: 3.8 MMOL/L — SIGNIFICANT CHANGE UP (ref 3.5–5.3)
SODIUM SERPL-SCNC: 133 MMOL/L — LOW (ref 135–145)

## 2023-01-07 PROCEDURE — 84133 ASSAY OF URINE POTASSIUM: CPT

## 2023-01-07 PROCEDURE — 85025 COMPLETE CBC W/AUTO DIFF WBC: CPT

## 2023-01-07 PROCEDURE — 81003 URINALYSIS AUTO W/O SCOPE: CPT

## 2023-01-07 PROCEDURE — 84436 ASSAY OF TOTAL THYROXINE: CPT

## 2023-01-07 PROCEDURE — 86900 BLOOD TYPING SEROLOGIC ABO: CPT

## 2023-01-07 PROCEDURE — 83735 ASSAY OF MAGNESIUM: CPT

## 2023-01-07 PROCEDURE — 87045 FECES CULTURE AEROBIC BACT: CPT

## 2023-01-07 PROCEDURE — 84484 ASSAY OF TROPONIN QUANT: CPT

## 2023-01-07 PROCEDURE — 87177 OVA AND PARASITES SMEARS: CPT

## 2023-01-07 PROCEDURE — 71045 X-RAY EXAM CHEST 1 VIEW: CPT

## 2023-01-07 PROCEDURE — 99238 HOSP IP/OBS DSCHRG MGMT 30/<: CPT

## 2023-01-07 PROCEDURE — 84156 ASSAY OF PROTEIN URINE: CPT

## 2023-01-07 PROCEDURE — 83935 ASSAY OF URINE OSMOLALITY: CPT

## 2023-01-07 PROCEDURE — 82962 GLUCOSE BLOOD TEST: CPT

## 2023-01-07 PROCEDURE — 86901 BLOOD TYPING SEROLOGIC RH(D): CPT

## 2023-01-07 PROCEDURE — 87324 CLOSTRIDIUM AG IA: CPT

## 2023-01-07 PROCEDURE — 80048 BASIC METABOLIC PNL TOTAL CA: CPT

## 2023-01-07 PROCEDURE — 87046 STOOL CULTR AEROBIC BACT EA: CPT

## 2023-01-07 PROCEDURE — 99285 EMERGENCY DEPT VISIT HI MDM: CPT | Mod: 25

## 2023-01-07 PROCEDURE — 80053 COMPREHEN METABOLIC PANEL: CPT

## 2023-01-07 PROCEDURE — 87040 BLOOD CULTURE FOR BACTERIA: CPT

## 2023-01-07 PROCEDURE — 82570 ASSAY OF URINE CREATININE: CPT

## 2023-01-07 PROCEDURE — 87507 IADNA-DNA/RNA PROBE TQ 12-25: CPT

## 2023-01-07 PROCEDURE — 36415 COLL VENOUS BLD VENIPUNCTURE: CPT

## 2023-01-07 PROCEDURE — 86850 RBC ANTIBODY SCREEN: CPT

## 2023-01-07 PROCEDURE — 87449 NOS EACH ORGANISM AG IA: CPT

## 2023-01-07 PROCEDURE — 87493 C DIFF AMPLIFIED PROBE: CPT

## 2023-01-07 PROCEDURE — 96361 HYDRATE IV INFUSION ADD-ON: CPT

## 2023-01-07 PROCEDURE — 83605 ASSAY OF LACTIC ACID: CPT

## 2023-01-07 PROCEDURE — 96374 THER/PROPH/DIAG INJ IV PUSH: CPT

## 2023-01-07 PROCEDURE — 84540 ASSAY OF URINE/UREA-N: CPT

## 2023-01-07 PROCEDURE — 84443 ASSAY THYROID STIM HORMONE: CPT

## 2023-01-07 PROCEDURE — 83036 HEMOGLOBIN GLYCOSYLATED A1C: CPT

## 2023-01-07 PROCEDURE — 87077 CULTURE AEROBIC IDENTIFY: CPT

## 2023-01-07 PROCEDURE — 93005 ELECTROCARDIOGRAM TRACING: CPT

## 2023-01-07 PROCEDURE — 84100 ASSAY OF PHOSPHORUS: CPT

## 2023-01-07 PROCEDURE — 84300 ASSAY OF URINE SODIUM: CPT

## 2023-01-07 PROCEDURE — 87637 SARSCOV2&INF A&B&RSV AMP PRB: CPT

## 2023-01-07 RX ORDER — ACETAMINOPHEN 500 MG
650 TABLET ORAL EVERY 6 HOURS
Refills: 0 | Status: DISCONTINUED | OUTPATIENT
Start: 2023-01-07 | End: 2023-01-07

## 2023-01-07 RX ADMIN — CHLORHEXIDINE GLUCONATE 1 APPLICATION(S): 213 SOLUTION TOPICAL at 05:06

## 2023-01-07 RX ADMIN — Medication 650 MILLIGRAM(S): at 04:41

## 2023-01-07 RX ADMIN — Medication 650 MILLIGRAM(S): at 03:41

## 2023-01-07 RX ADMIN — Medication 1 DROP(S): at 14:27

## 2023-01-07 RX ADMIN — APIXABAN 5 MILLIGRAM(S): 2.5 TABLET, FILM COATED ORAL at 05:06

## 2023-01-07 RX ADMIN — Medication 1 DROP(S): at 13:04

## 2023-01-07 RX ADMIN — Medication 1 DROP(S): at 02:29

## 2023-01-07 RX ADMIN — MILRINONE LACTATE 9 MICROGRAM(S)/KG/MIN: 1 INJECTION, SOLUTION INTRAVENOUS at 11:34

## 2023-01-07 RX ADMIN — Medication 650 MILLIGRAM(S): at 14:29

## 2023-01-07 RX ADMIN — Medication 1 DROP(S): at 05:06

## 2023-01-07 RX ADMIN — Medication 650 MILLIGRAM(S): at 14:34

## 2023-01-07 NOTE — DISCHARGE NOTE NURSING/CASE MANAGEMENT/SOCIAL WORK - NSDCPEFALRISK_GEN_ALL_CORE
For information on Fall & Injury Prevention, visit: [email or web address] OR  [email or web address] OR  https://www.cdc.gov/steadi/patient.html

## 2023-01-07 NOTE — DISCHARGE NOTE PROVIDER - DETAILS OF MALNUTRITION DIAGNOSIS/DIAGNOSES
This patient has been assessed with a concern for Malnutrition and was treated during this hospitalization for the following Nutrition diagnosis/diagnoses:     -  01/06/2023: Moderate protein-calorie malnutrition

## 2023-01-07 NOTE — DISCHARGE NOTE NURSING/CASE MANAGEMENT/SOCIAL WORK - PATIENT PORTAL LINK FT
You can access the FollowMyHealth Patient Portal offered by [facility] by registering at the following website: [email or web address]. By joining [employer]’s FollowMyHealth portal, you will also be able to view your health information using other applications (apps) compatible with our system.

## 2023-01-07 NOTE — DISCHARGE NOTE PROVIDER - HOSPITAL COURSE
82Mwith PMHx of CHF (on Milrinone at home), Afib, pulmonary HTN, aortic & mitral valve replacements, AAA, GERD, and ROHAN (on CPAP), with recent month-long hospitalization at [facility] (for infected PICC line c/b bacteremia and presumed infective endocarditis) s/p 6 weeks of antibiotics with d/c to [facility], now presenting from home (7 days post-discharge from [facility]) with hypotension, generalized weakness i/s/o watery diarrhea x 3 days. The patient was admitted to the MICU d/t need for continuous milrinone infusion, which he has required via PICC for 1 year. Hypotensive to 78/54 on admission, elevated Cr to 1.68, lactate negative. CXR WNL. EKG NSR with LBBB. Received NS bolus.       ED Course:  Vitals: 97.7 F (oral), HR 47, BP 78/54, RR 16, 95% on RA  Labs: K 3.4, Cr 1.68, bilirubin 1.3, Trops and lactate neg  EKG: NSR @84 with LBBB and QTc 569  CXR: no acute infiltrates, large cardiac silhouette, AICD present with leads into RA and RV  Interventions: 500cc NS bolus, Tylenol   #Discharge: do not delete  82Mwith PMHx of CHF (on Milrinone at home), Afib, pulmonary HTN, aortic & mitral valve replacements, AAA, GERD, and ROHAN (on CPAP), with recent month-long hospitalization at [facility] (for infected PICC line c/b bacteremia and presumed infective endocarditis) s/p 6 weeks of antibiotics with d/c to [facility], now presenting from home (7 days post-discharge from [facility]) with hypotension, generalized weakness i/s/o watery diarrhea x 3 days. The patient was admitted to the MICU d/t hypotension i/s/o hypovolemia d/t likely viral gastroenteritis and need for continuous milrinone infusion, which he has required via PICC for 1 year. Hypotensive to 78/54 on admission, elevated Cr to 1.68, lactate negative, no leukocytosis. CXR, UA WNL. EKG NSR with LBBB. Infectious diarrhea workup negative. Received NS bolus with improvement in BP. Course c/b MINNIE which also resolved with fluids. Gastrointestinal symptoms resolved and patient has remained normotensive since. Patient was medically optimized, stable and ready for discharge. Plan of care and return precautions were discussed with the patient who verbally stated understanding.    Problem List/Main Diagnoses:   #Hypovolemia  MAP 62 on admission. MAPs back to baseline 65-70 s/p 500cc bolus NS.  - likely due to diarrhea     #Diarrhea  Presented with hypotension after 3 days of watery diarrhea. Likely viral, as bacterial/parasitic panels negative.  - GI PCR panel negative, C. Diff negative, Stool O+P negative  - Upper Respiratory Infection panel negative   - Blood Cx negative     #MINNIE  Cr 1.68 on admission. Baseline Cr 1.1 in 10/22. No known history of CKD. Likely 2/2 dehydration iso watery diarrhea x3 days.  - Cr improved to 0.89 after fluid resuscitation     #HFrEF  [facility] Has [facility]. On home milrinone.   Home meds: Spironolactone 25 qd, Carvedilol 3.125 bid, and Torsemide 80 bid.  - Home meds initially held, resumed after hypovolemia resolved  - c/w home spironolactone 25mg qd, Carvedilol 3.125mg BID, Torsemide 80mg BID.     #AFib  Sinus rhythm. On Eliquis 5 bid and Amiodarone 200 qd at home.   - C/w Eliquis 5mg BID, Amiodarone 200mg qd at home       New medications/therapies: None  New lines/hardware: None  Labs to be followed outpatient: None  Exam to be followed outpatient: None    Discharge plan: discharge to home    Physical Exam Upon Discharge:    T(C): 36.6 (01-07-23 @ 09:00), Max: 36.9 (01-06-23 @ 21:56)  HR: 83 (01-07-23 @ 13:00) (80 - 84)  BP: 100/60 (01-07-23 @ 13:00) (76/57 - 105/66)  RR: 23 (01-07-23 @ 13:00) (16 - 34)  SpO2: 95% (01-07-23 @ 13:00) (93% - 98%)    PHYSICAL EXAM:  Constitutional: pleasant elderly male resting comfortably; NAD  HEENT: NC/AT, PERRL, EOMI, anicteric sclera, MMM  Neck: supple, no JVD  Respiratory: CTA B/L; no W/R/R, no retractions  Cardiac: +S1/S2; RRR  Gastrointestinal: soft, NT/ND, +BS, absent McBurney's point, +tympany LUQ/LLQ  Extremities: warm, well perfused, no peripheral edema  Vascular: 2+ radial, DP/PT pulses B/L  Dermatologic: skin warm, dry and intact  Neurologic: AAOx3, no focal deficits

## 2023-01-07 NOTE — DISCHARGE NOTE PROVIDER - NSDCMRMEDTOKEN_GEN_ALL_CORE_FT
amiodarone 200 mg oral tablet: 1 tab(s) orally once a day  apixaban 5 mg oral tablet: 1 tab(s) orally 2 times a day  carvedilol 3.125 mg oral tablet: 1 tab(s) orally 2 times a day  spironolactone 25 mg oral tablet: 1 tab(s) orally once a day  torsemide 20 mg oral tablet: 4 tab(s) orally 2 times a day

## 2023-01-08 LAB
CULTURE RESULTS: SIGNIFICANT CHANGE UP
SPECIMEN SOURCE: SIGNIFICANT CHANGE UP

## 2023-01-10 LAB
CULTURE RESULTS: SIGNIFICANT CHANGE UP
CULTURE RESULTS: SIGNIFICANT CHANGE UP
SPECIMEN SOURCE: SIGNIFICANT CHANGE UP
SPECIMEN SOURCE: SIGNIFICANT CHANGE UP

## 2023-01-12 DIAGNOSIS — Z88.1 ALLERGY STATUS TO OTHER ANTIBIOTIC AGENTS STATUS: ICD-10-CM

## 2023-01-12 DIAGNOSIS — I50.22 CHRONIC SYSTOLIC (CONGESTIVE) HEART FAILURE: ICD-10-CM

## 2023-01-12 DIAGNOSIS — I48.91 UNSPECIFIED ATRIAL FIBRILLATION: ICD-10-CM

## 2023-01-12 DIAGNOSIS — I95.9 HYPOTENSION, UNSPECIFIED: ICD-10-CM

## 2023-01-12 DIAGNOSIS — I11.0 HYPERTENSIVE HEART DISEASE WITH HEART FAILURE: ICD-10-CM

## 2023-01-12 DIAGNOSIS — R57.9 SHOCK, UNSPECIFIED: ICD-10-CM

## 2023-01-12 DIAGNOSIS — A08.4 VIRAL INTESTINAL INFECTION, UNSPECIFIED: ICD-10-CM

## 2023-01-12 DIAGNOSIS — N17.9 ACUTE KIDNEY FAILURE, UNSPECIFIED: ICD-10-CM

## 2023-01-12 DIAGNOSIS — G47.33 OBSTRUCTIVE SLEEP APNEA (ADULT) (PEDIATRIC): ICD-10-CM

## 2023-01-12 DIAGNOSIS — E44.0 MODERATE PROTEIN-CALORIE MALNUTRITION: ICD-10-CM

## 2023-01-12 DIAGNOSIS — Z95.810 PRESENCE OF AUTOMATIC (IMPLANTABLE) CARDIAC DEFIBRILLATOR: ICD-10-CM

## 2023-01-12 DIAGNOSIS — E86.0 DEHYDRATION: ICD-10-CM

## 2023-01-12 DIAGNOSIS — I27.20 PULMONARY HYPERTENSION, UNSPECIFIED: ICD-10-CM

## 2023-01-12 DIAGNOSIS — K21.9 GASTRO-ESOPHAGEAL REFLUX DISEASE WITHOUT ESOPHAGITIS: ICD-10-CM

## 2023-01-12 DIAGNOSIS — E86.1 HYPOVOLEMIA: ICD-10-CM

## 2023-02-01 ENCOUNTER — NON-APPOINTMENT (OUTPATIENT)
Age: 83
End: 2023-02-01

## 2023-04-10 ENCOUNTER — EMERGENCY (EMERGENCY)
Facility: HOSPITAL | Age: 83
LOS: 1 days | Discharge: HOME | End: 2023-04-10
Attending: EMERGENCY MEDICINE | Admitting: EMERGENCY MEDICINE
Payer: MEDICARE

## 2023-04-10 VITALS
SYSTOLIC BLOOD PRESSURE: 100 MMHG | WEIGHT: 175.93 LBS | HEIGHT: 75 IN | OXYGEN SATURATION: 100 % | TEMPERATURE: 97 F | DIASTOLIC BLOOD PRESSURE: 64 MMHG | RESPIRATION RATE: 19 BRPM | HEART RATE: 75 BPM

## 2023-04-10 VITALS
DIASTOLIC BLOOD PRESSURE: 70 MMHG | OXYGEN SATURATION: 98 % | HEART RATE: 77 BPM | RESPIRATION RATE: 17 BRPM | SYSTOLIC BLOOD PRESSURE: 100 MMHG | TEMPERATURE: 98 F

## 2023-04-10 DIAGNOSIS — Z98.890 OTHER SPECIFIED POSTPROCEDURAL STATES: Chronic | ICD-10-CM

## 2023-04-10 DIAGNOSIS — Z95.2 PRESENCE OF PROSTHETIC HEART VALVE: Chronic | ICD-10-CM

## 2023-04-10 DIAGNOSIS — Z90.79 ACQUIRED ABSENCE OF OTHER GENITAL ORGAN(S): Chronic | ICD-10-CM

## 2023-04-10 LAB
ALBUMIN SERPL ELPH-MCNC: 3.8 G/DL — SIGNIFICANT CHANGE UP (ref 3.4–5)
ALP SERPL-CCNC: 66 U/L — SIGNIFICANT CHANGE UP (ref 40–120)
ALT FLD-CCNC: 18 U/L — SIGNIFICANT CHANGE UP (ref 12–42)
ANION GAP SERPL CALC-SCNC: 7 MMOL/L — LOW (ref 9–16)
APTT BLD: 35.6 SEC — HIGH (ref 27.5–35.5)
AST SERPL-CCNC: 27 U/L — SIGNIFICANT CHANGE UP (ref 15–37)
BASOPHILS # BLD AUTO: 0 K/UL — SIGNIFICANT CHANGE UP (ref 0–0.2)
BASOPHILS NFR BLD AUTO: 0 % — SIGNIFICANT CHANGE UP (ref 0–2)
BILIRUB SERPL-MCNC: 0.8 MG/DL — SIGNIFICANT CHANGE UP (ref 0.2–1.2)
BUN SERPL-MCNC: 42 MG/DL — HIGH (ref 7–23)
CALCIUM SERPL-MCNC: 9.3 MG/DL — SIGNIFICANT CHANGE UP (ref 8.5–10.5)
CHLORIDE SERPL-SCNC: 104 MMOL/L — SIGNIFICANT CHANGE UP (ref 96–108)
CO2 SERPL-SCNC: 31 MMOL/L — SIGNIFICANT CHANGE UP (ref 22–31)
CREAT SERPL-MCNC: 2.19 MG/DL — HIGH (ref 0.5–1.3)
CRP SERPL-MCNC: 13 MG/L — HIGH (ref 0–9)
EGFR: 29 ML/MIN/1.73M2 — LOW
EOSINOPHIL # BLD AUTO: 0 K/UL — SIGNIFICANT CHANGE UP (ref 0–0.5)
EOSINOPHIL NFR BLD AUTO: 0 % — SIGNIFICANT CHANGE UP (ref 0–6)
FLUAV H1 2009 PAND RNA SPEC QL NAA+PROBE: SIGNIFICANT CHANGE UP
FLUAV H1 RNA SPEC QL NAA+PROBE: SIGNIFICANT CHANGE UP
FLUAV H3 RNA SPEC QL NAA+PROBE: SIGNIFICANT CHANGE UP
FLUAV SUBTYP SPEC NAA+PROBE: SIGNIFICANT CHANGE UP
FLUBV RNA SPEC QL NAA+PROBE: SIGNIFICANT CHANGE UP
GLUCOSE SERPL-MCNC: 114 MG/DL — HIGH (ref 70–99)
HCT VFR BLD CALC: 37.8 % — LOW (ref 39–50)
HGB BLD-MCNC: 12 G/DL — LOW (ref 13–17)
INR BLD: 2.14 — HIGH (ref 0.88–1.16)
LYMPHOCYTES # BLD AUTO: 1.26 K/UL — SIGNIFICANT CHANGE UP (ref 1–3.3)
LYMPHOCYTES # BLD AUTO: 28 % — SIGNIFICANT CHANGE UP (ref 13–44)
MAGNESIUM SERPL-MCNC: 2.4 MG/DL — SIGNIFICANT CHANGE UP (ref 1.6–2.6)
MANUAL SMEAR VERIFICATION: SIGNIFICANT CHANGE UP
MCHC RBC-ENTMCNC: 29.8 PG — SIGNIFICANT CHANGE UP (ref 27–34)
MCHC RBC-ENTMCNC: 31.7 GM/DL — LOW (ref 32–36)
MCV RBC AUTO: 93.8 FL — SIGNIFICANT CHANGE UP (ref 80–100)
MONOCYTES # BLD AUTO: 0.18 K/UL — SIGNIFICANT CHANGE UP (ref 0–0.9)
MONOCYTES NFR BLD AUTO: 4 % — SIGNIFICANT CHANGE UP (ref 2–14)
NEUTROPHILS # BLD AUTO: 3.06 K/UL — SIGNIFICANT CHANGE UP (ref 1.8–7.4)
NEUTROPHILS NFR BLD AUTO: 67 % — SIGNIFICANT CHANGE UP (ref 43–77)
NEUTS BAND # BLD: 1 % — SIGNIFICANT CHANGE UP (ref 0–8)
NRBC # BLD: 0 /100 — SIGNIFICANT CHANGE UP (ref 0–0)
NRBC # BLD: SIGNIFICANT CHANGE UP /100 WBCS (ref 0–0)
NT-PROBNP SERPL-SCNC: HIGH PG/ML
PLAT MORPH BLD: NORMAL — SIGNIFICANT CHANGE UP
PLATELET # BLD AUTO: 187 K/UL — SIGNIFICANT CHANGE UP (ref 150–400)
POTASSIUM SERPL-MCNC: 4.2 MMOL/L — SIGNIFICANT CHANGE UP (ref 3.5–5.3)
POTASSIUM SERPL-SCNC: 4.2 MMOL/L — SIGNIFICANT CHANGE UP (ref 3.5–5.3)
PROT SERPL-MCNC: 7.5 G/DL — SIGNIFICANT CHANGE UP (ref 6.4–8.2)
PROTHROM AB SERPL-ACNC: 25.2 SEC — HIGH (ref 10.5–13.4)
RAPID RVP RESULT: SIGNIFICANT CHANGE UP
RBC # BLD: 4.03 M/UL — LOW (ref 4.2–5.8)
RBC # FLD: 16.4 % — HIGH (ref 10.3–14.5)
RBC BLD AUTO: NORMAL — SIGNIFICANT CHANGE UP
SARS-COV-2 RNA SPEC QL NAA+PROBE: SIGNIFICANT CHANGE UP
SODIUM SERPL-SCNC: 142 MMOL/L — SIGNIFICANT CHANGE UP (ref 132–145)
TSH SERPL-MCNC: 2.14 UIU/ML — SIGNIFICANT CHANGE UP (ref 0.36–3.74)
WBC # BLD: 4.5 K/UL — SIGNIFICANT CHANGE UP (ref 3.8–10.5)
WBC # FLD AUTO: 4.5 K/UL — SIGNIFICANT CHANGE UP (ref 3.8–10.5)

## 2023-04-10 PROCEDURE — 70450 CT HEAD/BRAIN W/O DYE: CPT | Mod: 26,MH

## 2023-04-10 PROCEDURE — 99285 EMERGENCY DEPT VISIT HI MDM: CPT

## 2023-04-10 PROCEDURE — 72125 CT NECK SPINE W/O DYE: CPT | Mod: 26,MH

## 2023-04-10 PROCEDURE — 71045 X-RAY EXAM CHEST 1 VIEW: CPT | Mod: 26

## 2023-04-10 RX ORDER — METHOCARBAMOL 500 MG/1
1 TABLET, FILM COATED ORAL
Qty: 15 | Refills: 0
Start: 2023-04-10 | End: 2023-04-14

## 2023-04-10 NOTE — ED PROVIDER NOTE - PATIENT PORTAL LINK FT
You can access the FollowMyHealth Patient Portal offered by [facility] by registering at the following website: [email or web address]. By joining [employer]’s FollowMyHealth portal, you will also be able to view your health information using other applications (apps) compatible with our system.

## 2023-04-10 NOTE — ED PROVIDER NOTE - NSFOLLOWUPINSTRUCTIONS_ED_ALL_ED_FT
Headache    A headache is pain or discomfort felt around the head or neck area. The specific cause of a headache may not be found as there are many types including tension headaches, migraine headaches, and cluster headaches. Watch your condition for any changes. Things you can do to manage your pain include taking over the counter and prescription medications as instructed by your health care provider, lying down in a dark quiet room, limiting stress, getting regular sleep, and refraining from alcohol and tobacco products.    SEEK IMMEDIATE MEDICAL CARE IF YOU HAVE ANY OF THE FOLLOWING SYMPTOMS: fever, vomiting, stiff neck, loss of vision, problems with speech, muscle weakness, loss of balance, trouble walking, passing out, or confusion.    Strain    A strain is a stretch or tear in one of the muscles in your body. This is caused by an injury to the area such as a twisting mechanism. Symptoms include pain, swelling, or bruising. Rest that area over the next several days and slowly resume activity when tolerated. Ice can help with swelling and pain.     SEEK IMMEDIATE MEDICAL CARE IF YOU HAVE ANY OF THE FOLLOWING SYMPTOMS: worsening pain, inability to move that body part, numbness or tingling.

## 2023-04-10 NOTE — ED ADULT TRIAGE NOTE - CHIEF COMPLAINT QUOTE
Pt walk in complaining of 4-5 days of HA. Denies falls or trauma. Has continuous infusion of milrinone for HF. Denies CP or SOB. BEFAST negative. Pt walk in walker complaining of 4-5 days of HA. Denies falls or trauma. Has continuous infusion of milrinone for HF and ICD. Denies CP. BEFAST negative.

## 2023-04-10 NOTE — ED PROVIDER NOTE - OBJECTIVE STATEMENT
83 yo male pt, hx of HTN, CHF, on amiodarone, eliquis, spironolactone, torsemide, Klor, propecia/finasteride, PICC w Milrinone. F/U at [facility]. Allergic to Azithro (rash). Pt presents for intermitent L sided pressure headache, feels the pain comes from upper back/left neck. Improved w tylenol. Some allergy symptoms including runny nose and itchy eyes. no paresthesias, no numbness, no focal weakness, no blurred vision. no fever, no chills. Spoke to PMD who recommended imaging to work up headache.

## 2023-04-10 NOTE — ED PROVIDER NOTE - PROGRESS NOTE DETAILS
mild MINNIE on labs, informed for f/u, chronically elevated bnp likely, no signs of fluid overload. HCT no acute changes, Cervical spine changes explained. Suspect referred pain from neck area into the head. Rec spone f/u, likely will need PT, also robaxin/tylenol as needed for pain. Pt is well appearing and seeking dc. COpy of results provided for f/u

## 2023-04-10 NOTE — ED ADULT NURSE NOTE - CAS TRG GENERAL NORM CIRC DET
Pediatric ENT interval history and physical exam note    I reviewed the patient's history and physical exam.    The information is up-to-date, with the following exceptions:  None.      Lonnie Antoine M.D.  Pediatric Otolaryngology,  [facility])  office  [phone]  fax      [phone]  pager  [phone]     Strong peripheral pulses/Capillary refill less/equal to 2 seconds

## 2023-04-10 NOTE — ED ADULT NURSE NOTE - CHIEF COMPLAINT QUOTE
Pt walk in walker complaining of 4-5 days of HA. Denies falls or trauma. Has continuous infusion of milrinone for HF and ICD. Denies CP. BEFAST negative.

## 2023-04-10 NOTE — ED ADULT NURSE NOTE - OBJECTIVE STATEMENT
c/o headache x1 week. denies chest pain. Had SOB yesterday. Denies nausea, vomiting, fevers. Has milrinone gtt running from home (has had on x1 yr). reports that HA has now subsided as he took 3 tylenol around 0900am today.

## 2023-04-10 NOTE — ED PROVIDER NOTE - CARE PROVIDER_API CALL
Jairo Mireles)  PhysicalRehab Medicine  [address] [place] [address]  Phone: [phone]  Fax: [phone]  Follow Up Time:

## 2023-04-12 DIAGNOSIS — I11.0 HYPERTENSIVE HEART DISEASE WITH HEART FAILURE: ICD-10-CM

## 2023-04-12 DIAGNOSIS — R51.9 HEADACHE, UNSPECIFIED: ICD-10-CM

## 2023-04-12 DIAGNOSIS — I44.7 LEFT BUNDLE-BRANCH BLOCK, UNSPECIFIED: ICD-10-CM

## 2023-04-12 DIAGNOSIS — M54.2 CERVICALGIA: ICD-10-CM

## 2023-04-12 DIAGNOSIS — Z88.1 ALLERGY STATUS TO OTHER ANTIBIOTIC AGENTS STATUS: ICD-10-CM

## 2023-04-12 DIAGNOSIS — I50.9 HEART FAILURE, UNSPECIFIED: ICD-10-CM

## 2023-04-12 DIAGNOSIS — Z20.822 CONTACT WITH AND (SUSPECTED) EXPOSURE TO COVID-19: ICD-10-CM

## 2023-04-12 DIAGNOSIS — Z79.01 LONG TERM (CURRENT) USE OF ANTICOAGULANTS: ICD-10-CM

## 2023-06-08 NOTE — ED ADULT NURSE NOTE - NSIMPLEMENTINTERV_GEN_ALL_ED
Alert-The patient is alert, awake and responds to voice. The patient is oriented to time, place, and person. The triage nurse is able to obtain subjective information.
Implemented All Universal Safety Interventions:  [place] to call system. Call bell, personal items and telephone within reach. Instruct patient to call for assistance. Room bathroom lighting operational. Non-slip footwear when patient is off stretcher. Physically safe environment: no spills, clutter or unnecessary equipment. Stretcher in lowest position, wheels locked, appropriate side rails in place.

## 2023-08-24 ENCOUNTER — EMERGENCY (EMERGENCY)
Facility: HOSPITAL | Age: 83
LOS: 1 days | Discharge: ANOTHER HOSPITAL | End: 2023-08-24
Attending: EMERGENCY MEDICINE | Admitting: EMERGENCY MEDICINE
Payer: MEDICARE

## 2023-08-24 VITALS — SYSTOLIC BLOOD PRESSURE: 90 MMHG | OXYGEN SATURATION: 90 % | HEART RATE: 106 BPM | DIASTOLIC BLOOD PRESSURE: 59 MMHG

## 2023-08-24 VITALS
OXYGEN SATURATION: 95 % | HEIGHT: 76 IN | DIASTOLIC BLOOD PRESSURE: 55 MMHG | TEMPERATURE: 98 F | RESPIRATION RATE: 20 BRPM | WEIGHT: 205.91 LBS | HEART RATE: 130 BPM | SYSTOLIC BLOOD PRESSURE: 72 MMHG

## 2023-08-24 DIAGNOSIS — Z98.890 OTHER SPECIFIED POSTPROCEDURAL STATES: ICD-10-CM

## 2023-08-24 DIAGNOSIS — Z87.39 PERSONAL HISTORY OF OTHER DISEASES OF THE MUSCULOSKELETAL SYSTEM AND CONNECTIVE TISSUE: ICD-10-CM

## 2023-08-24 DIAGNOSIS — Z86.79 PERSONAL HISTORY OF OTHER DISEASES OF THE CIRCULATORY SYSTEM: ICD-10-CM

## 2023-08-24 DIAGNOSIS — Z95.4 PRESENCE OF OTHER HEART-VALVE REPLACEMENT: ICD-10-CM

## 2023-08-24 DIAGNOSIS — R65.20 SEVERE SEPSIS WITHOUT SEPTIC SHOCK: ICD-10-CM

## 2023-08-24 DIAGNOSIS — Z90.79 ACQUIRED ABSENCE OF OTHER GENITAL ORGAN(S): Chronic | ICD-10-CM

## 2023-08-24 DIAGNOSIS — I27.20 PULMONARY HYPERTENSION, UNSPECIFIED: ICD-10-CM

## 2023-08-24 DIAGNOSIS — Z86.19 PERSONAL HISTORY OF OTHER INFECTIOUS AND PARASITIC DISEASES: ICD-10-CM

## 2023-08-24 DIAGNOSIS — A41.9 SEPSIS, UNSPECIFIED ORGANISM: ICD-10-CM

## 2023-08-24 DIAGNOSIS — Z95.2 PRESENCE OF PROSTHETIC HEART VALVE: Chronic | ICD-10-CM

## 2023-08-24 DIAGNOSIS — I50.9 HEART FAILURE, UNSPECIFIED: ICD-10-CM

## 2023-08-24 DIAGNOSIS — G47.33 OBSTRUCTIVE SLEEP APNEA (ADULT) (PEDIATRIC): ICD-10-CM

## 2023-08-24 DIAGNOSIS — Z90.79 ACQUIRED ABSENCE OF OTHER GENITAL ORGAN(S): ICD-10-CM

## 2023-08-24 DIAGNOSIS — I48.91 UNSPECIFIED ATRIAL FIBRILLATION: ICD-10-CM

## 2023-08-24 DIAGNOSIS — R00.0 TACHYCARDIA, UNSPECIFIED: ICD-10-CM

## 2023-08-24 DIAGNOSIS — Z85.828 PERSONAL HISTORY OF OTHER MALIGNANT NEOPLASM OF SKIN: ICD-10-CM

## 2023-08-24 DIAGNOSIS — R06.02 SHORTNESS OF BREATH: ICD-10-CM

## 2023-08-24 DIAGNOSIS — Z88.1 ALLERGY STATUS TO OTHER ANTIBIOTIC AGENTS STATUS: ICD-10-CM

## 2023-08-24 DIAGNOSIS — Z98.890 OTHER SPECIFIED POSTPROCEDURAL STATES: Chronic | ICD-10-CM

## 2023-08-24 DIAGNOSIS — Z87.19 PERSONAL HISTORY OF OTHER DISEASES OF THE DIGESTIVE SYSTEM: ICD-10-CM

## 2023-08-24 LAB
ALBUMIN SERPL ELPH-MCNC: 3.4 G/DL — SIGNIFICANT CHANGE UP (ref 3.4–5)
ALP SERPL-CCNC: 66 U/L — SIGNIFICANT CHANGE UP (ref 40–120)
ALT FLD-CCNC: 25 U/L — SIGNIFICANT CHANGE UP (ref 12–42)
ANION GAP SERPL CALC-SCNC: 11 MMOL/L — SIGNIFICANT CHANGE UP (ref 9–16)
APPEARANCE UR: CLEAR — SIGNIFICANT CHANGE UP
APTT BLD: 26.8 SEC — SIGNIFICANT CHANGE UP (ref 24.5–35.6)
AST SERPL-CCNC: 40 U/L — HIGH (ref 15–37)
BASOPHILS # BLD AUTO: 0 K/UL — SIGNIFICANT CHANGE UP (ref 0–0.2)
BASOPHILS NFR BLD AUTO: 0 % — SIGNIFICANT CHANGE UP (ref 0–2)
BILIRUB SERPL-MCNC: 1 MG/DL — SIGNIFICANT CHANGE UP (ref 0.2–1.2)
BILIRUB UR-MCNC: NEGATIVE — SIGNIFICANT CHANGE UP
BUN SERPL-MCNC: 37 MG/DL — HIGH (ref 7–23)
CALCIUM SERPL-MCNC: 8.3 MG/DL — LOW (ref 8.5–10.5)
CHLORIDE SERPL-SCNC: 101 MMOL/L — SIGNIFICANT CHANGE UP (ref 96–108)
CO2 SERPL-SCNC: 26 MMOL/L — SIGNIFICANT CHANGE UP (ref 22–31)
COLOR SPEC: YELLOW — SIGNIFICANT CHANGE UP
CREAT SERPL-MCNC: 1.9 MG/DL — HIGH (ref 0.5–1.3)
DIFF PNL FLD: NEGATIVE — SIGNIFICANT CHANGE UP
EGFR: 34 ML/MIN/1.73M2 — LOW
EOSINOPHIL # BLD AUTO: 0 K/UL — SIGNIFICANT CHANGE UP (ref 0–0.5)
EOSINOPHIL NFR BLD AUTO: 0 % — SIGNIFICANT CHANGE UP (ref 0–6)
FLUAV AG NPH QL: SIGNIFICANT CHANGE UP
FLUBV AG NPH QL: SIGNIFICANT CHANGE UP
GLUCOSE SERPL-MCNC: 99 MG/DL — SIGNIFICANT CHANGE UP (ref 70–99)
GLUCOSE UR QL: NEGATIVE MG/DL — SIGNIFICANT CHANGE UP
HCT VFR BLD CALC: 42.3 % — SIGNIFICANT CHANGE UP (ref 39–50)
HGB BLD-MCNC: 13.8 G/DL — SIGNIFICANT CHANGE UP (ref 13–17)
INR BLD: 1.47 — HIGH (ref 0.85–1.18)
KETONES UR-MCNC: NEGATIVE MG/DL — SIGNIFICANT CHANGE UP
LACTATE BLDV-MCNC: 1 MMOL/L — SIGNIFICANT CHANGE UP (ref 0.5–2)
LACTATE BLDV-MCNC: 3.1 MMOL/L — HIGH (ref 0.5–2)
LEUKOCYTE ESTERASE UR-ACNC: NEGATIVE — SIGNIFICANT CHANGE UP
LYMPHOCYTES # BLD AUTO: 0.14 K/UL — LOW (ref 1–3.3)
LYMPHOCYTES # BLD AUTO: 2 % — LOW (ref 13–44)
MANUAL SMEAR VERIFICATION: SIGNIFICANT CHANGE UP
MCHC RBC-ENTMCNC: 31.4 PG — SIGNIFICANT CHANGE UP (ref 27–34)
MCHC RBC-ENTMCNC: 32.6 GM/DL — SIGNIFICANT CHANGE UP (ref 32–36)
MCV RBC AUTO: 96.4 FL — SIGNIFICANT CHANGE UP (ref 80–100)
MONOCYTES # BLD AUTO: 0.14 K/UL — SIGNIFICANT CHANGE UP (ref 0–0.9)
MONOCYTES NFR BLD AUTO: 2 % — SIGNIFICANT CHANGE UP (ref 2–14)
NEUTROPHILS # BLD AUTO: 6.78 K/UL — SIGNIFICANT CHANGE UP (ref 1.8–7.4)
NEUTROPHILS NFR BLD AUTO: 70 % — SIGNIFICANT CHANGE UP (ref 43–77)
NEUTS BAND # BLD: 26 % — HIGH (ref 0–8)
NITRITE UR-MCNC: NEGATIVE — SIGNIFICANT CHANGE UP
NRBC # BLD: 0 /100 — SIGNIFICANT CHANGE UP (ref 0–0)
NRBC # BLD: SIGNIFICANT CHANGE UP /100 WBCS (ref 0–0)
NT-PROBNP SERPL-SCNC: HIGH PG/ML
PH UR: 5.5 — SIGNIFICANT CHANGE UP (ref 5–8)
PLAT MORPH BLD: NORMAL — SIGNIFICANT CHANGE UP
PLATELET # BLD AUTO: 168 K/UL — SIGNIFICANT CHANGE UP (ref 150–400)
POTASSIUM SERPL-MCNC: 4.3 MMOL/L — SIGNIFICANT CHANGE UP (ref 3.5–5.3)
POTASSIUM SERPL-SCNC: 4.3 MMOL/L — SIGNIFICANT CHANGE UP (ref 3.5–5.3)
PROT SERPL-MCNC: 7.3 G/DL — SIGNIFICANT CHANGE UP (ref 6.4–8.2)
PROT UR-MCNC: NEGATIVE MG/DL — SIGNIFICANT CHANGE UP
PROTHROM AB SERPL-ACNC: 16 SEC — HIGH (ref 9.5–13)
RBC # BLD: 4.39 M/UL — SIGNIFICANT CHANGE UP (ref 4.2–5.8)
RBC # FLD: 17.5 % — HIGH (ref 10.3–14.5)
RBC BLD AUTO: NORMAL — SIGNIFICANT CHANGE UP
RSV RNA NPH QL NAA+NON-PROBE: SIGNIFICANT CHANGE UP
SARS-COV-2 RNA SPEC QL NAA+PROBE: SIGNIFICANT CHANGE UP
SODIUM SERPL-SCNC: 138 MMOL/L — SIGNIFICANT CHANGE UP (ref 132–145)
SP GR SPEC: 1.01 — SIGNIFICANT CHANGE UP (ref 1–1.03)
TROPONIN I, HIGH SENSITIVITY RESULT: 278.9 NG/L — HIGH
UROBILINOGEN FLD QL: 1 MG/DL — SIGNIFICANT CHANGE UP (ref 0.2–1)
WBC # BLD: 7.06 K/UL — SIGNIFICANT CHANGE UP (ref 3.8–10.5)
WBC # FLD AUTO: 7.06 K/UL — SIGNIFICANT CHANGE UP (ref 3.8–10.5)

## 2023-08-24 PROCEDURE — 71045 X-RAY EXAM CHEST 1 VIEW: CPT | Mod: 26

## 2023-08-24 PROCEDURE — 99291 CRITICAL CARE FIRST HOUR: CPT

## 2023-08-24 RX ORDER — NOREPINEPHRINE BITARTRATE/D5W 8 MG/250ML
0.03 PLASTIC BAG, INJECTION (ML) INTRAVENOUS
Qty: 8 | Refills: 0 | Status: DISCONTINUED | OUTPATIENT
Start: 2023-08-24 | End: 2023-08-27

## 2023-08-24 RX ORDER — VASOPRESSIN 20 [USP'U]/ML
0.04 INJECTION INTRAVENOUS
Qty: 40 | Refills: 0 | Status: DISCONTINUED | OUTPATIENT
Start: 2023-08-24 | End: 2023-08-27

## 2023-08-24 RX ORDER — PIPERACILLIN AND TAZOBACTAM 4; .5 G/20ML; G/20ML
3.38 INJECTION, POWDER, LYOPHILIZED, FOR SOLUTION INTRAVENOUS ONCE
Refills: 0 | Status: COMPLETED | OUTPATIENT
Start: 2023-08-24 | End: 2023-08-24

## 2023-08-24 RX ORDER — ACETAMINOPHEN 500 MG
325 TABLET ORAL ONCE
Refills: 0 | Status: COMPLETED | OUTPATIENT
Start: 2023-08-24 | End: 2023-08-24

## 2023-08-24 RX ORDER — NOREPINEPHRINE BITARTRATE/D5W 8 MG/250ML
0.08 PLASTIC BAG, INJECTION (ML) INTRAVENOUS
Qty: 8 | Refills: 0 | Status: DISCONTINUED | OUTPATIENT
Start: 2023-08-24 | End: 2023-08-24

## 2023-08-24 RX ORDER — ACETAMINOPHEN 500 MG
650 TABLET ORAL ONCE
Refills: 0 | Status: COMPLETED | OUTPATIENT
Start: 2023-08-24 | End: 2023-08-24

## 2023-08-24 RX ORDER — SODIUM CHLORIDE 9 MG/ML
1000 INJECTION INTRAMUSCULAR; INTRAVENOUS; SUBCUTANEOUS
Refills: 0 | Status: DISCONTINUED | OUTPATIENT
Start: 2023-08-24 | End: 2023-08-24

## 2023-08-24 RX ORDER — VANCOMYCIN HCL 1 G
1000 VIAL (EA) INTRAVENOUS ONCE
Refills: 0 | Status: DISCONTINUED | OUTPATIENT
Start: 2023-08-24 | End: 2023-08-24

## 2023-08-24 RX ORDER — SODIUM CHLORIDE 9 MG/ML
500 INJECTION, SOLUTION INTRAVENOUS ONCE
Refills: 0 | Status: COMPLETED | OUTPATIENT
Start: 2023-08-24 | End: 2023-08-24

## 2023-08-24 RX ORDER — PHENYLEPHRINE HYDROCHLORIDE 10 MG/ML
5 INJECTION INTRAVENOUS
Qty: 40 | Refills: 0 | Status: DISCONTINUED | OUTPATIENT
Start: 2023-08-24 | End: 2023-08-27

## 2023-08-24 RX ORDER — VANCOMYCIN HCL 1 G
1500 VIAL (EA) INTRAVENOUS ONCE
Refills: 0 | Status: COMPLETED | OUTPATIENT
Start: 2023-08-24 | End: 2023-08-24

## 2023-08-24 RX ORDER — MAGNESIUM SULFATE 500 MG/ML
2 VIAL (ML) INJECTION ONCE
Refills: 0 | Status: DISCONTINUED | OUTPATIENT
Start: 2023-08-24 | End: 2023-08-27

## 2023-08-24 RX ORDER — PIPERACILLIN AND TAZOBACTAM 4; .5 G/20ML; G/20ML
3.38 INJECTION, POWDER, LYOPHILIZED, FOR SOLUTION INTRAVENOUS ONCE
Refills: 0 | Status: DISCONTINUED | OUTPATIENT
Start: 2023-08-24 | End: 2023-08-27

## 2023-08-24 RX ADMIN — PHENYLEPHRINE HYDROCHLORIDE 175 MICROGRAM(S)/KG/MIN: 10 INJECTION INTRAVENOUS at 11:25

## 2023-08-24 RX ADMIN — Medication 650 MILLIGRAM(S): at 07:50

## 2023-08-24 RX ADMIN — VASOPRESSIN 6 UNIT(S)/MIN: 20 INJECTION INTRAVENOUS at 09:51

## 2023-08-24 RX ADMIN — Medication 300 MILLIGRAM(S): at 09:03

## 2023-08-24 RX ADMIN — PIPERACILLIN AND TAZOBACTAM 200 GRAM(S): 4; .5 INJECTION, POWDER, LYOPHILIZED, FOR SOLUTION INTRAVENOUS at 07:55

## 2023-08-24 RX ADMIN — SODIUM CHLORIDE 2000 MILLILITER(S): 9 INJECTION, SOLUTION INTRAVENOUS at 11:40

## 2023-08-24 RX ADMIN — Medication 325 MILLIGRAM(S): at 09:52

## 2023-08-24 RX ADMIN — Medication 14 MICROGRAM(S)/KG/MIN: at 09:25

## 2023-08-24 NOTE — ED PROVIDER NOTE - CRITICAL CARE ATTENDING CONTRIBUTION TO CARE
The patient was seen immediately upon arrival due to a high probability of imminent or life-threatening deterioration secondary to septic shock, which required my direct attention, intervention, and personal management at the bedside. I have personally provided critical care time exclusive of time spent on separately billable procedures. Time includes review of laboratory data, radiology results, discussion with consultants, discussion with the patient's family, and monitoring for potential decompensation.

## 2023-08-24 NOTE — ED PROVIDER NOTE - CLINICAL SUMMARY MEDICAL DECISION MAKING FREE TEXT BOX
A/P: Hx of CHF, Afib, congenital heart disease status post 2 valve replacements on continuous milrinone infusion presented to the emergency room with fever, difficulty breathing  -- Patient appears to be septic at this time, fever of 104, hypoxic to 92, tachycardic  -- Patient will receive 1 L bolus via EMS, gentle fluids to be given via route of IV antibiotics due to complicated cardiac history  -- Plan to obtain sepsis lab work, cultures, x-ray

## 2023-08-24 NOTE — PROVIDER CONTACT NOTE (EICU) - ASSESSMENT
83 year old male with extensive cardiac history to include PICC with milrinone infusion now presents with fever, hypotension and confusion.   1. Shock (distributive vs cardiogenic)  2. Acute renal failure   3. Altered mental status    4. Rapid atrial fibrillation 83 year old male with extensive cardiac history to include PICC with milrinone infusion now presents with fever, hypotension and confusion.   1. Shock (distributive vs cardiogenic)  2. Acute renal failure   3. Altered mental status        4. Rapid atrial fibrillation

## 2023-08-24 NOTE — ED ADULT NURSE REASSESSMENT NOTE - NS ED NURSE REASSESS COMMENT FT1
Pt skin assessed. B/L extremity edema presents, L>R. Left LE 4+ pitting edema, R LE 2+ pitting edema.

## 2023-08-24 NOTE — PROVIDER CONTACT NOTE (EICU) - BACKGROUND
Pt awake but confused, moving all extremities  Wife at bedside reports patient can answer basic questions   Protecting airway  No accessory muscles actively being used  Vitals: 89/62, 126 rapid afib, RR 19, 104F rectally, 95% on 5L nasal canula Pt awake but confused, moving all extremities  Wife at bedside reports patient can answer basic questions   Protecting airway  No accessory muscles actively being used      Vitals: 89/62, 126 rapid afib, RR 19, 104F rectally, 95% on 5L nasal canula

## 2023-08-24 NOTE — PROVIDER CONTACT NOTE (EICU) - RECOMMENDATIONS
- Shock may be mixed septic and cardiogenic component; hx of infected PICC, however current picc site without erythema or discharge. Follow up blood cultures, broad spectrum abx   - Hypotensive may be related to pre-renal azotemia vs cardiorenal syndrome; should avoid nephrotoxic agents, philip strict INS/OUTS, Current CrCl 35mL/min (IBW). May continue with gentle hydration.   - Hold entresto and carvedilol   - Given tachycardia; rapid afib likely driven by sepsis, would avoid levophed as it may worsen tachycardia and drop CI. Neosynephrine not ideal as it may increase afterload and worsen HF. Would utilize vasopressin 0.04u with milrinone.   -Rapid afib - Shock may be mixed septic and cardiogenic component; hx of infected PICC, however current picc site without erythema or discharge. Follow up blood cultures, broad spectrum abx   - Hypotensive may be related to pre-renal azotemia vs cardiorenal syndrome; should avoid nephrotoxic agents, philip strict INS/OUTS, Current CrCl 35mL/min (IBW). May continue with gentle hydration.   - Hold entresto and carvedilol   - Given tachycardia; rapid afib likely driven by sepsis, would avoid levophed as it may worsen tachycardia and drop CI. Neosynephrine not ideal as it may increase afterload and worsen HF. Would utilize vasopressin 0.04u with milrinone.   -Rapid afib may utilize digoxin IV or amiodarone.   - Hold IV lasix, spironolactone and Furosamide - Shock may be mixed septic and cardiogenic component; hx of infected PICC, however current picc site without erythema or discharge. Follow up blood cultures, broad spectrum abx   - Hypotensive may be related to pre-renal azotemia vs cardiorenal syndrome; should avoid nephrotoxic agents, philip strict INS/OUTS, Current CrCl 35mL/min (IBW). May continue with gentle hydration.   - Hold entresto and carvedilol   - Given tachycardia; rapid afib likely driven by sepsisj  Neosynephrine not ideal as it may increase afterload and worsen HF. Would utilize vasopressin 0.04u with milrinone.   -Rapid afib may utilize digoxin IV or amiodarone.   - Hold IV lasix, spironolactone and Furosamide

## 2023-08-24 NOTE — ED ADULT NURSE REASSESSMENT NOTE - NS ED NURSE REASSESS COMMENT FT1
14 Fr indwelling catheter placed. Balloon inflated with 10cc. Urine output 500 cc. Pt tolerated procedure well. Care ongoing.

## 2023-08-24 NOTE — ED ADULT NURSE REASSESSMENT NOTE - NS ED NURSE REASSESS COMMENT FT1
Pt being transferred to [facility] via [facility]. EICU utilized for huddle report with [facility].

## 2023-08-24 NOTE — ED PROVIDER NOTE - PHYSICAL EXAMINATION
Constitutional:  Ill-appearing  HEENT: Dry mucus membranes  Eyes: Clear bilaterally, pupils equal, round and reactive to light.  Cardiac: Tachycardia  Respiratory: Breath sounds clear and equal bilaterally.  GI: Abdomen soft, non-tender, no guarding.  MSK: Spine appears normal, range of motion is not limited, no muscle or joint tenderness  Neuro: Alert and oriented x 2  Skin: Skin normal color for race, warm, dry and intact. No evidence of rash.

## 2023-08-24 NOTE — ED PROVIDER NOTE - CRTICAL CARE TIME SPENT (MIN)
[de-identified] : Chacko's test negative [de-identified] : 60

## 2023-08-24 NOTE — ED ADULT TRIAGE NOTE - CHIEF COMPLAINT QUOTE
Pt BIBA accompanied by wife, pt c/o SOB and subjective fevers x2 days. PMH CHF and "extensive cardiac history" per wife. Pts wife reports pt also has RUE midline picc which pt receives milrinone daily from wife. Wife states pt is "less with it" than normal. Pt able to alert and oriented to place and time. EMS placed 18g IV to left forearm and gave pt 1 L NS prior to ED arrival. Pt direct to room w/ clinical upgrade and Dr Felix to bedside.

## 2023-08-24 NOTE — PROVIDER CONTACT NOTE (EICU) - SITUATION
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 The patient was admitted to the MICU d/t hypotension i/s/o hypovolemia d/t likely viral gastroenteritis and need for continuous milrinone infusion, which he has required via PICC for 1 year.   Pt now presents on 8/24 complaining of sob, fever and confusion. In ED pt noted to have ARF, hypoxic and hypotensive. Pt was cultured and given broad spectrum abx. Pt did received 1L of NS and currently has MAP 68.

## 2023-08-24 NOTE — ED ADULT NURSE NOTE - CAS EDN DISCHARGE ASSESSMENT
Alert and oriented to person, place and time EICU utilized for transport report with [facility]/Alert and oriented to person, place and time

## 2023-08-24 NOTE — ED ADULT NURSE NOTE - OBJECTIVE STATEMENT
Pt is an 83y male c/o SOB and fevers. As per pt wife, pt has had fever x2 days and has become less oriented than normal. Pt oriented to person, year, disoriented to place. Pt arrives with 18g to L forearm. Pt also with PICC line to [facility], as per wife pt receives milrinone through this access. PMH CHF. Sepsis activated. Cardiac and pulse oximetry monitoring maintained.

## 2023-08-24 NOTE — ED PROVIDER NOTE - OBJECTIVE STATEMENT
83-year-old male with a history of CHF, afib, congenital heart disease status post 2 valve replacements and currently on continuous milrinone infusion presented to the emergency room complaining of shortness of breath, fever, confusion.  Wife relates that he started becoming ill yesterday, profoundly weak, complaining of shortness of breath.  Denies cough.  Patient relates recent admission at [facility] for lower extremity edema which has significantly improved.

## 2023-08-24 NOTE — ED PROVIDER NOTE - PROGRESS NOTE DETAILS
E-ICU consulted.  Patient's MAP 68.  Will give 500cc bolus as per e-ICU recommendations. Pressor started, vasopressin.  Consulted ICU at [facility] as patient is a known patient of [facility].  Accepted for admission to [facility] CCU by Dr. Seymour

## 2023-08-25 LAB
CULTURE RESULTS: NO GROWTH — SIGNIFICANT CHANGE UP
GRAM STN FLD: SIGNIFICANT CHANGE UP
METHOD TYPE: SIGNIFICANT CHANGE UP
MRSA SPEC QL CULT: SIGNIFICANT CHANGE UP
SPECIMEN SOURCE: SIGNIFICANT CHANGE UP

## 2023-08-27 LAB
-  AMPICILLIN/SULBACTAM: SIGNIFICANT CHANGE UP
-  CEFAZOLIN: SIGNIFICANT CHANGE UP
-  CLINDAMYCIN: SIGNIFICANT CHANGE UP
-  DAPTOMYCIN: SIGNIFICANT CHANGE UP
-  ERYTHROMYCIN: SIGNIFICANT CHANGE UP
-  GENTAMICIN: SIGNIFICANT CHANGE UP
-  LINEZOLID: SIGNIFICANT CHANGE UP
-  OXACILLIN: SIGNIFICANT CHANGE UP
-  PENICILLIN: SIGNIFICANT CHANGE UP
-  RIFAMPIN: SIGNIFICANT CHANGE UP
-  TETRACYCLINE: SIGNIFICANT CHANGE UP
-  TRIMETHOPRIM/SULFAMETHOXAZOLE: SIGNIFICANT CHANGE UP
-  VANCOMYCIN: SIGNIFICANT CHANGE UP
CULTURE RESULTS: SIGNIFICANT CHANGE UP
METHOD TYPE: SIGNIFICANT CHANGE UP
ORGANISM # SPEC MICROSCOPIC CNT: SIGNIFICANT CHANGE UP
SPECIMEN SOURCE: SIGNIFICANT CHANGE UP

## 2024-06-04 NOTE — ED ADULT NURSE NOTE - NSFALLHARMRISKINTERV_ED_ALL_ED
Order for annual labs   Assistance OOB with selected safe patient handling equipment if applicable/Assistance with ambulation/Communicate risk of Fall with Harm to all staff, patient, and family/Provide visual cue: red socks, yellow wristband, yellow gown, etc/Reinforce activity limits and safety measures with patient and family/Bed in lowest position, wheels locked, appropriate side rails in place/Call bell, personal items and telephone in reach/Instruct patient to call for assistance before getting out of bed/chair/stretcher/Non-slip footwear applied when patient is off stretcher/[place] to call system/Physically safe environment - no spills, clutter or unnecessary equipment/Purposeful Proactive Rounding/Room/bathroom lighting operational, light cord in reach

## 2024-08-06 NOTE — PATIENT PROFILE ADULT - FALL HARM RISK - PATIENT NEEDS ASSISTANCE
Quality 226: Preventive Care And Screening: Tobacco Use: Screening And Cessation Intervention: Patient screened for tobacco use and is an ex/non-smoker Detail Level: Detailed Walking/Toileting

## 2024-11-15 NOTE — ED PROVIDER NOTE - CPE EDP PSYCH NORM
No changes today  Can check blood sugar with finger sticks a couple of times per week, try alternating morning readings with some evening readings  If you have any questions or concerns, call me at: [phone]    normal...

## 2025-05-19 NOTE — PATIENT PROFILE ADULT - NSPROSPHOSPCHAPLAINYN_GEN_A_NUR
Chronic issue, reviewed last DEXA reviewed, discussed repeating due to chronic prednisone use but will defer.    no
